# Patient Record
Sex: FEMALE | Race: WHITE | Employment: FULL TIME | URBAN - METROPOLITAN AREA
[De-identification: names, ages, dates, MRNs, and addresses within clinical notes are randomized per-mention and may not be internally consistent; named-entity substitution may affect disease eponyms.]

---

## 2014-05-15 DEVICE — 5.5MM CURVED ROD, TITANIUM ALLOY, 65MM LENGTH
Type: IMPLANTABLE DEVICE | Site: SPINE LUMBAR | Status: NON-FUNCTIONAL
Brand: CREO
Removed: 2021-02-24

## 2020-11-23 ENCOUNTER — TRANSCRIBE ORDER (OUTPATIENT)
Dept: SCHEDULING | Age: 65
End: 2020-11-23

## 2020-11-23 DIAGNOSIS — M51.26 HNP (HERNIATED NUCLEUS PULPOSUS), LUMBAR: Primary | ICD-10-CM

## 2020-11-23 DIAGNOSIS — M54.16 LUMBAR RADICULOPATHY: ICD-10-CM

## 2020-11-27 NOTE — PERIOP NOTES
Patient denied any COVID-19 symptoms or possible exposure that would require a pre-procedural COVID-19 test for the IR procedure scheduled on 12/3.

## 2020-12-03 ENCOUNTER — HOSPITAL ENCOUNTER (OUTPATIENT)
Dept: INTERVENTIONAL RADIOLOGY/VASCULAR | Age: 65
Discharge: HOME | End: 2020-12-03
Attending: PHYSICIAN ASSISTANT
Payer: COMMERCIAL

## 2020-12-03 VITALS
BODY MASS INDEX: 19.24 KG/M2 | SYSTOLIC BLOOD PRESSURE: 158 MMHG | HEIGHT: 60 IN | HEART RATE: 79 BPM | OXYGEN SATURATION: 100 % | DIASTOLIC BLOOD PRESSURE: 82 MMHG | RESPIRATION RATE: 20 BRPM | WEIGHT: 98 LBS

## 2020-12-03 DIAGNOSIS — M51.26 HNP (HERNIATED NUCLEUS PULPOSUS), LUMBAR: ICD-10-CM

## 2020-12-03 DIAGNOSIS — M54.16 LUMBAR RADICULOPATHY: ICD-10-CM

## 2020-12-03 PROCEDURE — 74011250636 HC RX REV CODE- 250/636: Performed by: STUDENT IN AN ORGANIZED HEALTH CARE EDUCATION/TRAINING PROGRAM

## 2020-12-03 PROCEDURE — 77030003666 HC NDL SPINAL BD -A

## 2020-12-03 PROCEDURE — 2709999900 HC NON-CHARGEABLE SUPPLY

## 2020-12-03 PROCEDURE — 74011000250 HC RX REV CODE- 250: Performed by: STUDENT IN AN ORGANIZED HEALTH CARE EDUCATION/TRAINING PROGRAM

## 2020-12-03 PROCEDURE — 74011000636 HC RX REV CODE- 636: Performed by: STUDENT IN AN ORGANIZED HEALTH CARE EDUCATION/TRAINING PROGRAM

## 2020-12-03 PROCEDURE — 64483 NJX AA&/STRD TFRM EPI L/S 1: CPT

## 2020-12-03 RX ORDER — LIDOCAINE HYDROCHLORIDE 20 MG/ML
20 INJECTION, SOLUTION INFILTRATION; PERINEURAL ONCE
Status: COMPLETED | OUTPATIENT
Start: 2020-12-03 | End: 2020-12-03

## 2020-12-03 RX ORDER — LIDOCAINE HYDROCHLORIDE 10 MG/ML
4 INJECTION, SOLUTION EPIDURAL; INFILTRATION; INTRACAUDAL; PERINEURAL ONCE
Status: COMPLETED | OUTPATIENT
Start: 2020-12-03 | End: 2020-12-03

## 2020-12-03 RX ORDER — DEXAMETHASONE SODIUM PHOSPHATE 10 MG/ML
14 INJECTION INTRAMUSCULAR; INTRAVENOUS ONCE
Status: COMPLETED | OUTPATIENT
Start: 2020-12-03 | End: 2020-12-03

## 2020-12-03 RX ADMIN — DEXAMETHASONE SODIUM PHOSPHATE 14 MG: 10 INJECTION, SOLUTION INTRAMUSCULAR; INTRAVENOUS at 14:25

## 2020-12-03 RX ADMIN — IOPAMIDOL 3 ML: 408 INJECTION, SOLUTION INTRATHECAL at 14:25

## 2020-12-03 RX ADMIN — LIDOCAINE HYDROCHLORIDE 200 MG: 20 INJECTION, SOLUTION INFILTRATION; PERINEURAL at 14:25

## 2020-12-03 RX ADMIN — LIDOCAINE HYDROCHLORIDE 4 ML: 10 INJECTION, SOLUTION EPIDURAL; INFILTRATION; INTRACAUDAL; PERINEURAL at 14:25

## 2020-12-03 NOTE — PROCEDURES
Interventional Radiology  Procedure Note        12/3/2020 2:57 PM    Patient: Flaca Izaguirre     Informed consent obtained    Diagnosis: Back pain with radiculopathy    Procedure(s): Bilateral L3-4 transforaminal VIVIANA    Specimens removed:  none    Complications: None    Primary Physician: Chaim Davison MD    Recomendations: N/A    Discharge Disposition: stable; discharge to home    Full dictated report to follow    Chaim Davison MD  Interventional Radiology  [facility]  2:57 PM, 12/3/2020

## 2020-12-03 NOTE — DISCHARGE INSTRUCTIONS
[address]  Special Procedures/Radiology Department      Steroidal Injection      Go home and rest.     No vigorous physical activity today. Be aware that numbness and/or tingling can occur up to 24 hours after the injection. No driving today. Resume your previous diet. Resume your previous medications. Depending on your job, you may return to work in 25 to 48 hours. It may take up to one week after the injection to see a change or an improvement in your symptoms. Be sure to follow up with your physician. Tell your physician if the injection helped with your symptoms or if the injection did nothing for your symptoms. For minor discomfort, you can take Tylenol, as directed on the label.       If you have any questions or concerns, please call [phone] and ask for the nurse on-call

## 2020-12-03 NOTE — H&P
Radiology History and Physical    Patient: Priyanka Grier 60 y.o. female       Chief Complaint: Back pain with left radiculopathy    History of Present Illness: 60year old woman with back pain, markedly worse in the past few weeks. S/p L4-S1 fusion in 2014.     History:    Past Medical History:   Diagnosis Date    Other ill-defined conditions(876.89)     MIGRAINES     Family History   Problem Relation Age of Onset    Cancer Mother         COLON [place]     Social History     Socioeconomic History    Marital status: SINGLE     Spouse name: Not on file    Number of children: Not on file    Years of education: Not on file    Highest education level: Not on file   Occupational History    Not on file   Social Needs    Financial resource strain: Not on file    Food insecurity     Worry: Not on file     Inability: Not on file    Transportation needs     Medical: Not on file     Non-medical: Not on file   Tobacco Use    Smoking status: Never Smoker    Smokeless tobacco: Never Used   Substance and Sexual Activity    Alcohol use: No    Drug use: No    Sexual activity: Not on file   Lifestyle    Physical activity     Days per week: Not on file     Minutes per session: Not on file    Stress: Not on file   Relationships    Social connections     Talks on phone: Not on file     Gets together: Not on file     Attends Latter-day service: Not on file     Active member of club or organization: Not on file     Attends meetings of clubs or organizations: Not on file     Relationship status: Not on file    Intimate partner violence     Fear of current or ex partner: Not on file     Emotionally abused: Not on file     Physically abused: Not on file     Forced sexual activity: Not on file   Other Topics Concern    Not on file   Social History Narrative    Not on file       Allergies: No Known Allergies    Current Medications:  Current Outpatient Medications   Medication Sig    oxyCODONE-acetaminophen (PERCOCET 10) 10325 mg per tablet Take 1 Tab by mouth every four (4) hours as needed for Pain.  topiramate (TOPAMAX) 200 mg tablet Take 400 mg by mouth daily.  SUMAtriptan (IMITREX) 50 mg tablet Take 50 mg by mouth as needed for Migraine. Current Facility-Administered Medications   Medication Dose Route Frequency    iopamidoL (ISOVUE-M 200) 41 % contrast solution 3 mL  3 mL IntraSPINal ONCE    lidocaine (XYLOCAINE) 20 mg/mL (2 %) injection 400 mg  20 mL SubCUTAneous ONCE    lidocaine (PF) (XYLOCAINE) 10 mg/mL (1 %) injection 4 mL  4 mL SubCUTAneous ONCE    dexamethasone (PF) (DECADRON) 10 mg/mL injection 14 mg  14 mg IntraVENous ONCE        Physical Exam:  There were no vitals taken for this visit. GENERAL: alert, cooperative, no distress, appears stated age,   LUNG: Nonlabored respiration on room air  HEART: regular rate and rhythm, R radial pulse 2/2  EXT: No edema BLEs  ABD: Nontender, nondistended    Alerts:      Laboratory:    No results for input(s): HGB, HCT, WBC, PLT, INR, BUN, CREA, K, CRCLT, HGBEXT, HCTEXT, PLTEXT, INREXT in the last 72 hours. No lab exists for component: PTT, PT      Plan of Care/Planned Procedure:  Risks, benefits, and alternatives reviewed with patient and she agrees to proceed with the procedure.      L3-4 bilateral transforaminal VIVIANA    David Olguin MD  Interventional Radiology  [facility]  2:02 PM, 12/3/2020

## 2020-12-03 NOTE — ROUTINE PROCESS
1315 - pt ambulated into department w/limp gait but steady - pt reports this started approx 3-4 weeks ago that involved 10/10 pain radiating from back down left leg including left foot - pt reports that she has had prior back surgeries and steroid injections before. 1500 - pt report \"just tired feeling\" moving all extremities w/o limitations and ambulating w/same gait as she walk in prior - pt report no pain due to numbing for procedure injection. Pt drinking orange juice because she stated she did not eat today and maybe ate a pancake earlier this morning. Pt declined crackers or food. 1505 - pt reports she felt better and requested discharge - reviewed discharge instructions w/pt which verbalized understanding and copy given to pt. Wheelchair used to take pt to entrance of [address] where friend was driving pt home.

## 2020-12-16 ENCOUNTER — TRANSCRIBE ORDER (OUTPATIENT)
Dept: SCHEDULING | Age: 65
End: 2020-12-16

## 2020-12-16 DIAGNOSIS — M51.26 HNP (HERNIATED NUCLEUS PULPOSUS), LUMBAR: ICD-10-CM

## 2020-12-16 DIAGNOSIS — M51.36 DDD (DEGENERATIVE DISC DISEASE), LUMBAR: ICD-10-CM

## 2020-12-16 DIAGNOSIS — Z98.1 S/P LUMBAR FUSION: ICD-10-CM

## 2020-12-16 DIAGNOSIS — M54.16 LUMBAR RADICULOPATHY: Primary | ICD-10-CM

## 2020-12-16 DIAGNOSIS — M48.062 LUMBAR STENOSIS WITH NEUROGENIC CLAUDICATION: ICD-10-CM

## 2021-02-08 ENCOUNTER — TRANSCRIBE ORDER (OUTPATIENT)
Dept: REGISTRATION | Age: 66
End: 2021-02-08

## 2021-02-08 DIAGNOSIS — Z01.812 PRE-PROCEDURE LAB EXAM: Primary | ICD-10-CM

## 2021-02-10 ENCOUNTER — HOSPITAL ENCOUNTER (OUTPATIENT)
Dept: PREADMISSION TESTING | Age: 66
Discharge: HOME | End: 2021-02-10
Payer: COMMERCIAL

## 2021-02-10 VITALS
HEIGHT: 60 IN | BODY MASS INDEX: 20.26 KG/M2 | RESPIRATION RATE: 16 BRPM | WEIGHT: 103.17 LBS | HEART RATE: 80 BPM | DIASTOLIC BLOOD PRESSURE: 83 MMHG | SYSTOLIC BLOOD PRESSURE: 124 MMHG | TEMPERATURE: 98 F

## 2021-02-10 LAB
ABO + RH BLD: NORMAL
APPEARANCE UR: CLEAR
ATRIAL RATE: 70 BPM
BACTERIA URNS QL MICRO: NEGATIVE /HPF
BILIRUB UR QL: NEGATIVE
BLOOD GROUP ANTIBODIES SERPL: NORMAL
CALCULATED P AXIS, ECG09: 69 DEGREES
CALCULATED R AXIS, ECG10: 76 DEGREES
CALCULATED T AXIS, ECG11: 45 DEGREES
COLOR UR: NORMAL
DIAGNOSIS, 93000: NORMAL
EPITH CASTS URNS QL MICRO: NORMAL /LPF
GLUCOSE UR STRIP.AUTO-MCNC: NEGATIVE MG/DL
HGB UR QL STRIP: NEGATIVE
HYALINE CASTS URNS QL MICRO: NORMAL /LPF (ref 0–5)
KETONES UR QL STRIP.AUTO: NEGATIVE MG/DL
LEUKOCYTE ESTERASE UR QL STRIP.AUTO: NEGATIVE
NITRITE UR QL STRIP.AUTO: NEGATIVE
P-R INTERVAL, ECG05: 138 MS
PH UR STRIP: 6 [PH] (ref 5–8)
PROT UR STRIP-MCNC: NEGATIVE MG/DL
Q-T INTERVAL, ECG07: 416 MS
QRS DURATION, ECG06: 90 MS
QTC CALCULATION (BEZET), ECG08: 449 MS
RBC #/AREA URNS HPF: NORMAL /HPF (ref 0–5)
SP GR UR REFRACTOMETRY: 1.01 (ref 1–1.03)
SPECIMEN EXP DATE BLD: NORMAL
UA: UC IF INDICATED,UAUC: NORMAL
UROBILINOGEN UR QL STRIP.AUTO: 0.2 EU/DL (ref 0.2–1)
VENTRICULAR RATE, ECG03: 70 BPM
WBC URNS QL MICRO: NORMAL /HPF (ref 0–4)

## 2021-02-10 PROCEDURE — 93005 ELECTROCARDIOGRAM TRACING: CPT

## 2021-02-10 PROCEDURE — 86901 BLOOD TYPING SEROLOGIC RH(D): CPT

## 2021-02-10 PROCEDURE — 36415 COLL VENOUS BLD VENIPUNCTURE: CPT

## 2021-02-10 PROCEDURE — 81001 URINALYSIS AUTO W/SCOPE: CPT

## 2021-02-10 RX ORDER — SUMATRIPTAN 100 MG/1
100 TABLET, FILM COATED ORAL
COMMUNITY
End: 2022-11-01

## 2021-02-10 RX ORDER — CYCLOBENZAPRINE HCL 10 MG
20 TABLET ORAL
COMMUNITY
End: 2021-12-20

## 2021-02-10 RX ORDER — FUROSEMIDE 20 MG/1
20 TABLET ORAL DAILY
COMMUNITY

## 2021-02-10 RX ORDER — TRAMADOL HYDROCHLORIDE 50 MG/1
50 TABLET ORAL
COMMUNITY
End: 2021-02-28

## 2021-02-10 NOTE — PERIOP NOTES
DO NOT EAT ANYTHING AFTER MIDNIGHT, except as instructed THE NIGHT BEFORE SURGERY. PT GIVEN OPPORTUNITY TO ASK ADDITIONAL QUESTIONS. PRE-OPERATIVE INSTRUCTIONS REVIEWED WITH PATIENT. PATIENT GIVEN 2-PACK OF CHG SOAP. INSTRUCTIONS (REVIEWED) ON USE OF CHG SOAP. PATIENT GIVEN SSI INFECTION FAQ SHEET. MRSA / MSSA TREATMENT INSTRUCTION SHEET GIVEN WITH AN EXPLANATION TO PATIENT THAT THEY WILL BE NOTIFIED IF TREATMENT INSTRUCTIONS NEED TO BE INITIATED. PATIENT WAS GIVEN THE OPPORTUNITY TO ASK QUESTIONS ON THE INFORMATION PROVIDED. [facility] APPOINTMENTS REVIEWED AND GIVEN TO PATIENT. COVID-19 INSTRUCTION SHEET ALSO REVIEWED AND GIVEN TO PATIENT.

## 2021-02-10 NOTE — PERIOP NOTES
PAT Nurse Practitioner   Pre-Operative Chart Review/Assessment:-ORTHOPEDIC/NEUROSURGICAL SPINE                Patient Name:  Robin Krueger                                                           Age:   60 y.o.    :  1955     Today's Date:  2021     Date of PAT:   2/10/2021      Date of Surgery:    2021      Procedure(s):  L3-L4 Laminectomy Revision, L3-S1 Fusion Revision     Surgeon:   Dr. Trina Goddard                       PLAN:       1)  PCP: Dr. Shayy Martinez        2)  Cardiac Clearance: Not requested. 3)  Diabetic Treatment Consult: Not indicated. A1c-5.3       4)  Sleep Apnea evaluation:  Not indicated. ROBERT Score 1.       5)  Treatment for MRSA/Staph Aureus: Neg       6)  Additional Concerns: Migraines              Vital Signs:         Vitals:    02/10/21 0927   BP: 124/83   Pulse: 80   Resp: 16   Temp: 98 °F (36.7 °C)   Weight: 46.8 kg (103 lb 2.8 oz)   Height: 5' (1.524 m)            ____________________________________________  PAST MEDICAL HISTORY  Past Medical History:   Diagnosis Date    Arthritis     Other ill-defined conditions(799.89)     MIGRAINES      ____________________________________________  PAST SURGICAL HISTORY  Past Surgical History:   Procedure Laterality Date    HX GI      COLONOSCOPY MULTIPLE.  HX GI      ENDOSCOPY    HX GYN       X 1    HX HYSTERECTOMY      HX ORTHOPAEDIC      Spinal fusion at L4-L5    HX OTHER SURGICAL      DIAGNOSTIC EXPLORATORY SURGERIES MULTIPLE.  HX WISDOM TEETH EXTRACTION       X 4    IR INJ FORAMIN EPID LUMB ANES/STER SNGL  12/3/2020    NEUROLOGICAL PROCEDURE UNLISTED      LUMBAR FUSION WITH RODS & SCREWS      ____________________________________________  HOME MEDICATIONS    Current Outpatient Medications   Medication Sig    cyclobenzaprine (FLEXERIL) 10 mg tablet Take 20 mg by mouth three (3) times daily as needed for Muscle Spasm(s).     traMADoL (ULTRAM) 50 mg tablet Take 50 mg by mouth every six (6) hours as needed for Pain.  furosemide (LASIX) 20 mg tablet Take 20 mg by mouth daily.  SUMAtriptan (IMITREX) 100 mg tablet Take 100 mg by mouth once as needed for Migraine.  erenumab-aooe (Aimovig Autoinjector, 2 Pack,) 70 mg/mL injection 140 mg by SubCUTAneous route every twenty-eight (28) days. LAST DOSE: 1/29/2021     No current facility-administered medications for this encounter.       ____________________________________________  ALLERGIES  No Known Allergies   ____________________________________________  SOCIAL HISTORY  Social History     Tobacco Use    Smoking status: Never Smoker    Smokeless tobacco: Never Used   Substance Use Topics    Alcohol use: No      ____________________________________________        Labs:     CBC, BMP, A1c on chart.     Hospital Outpatient Visit on 02/10/2021   Component Date Value Ref Range Status    Special Requests: 02/10/2021 NO SPECIAL REQUESTS    Final    Culture result: 02/10/2021 MRSA NOT PRESENT    Final            Crossmatch Expiration 02/10/2021 02/24/2021,2359   Final    ABO/Rh(D) 02/10/2021 O POSITIVE   Final    Antibody screen 02/10/2021 NEG   Final    Ventricular Rate 02/10/2021 70  BPM Final    Atrial Rate 02/10/2021 70  BPM Final    P-R Interval 02/10/2021 138  ms Final    QRS Duration 02/10/2021 90  ms Final    Q-T Interval 02/10/2021 416  ms Final    QTC Calculation (Bezet) 02/10/2021 449  ms Final    Calculated P Axis 02/10/2021 69  degrees Final    Calculated R Axis 02/10/2021 76  degrees Final    Calculated T Axis 02/10/2021 45  degrees Final    Diagnosis 02/10/2021    Final                    Value:Normal sinus rhythm  Nonspecific ST abnormality  Abnormal ECG  When compared with ECG of 10-ERNESTO-2015 17:42,  NV interval has increased  Confirmed by Roselynn Hua, MD, Nlisa Hilton ([id number]) on 2/10/2021 2:16:31 PM      Color 02/10/2021 YELLOW/STRAW    Final    Color Reference Range: Straw, Yellow or Dark Yellow    Appearance 02/10/2021 CLEAR  CLEAR Final    Specific gravity 02/10/2021 1.010  1.003 - 1.030   Final    pH (UA) 02/10/2021 6.0  5.0 - 8.0   Final    Protein 02/10/2021 Negative  NEG mg/dL Final    Glucose 02/10/2021 Negative  NEG mg/dL Final    Ketone 02/10/2021 Negative  NEG mg/dL Final    Bilirubin 02/10/2021 Negative  NEG   Final    Blood 02/10/2021 Negative  NEG   Final    Urobilinogen 02/10/2021 0.2  0.2 - 1.0 EU/dL Final    Nitrites 02/10/2021 Negative  NEG   Final    Leukocyte Esterase 02/10/2021 Negative  NEG   Final    UA:UC IF INDICATED 02/10/2021 CULTURE NOT INDICATED BY UA RESULT  CNI   Final    WBC 02/10/2021 0-4  0 - 4 /hpf Final    RBC 02/10/2021 0-5  0 - 5 /hpf Final    Epithelial cells 02/10/2021 FEW  FEW /lpf Final    Epithelial cell category consists of squamous cells and /or transitional urothelial cells. Renal tubular cells, if present, are separately identified as such.  Bacteria 02/10/2021 Negative  NEG /hpf Final    Hyaline cast 02/10/2021 0-2  0 - 5 /lpf Final        Skin:     Denies open wounds, cuts, sores, rashes or other areas of concern in PAT assessment.         Cheryl Vinson NP

## 2021-02-11 LAB
BACTERIA SPEC CULT: NORMAL
BACTERIA SPEC CULT: NORMAL
SERVICE CMNT-IMP: NORMAL

## 2021-02-11 NOTE — PERIOP NOTES
PAT EKG AND LAB RESULTS FAXED TO DR. PUCKETT'S OFFICE VIA CC.    PCP LAB RESULTS FAXED TO DR. PUCKETT'S OFFICE.    LINDA TARIQ NP HAS REVIEWED CHART.

## 2021-02-15 NOTE — H&P
Merrill Pimentel  Location: [place] [address] [address]  Patient #: [id number]  : 1955  Single / Language: [place] / Race: White  Female    History of Present Illness   The patient is a 60year old female who presents with a complaint of Low Back Pain. Note for \"Low Back Pain\": Mrs. Chung Sena comes in today for an evaluation for ongoing lower back pain and left leg symptoms.  Her history is significant for a prior L4-S1 lumbar fusion.  She has been struggling over the last 6 to 8 months with severe lower back pain with radiation to the left hip and knee.  Radiation occasionally radiates to the medial shin.  It limits her standing and walking.  She does get some relief with sitting.  She is tried the injections with only worsening of symptoms.  She denies any bowel bladder difficulties. Problem List/Past Medical   SPONDYLOLISTHESIS, ACQ. (738.4)    FOLLOW-UP AFTER SURGERY (V67.00)    SCIATICA (724.3  M54.30)    STENOSIS (724.02)    S/P lumbar fusion (V45.4  Z98.1)    Lumbar radiculopathy (724.4  M54.16)    Lumbar stenosis with neurogenic claudication (724.03  M48.062)    LOW BACK PAIN (724.2  M54.5)    REVIEW OF SYSTEMS: Systems were reviewed by the provider.    HNP (herniated nucleus pulposus), lumbar (722.10  M51.26)      Allergies   No Known Allergies   [10/16/2020]:  No Known Drug Allergies   [10/16/2020]:    Family History   Cancer    Colon Cancer    Depression    Breast cancer    Arthritis    Asthma    Osteoporosis    Migraine Headache    Diabetes Mellitus    Heart disease in female family member before age 72    Hypertension      Social History   Sun Exposure   : rarely  Illicit drug use   : none  Tobacco use   2014: Never smoker. Alcohol use   2014: Never drinks. Exercise   2014: exercise activities occasionally. Caffeine use   2014: Drinks tea and soft drinks 1-2 times a day.     Medication History  Diclofenac Sodium  (75MG Tablet DR, 1 (one) Oral two times daily, as needed, Taken starting 12/15/2020) Active. Econazole Nitrate  (1% Cream, External) Active. Topiramate  (200MG Tablet, Oral) Active. SUMAtriptan Succinate  (50MG Tablet, Oral) Active. Medications Reconciled     Past Surgical History   No pertinent past surgical history      Diagnostic Studies History   Lumbar Spine X-ray   Date: 1/15/2021, Results: Review of the lumbar x-rays today demonstrate that she has the previous L4-S1 fusion. She has had a progression of the degeneration at L3-4 with an degenerative scoliosis above her prior fusion. She has a lateral listhesis at L3-4 of approximately 6 mm now. She has asymmetrical collapse onto the left side due to the lateral listhesis and scoliosis. There are no fractures or lytic lesions. MRI, Spine   Date: 1/15/2021, Results: Review of the most recent MRI demonstrates she does have the previous L4-S1 fusion. She has lateral listhesis at L3-4 now and foraminal stenosis and from a retrolisthesis at L3-4 as well as a scoliosis. Other Problems   Osteoporosis    Depression    Migraine Headache    DDD (degenerative disc disease), lumbar (722.52  M51.36)    Treatment options were discussed with the patient in full.      Review of Systems   General Present- Fatigue. Not Present- Appetite Loss, Chills, Fever, Night Sweats, Weight Gain and Weight Loss. Cardiovascular Present- Leg Cramps With Exertion. Not Present- Bluish Discoloration Of Lips Or Nails, Chest Pain, Difficulty Breathing Lying Down, Difficulty Breathing On Exertion, Palpitations and Swelling of Extremities. Musculoskeletal Present- Back Pain. Not Present- Joint Pain, Joint Stiffness and Joint Swelling. Neurological Present- Headaches. Not Present- Fainting, Memory Loss, Numbness, Seizures, Tingling, Tremor, Unsteadiness and Weakness. Endocrine Present- Cold Intolerance. Not Present- Excessive Hunger, Excessive Thirst, Excessive Urination and Heat Intolerance.     Assessment & Plan REVIEW OF SYSTEMS: Systems were reviewed by the provider.(V49.9)    Current Plans  Pt Education - How to [address] using Patient Portal and 3rd Party Apps: discussed with patient and provided information. X-RAY EXAM OF LUMBAR SPINE, 2 OR 3 VIEWS (27842) (AP and Lateral views were taken today using Digital Radiography.)  Restarted Cyclobenzaprine HCl 5 MG Oral Tablet, 1 or 2 Tablet three times daily as needed for spasms, #30, 01/15/2021, No Refill. Started traMADol HCl 50 MG Oral Tablet, 1 (one) Tablet every 6 hours as needed for pain, #42, 01/15/2021, No Refill. S/P lumbar fusion (V45.4  Z98.1)    Lumbar stenosis with neurogenic claudication (724.03  M48.062)    Spondylolisthesis (Acquired) (738.4  M43.10)  Impression: She has developed degeneration above her prior fusion with now a degenerative scoliosis and a lateral listhesis of 6 mm and a retrolisthesis of approximately 4 mm. She has severe left-sided radicular symptoms from the foraminal and lateral recess stenosis. She has failed conservative treatment including injections. I am recommending a revision laminectomy with extension of her fusion to L3. The risks and benefits were discussed at length with the patient and the patient has elected to proceed. Indications for surgery include failed conservative treatment. Alternative treatments, risks and the perioperative course were discussed with the patient. All questions were answered. The risks and benefits of the procedure were explained. Benefits include definitive diagnosis, relief of pain, elimination of deformity and improved function. Risks of surgery including bleeding, infection, weakness, numbness, CSF leak, failure to improve symptoms, exacerbation of medical co-morbidities and even death were discussed with the patient.     Treatment options were discussed with the patient in full.(V65.49)    Current Plans  Presurgical planning was preformed with the patient today  Surgery to be scheduled  Procedure: Revision laminectomy L3-4 with revision fusion L3-S1. Signed by Dondra Schirmer, MD     Date of Surgery Update:  Ron Devi was seen and examined. History and physical has been reviewed. The patient has been examined.  There have been no significant clinical changes since the completion of the originally dated History and Physical.    Signed By: Dondra Schirmer, MD     February 24, 2021 7:19 AM

## 2021-02-20 ENCOUNTER — HOSPITAL ENCOUNTER (OUTPATIENT)
Dept: PREADMISSION TESTING | Age: 66
Discharge: HOME | End: 2021-02-20
Attending: ORTHOPAEDIC SURGERY
Payer: COMMERCIAL

## 2021-02-20 DIAGNOSIS — Z01.812 PRE-PROCEDURE LAB EXAM: ICD-10-CM

## 2021-02-20 PROCEDURE — U0003 INFECTIOUS AGENT DETECTION BY NUCLEIC ACID (DNA OR RNA); SEVERE ACUTE RESPIRATORY SYNDROME CORONAVIRUS 2 (SARS-COV-2) (CORONAVIRUS DISEASE [COVID-19]), AMPLIFIED PROBE TECHNIQUE, MAKING USE OF HIGH THROUGHPUT TECHNOLOGIES AS DESCRIBED BY CMS-2020-01-R: HCPCS

## 2021-02-22 LAB — SARS-COV-2, COV2NT: NOT DETECTED

## 2021-02-24 ENCOUNTER — ANESTHESIA EVENT (OUTPATIENT)
Dept: SURGERY | Age: 66
DRG: 455 | End: 2021-02-24
Payer: COMMERCIAL

## 2021-02-24 ENCOUNTER — ANESTHESIA (OUTPATIENT)
Dept: SURGERY | Age: 66
DRG: 455 | End: 2021-02-24
Payer: COMMERCIAL

## 2021-02-24 ENCOUNTER — HOSPITAL ENCOUNTER (INPATIENT)
Age: 66
LOS: 4 days | Discharge: HOME HEALTH | DRG: 455 | End: 2021-02-28
Attending: ORTHOPAEDIC SURGERY | Admitting: ORTHOPAEDIC SURGERY
Payer: COMMERCIAL

## 2021-02-24 ENCOUNTER — APPOINTMENT (OUTPATIENT)
Dept: GENERAL RADIOLOGY | Age: 66
DRG: 455 | End: 2021-02-24
Attending: ORTHOPAEDIC SURGERY
Payer: COMMERCIAL

## 2021-02-24 DIAGNOSIS — M48.062 SPINAL STENOSIS OF LUMBAR REGION WITH NEUROGENIC CLAUDICATION: Primary | ICD-10-CM

## 2021-02-24 PROBLEM — M48.061 SPINAL STENOSIS, LUMBAR: Status: ACTIVE | Noted: 2021-02-24

## 2021-02-24 LAB
GLUCOSE BLD STRIP.AUTO-MCNC: 80 MG/DL (ref 65–100)
SERVICE CMNT-IMP: NORMAL

## 2021-02-24 PROCEDURE — 82962 GLUCOSE BLOOD TEST: CPT

## 2021-02-24 PROCEDURE — 77030005513 HC CATH URETH FOL11 MDII -B: Performed by: ORTHOPAEDIC SURGERY

## 2021-02-24 PROCEDURE — 0QW004Z REVISION OF INTERNAL FIXATION DEVICE IN LUMBAR VERTEBRA, OPEN APPROACH: ICD-10-PCS | Performed by: ORTHOPAEDIC SURGERY

## 2021-02-24 PROCEDURE — 74011250636 HC RX REV CODE- 250/636: Performed by: PHYSICIAN ASSISTANT

## 2021-02-24 PROCEDURE — 76210000016 HC OR PH I REC 1 TO 1.5 HR: Performed by: ORTHOPAEDIC SURGERY

## 2021-02-24 PROCEDURE — 76060000036 HC ANESTHESIA 2.5 TO 3 HR: Performed by: ORTHOPAEDIC SURGERY

## 2021-02-24 PROCEDURE — 77030040361 HC SLV COMPR DVT MDII -B: Performed by: ORTHOPAEDIC SURGERY

## 2021-02-24 PROCEDURE — 0SG00K1 FUSION OF LUMBAR VERTEBRAL JOINT WITH NONAUTOLOGOUS TISSUE SUBSTITUTE, POSTERIOR APPROACH, POSTERIOR COLUMN, OPEN APPROACH: ICD-10-PCS | Performed by: ORTHOPAEDIC SURGERY

## 2021-02-24 PROCEDURE — 2709999900 HC NON-CHARGEABLE SUPPLY: Performed by: ORTHOPAEDIC SURGERY

## 2021-02-24 PROCEDURE — 74011000250 HC RX REV CODE- 250: Performed by: PHYSICIAN ASSISTANT

## 2021-02-24 PROCEDURE — 74011250637 HC RX REV CODE- 250/637: Performed by: ANESTHESIOLOGY

## 2021-02-24 PROCEDURE — 77030022302 HC GRFT BN SPN SC OSTEO -H1: Performed by: ORTHOPAEDIC SURGERY

## 2021-02-24 PROCEDURE — C1889 IMPLANT/INSERT DEVICE, NOC: HCPCS | Performed by: ORTHOPAEDIC SURGERY

## 2021-02-24 PROCEDURE — 77030037713 HC CLOSR DEV INCIS ZIP STRY -B: Performed by: ORTHOPAEDIC SURGERY

## 2021-02-24 PROCEDURE — 72100 X-RAY EXAM L-S SPINE 2/3 VWS: CPT

## 2021-02-24 PROCEDURE — 0SG00AJ FUSION OF LUMBAR VERTEBRAL JOINT WITH INTERBODY FUSION DEVICE, POSTERIOR APPROACH, ANTERIOR COLUMN, OPEN APPROACH: ICD-10-PCS | Performed by: ORTHOPAEDIC SURGERY

## 2021-02-24 PROCEDURE — 74011000250 HC RX REV CODE- 250: Performed by: NURSE ANESTHETIST, CERTIFIED REGISTERED

## 2021-02-24 PROCEDURE — 77030022270 HC GRFT BN MAGNIFUS OSTEO -C: Performed by: ORTHOPAEDIC SURGERY

## 2021-02-24 PROCEDURE — 77030038692 HC WND DEB SYS IRMX -B: Performed by: ORTHOPAEDIC SURGERY

## 2021-02-24 PROCEDURE — 74011250636 HC RX REV CODE- 250/636: Performed by: NURSE ANESTHETIST, CERTIFIED REGISTERED

## 2021-02-24 PROCEDURE — 77030012893

## 2021-02-24 PROCEDURE — 77030038552 HC DRN WND MDII -A: Performed by: ORTHOPAEDIC SURGERY

## 2021-02-24 PROCEDURE — 74011250636 HC RX REV CODE- 250/636: Performed by: ANESTHESIOLOGY

## 2021-02-24 PROCEDURE — 76010000172 HC OR TIME 2.5 TO 3 HR INTENSV-TIER 1: Performed by: ORTHOPAEDIC SURGERY

## 2021-02-24 PROCEDURE — C1713 ANCHOR/SCREW BN/BN,TIS/BN: HCPCS | Performed by: ORTHOPAEDIC SURGERY

## 2021-02-24 PROCEDURE — 77030014650 HC SEAL MTRX FLOSEL BAXT -C: Performed by: ORTHOPAEDIC SURGERY

## 2021-02-24 PROCEDURE — 77030014007 HC SPNG HEMSTAT J&J -B: Performed by: ORTHOPAEDIC SURGERY

## 2021-02-24 PROCEDURE — 74011250637 HC RX REV CODE- 250/637: Performed by: PHYSICIAN ASSISTANT

## 2021-02-24 PROCEDURE — 77030040922 HC BLNKT HYPOTHRM STRY -A

## 2021-02-24 PROCEDURE — 77030008684 HC TU ET CUF COVD -B: Performed by: ANESTHESIOLOGY

## 2021-02-24 PROCEDURE — 00NY0ZZ RELEASE LUMBAR SPINAL CORD, OPEN APPROACH: ICD-10-PCS | Performed by: ORTHOPAEDIC SURGERY

## 2021-02-24 PROCEDURE — 65270000029 HC RM PRIVATE

## 2021-02-24 PROCEDURE — 77030026438 HC STYL ET INTUB CARD -A: Performed by: ANESTHESIOLOGY

## 2021-02-24 PROCEDURE — 77030034475 HC MISC IMPL SPN: Performed by: ORTHOPAEDIC SURGERY

## 2021-02-24 PROCEDURE — 2709999900 HC NON-CHARGEABLE SUPPLY

## 2021-02-24 PROCEDURE — 77030038600 HC TU BPLR IRR DISP STRY -B: Performed by: ORTHOPAEDIC SURGERY

## 2021-02-24 PROCEDURE — 77030006807 HC BLD SAW RECIP KMET -B: Performed by: ORTHOPAEDIC SURGERY

## 2021-02-24 PROCEDURE — 77030004391 HC BUR FLUT MEDT -C: Performed by: ORTHOPAEDIC SURGERY

## 2021-02-24 PROCEDURE — 77030031139 HC SUT VCRL2 J&J -A: Performed by: ORTHOPAEDIC SURGERY

## 2021-02-24 PROCEDURE — 77030013079 HC BLNKT BAIR HGGR 3M -A: Performed by: ANESTHESIOLOGY

## 2021-02-24 PROCEDURE — 74011250636 HC RX REV CODE- 250/636: Performed by: ORTHOPAEDIC SURGERY

## 2021-02-24 PROCEDURE — 0SB20ZZ EXCISION OF LUMBAR VERTEBRAL DISC, OPEN APPROACH: ICD-10-PCS | Performed by: ORTHOPAEDIC SURGERY

## 2021-02-24 PROCEDURE — 74011000250 HC RX REV CODE- 250: Performed by: ORTHOPAEDIC SURGERY

## 2021-02-24 PROCEDURE — 77030029099 HC BN WAX SSPC -A: Performed by: ORTHOPAEDIC SURGERY

## 2021-02-24 PROCEDURE — 0QW104Z REVISION OF INTERNAL FIXATION DEVICE IN SACRUM, OPEN APPROACH: ICD-10-PCS | Performed by: ORTHOPAEDIC SURGERY

## 2021-02-24 DEVICE — 75MM ROD, PRE-LORDOSED
Type: IMPLANTABLE DEVICE | Site: BACK | Status: FUNCTIONAL
Brand: FIREBIRD

## 2021-02-24 DEVICE — DBM 7509211 MAGNIFUSE 1 X 10CM
Type: IMPLANTABLE DEVICE | Site: BACK | Status: FUNCTIONAL
Brand: MAGNIFUSE® BONE GRAFT

## 2021-02-24 DEVICE — 7.5MM X 40MM CORTICAL BONE SCREW
Type: IMPLANTABLE DEVICE | Site: BACK | Status: FUNCTIONAL
Brand: JANUS

## 2021-02-24 DEVICE — 6.5MM X 10MM WIDE X 24MM LORDOTIC ASSEMBLY
Type: IMPLANTABLE DEVICE | Site: BACK | Status: FUNCTIONAL
Brand: FORZA XP

## 2021-02-24 DEVICE — 6.5MM X 45MM CORTICAL BONE SCREW
Type: IMPLANTABLE DEVICE | Site: BACK | Status: FUNCTIONAL
Brand: JANUS

## 2021-02-24 DEVICE — SET SCREW
Type: IMPLANTABLE DEVICE | Site: BACK | Status: FUNCTIONAL
Brand: FIREBIRD NXG

## 2021-02-24 DEVICE — TOP LOADING BODY
Type: IMPLANTABLE DEVICE | Site: BACK | Status: FUNCTIONAL
Brand: FIREBIRD NXG

## 2021-02-24 DEVICE — DBM 7509007 MAGNIFUSE 7.5 X 25MM
Type: IMPLANTABLE DEVICE | Site: BACK | Status: FUNCTIONAL
Brand: MAGNIFUSE® BONE GRAFT

## 2021-02-24 RX ORDER — HYDROMORPHONE HYDROCHLORIDE 2 MG/ML
INJECTION, SOLUTION INTRAMUSCULAR; INTRAVENOUS; SUBCUTANEOUS AS NEEDED
Status: DISCONTINUED | OUTPATIENT
Start: 2021-02-24 | End: 2021-02-24 | Stop reason: HOSPADM

## 2021-02-24 RX ORDER — SODIUM CHLORIDE 9 MG/ML
500 INJECTION, SOLUTION INTRAVENOUS ONCE
Status: COMPLETED | OUTPATIENT
Start: 2021-02-24 | End: 2021-02-24

## 2021-02-24 RX ORDER — MIDAZOLAM HYDROCHLORIDE 1 MG/ML
INJECTION, SOLUTION INTRAMUSCULAR; INTRAVENOUS AS NEEDED
Status: DISCONTINUED | OUTPATIENT
Start: 2021-02-24 | End: 2021-02-24 | Stop reason: HOSPADM

## 2021-02-24 RX ORDER — MORPHINE SULFATE IN 0.9 % NACL 150MG/30ML
PATIENT CONTROLLED ANALGESIA SYRINGE INTRAVENOUS
Status: DISCONTINUED | OUTPATIENT
Start: 2021-02-24 | End: 2021-02-25

## 2021-02-24 RX ORDER — PROPOFOL 10 MG/ML
VIAL (ML) INTRAVENOUS
Status: DISCONTINUED | OUTPATIENT
Start: 2021-02-24 | End: 2021-02-24 | Stop reason: HOSPADM

## 2021-02-24 RX ORDER — VANCOMYCIN HYDROCHLORIDE 1 G/20ML
INJECTION, POWDER, LYOPHILIZED, FOR SOLUTION INTRAVENOUS AS NEEDED
Status: DISCONTINUED | OUTPATIENT
Start: 2021-02-24 | End: 2021-02-24 | Stop reason: HOSPADM

## 2021-02-24 RX ORDER — DEXMEDETOMIDINE HYDROCHLORIDE 100 UG/ML
INJECTION, SOLUTION INTRAVENOUS AS NEEDED
Status: DISCONTINUED | OUTPATIENT
Start: 2021-02-24 | End: 2021-02-24 | Stop reason: HOSPADM

## 2021-02-24 RX ORDER — LIDOCAINE HYDROCHLORIDE 10 MG/ML
0.1 INJECTION, SOLUTION EPIDURAL; INFILTRATION; INTRACAUDAL; PERINEURAL AS NEEDED
Status: DISCONTINUED | OUTPATIENT
Start: 2021-02-24 | End: 2021-02-24 | Stop reason: HOSPADM

## 2021-02-24 RX ORDER — ROCURONIUM BROMIDE 10 MG/ML
INJECTION, SOLUTION INTRAVENOUS AS NEEDED
Status: DISCONTINUED | OUTPATIENT
Start: 2021-02-24 | End: 2021-02-24 | Stop reason: HOSPADM

## 2021-02-24 RX ORDER — MIDAZOLAM HYDROCHLORIDE 1 MG/ML
1 INJECTION, SOLUTION INTRAMUSCULAR; INTRAVENOUS AS NEEDED
Status: DISCONTINUED | OUTPATIENT
Start: 2021-02-24 | End: 2021-02-24 | Stop reason: HOSPADM

## 2021-02-24 RX ORDER — FUROSEMIDE 20 MG/1
20 TABLET ORAL DAILY
Status: DISCONTINUED | OUTPATIENT
Start: 2021-02-25 | End: 2021-02-28 | Stop reason: HOSPADM

## 2021-02-24 RX ORDER — CEFAZOLIN SODIUM 1 G/3ML
INJECTION, POWDER, FOR SOLUTION INTRAMUSCULAR; INTRAVENOUS AS NEEDED
Status: DISCONTINUED | OUTPATIENT
Start: 2021-02-24 | End: 2021-02-24 | Stop reason: HOSPADM

## 2021-02-24 RX ORDER — OXYCODONE HYDROCHLORIDE 5 MG/1
5 TABLET ORAL
Status: DISCONTINUED | OUTPATIENT
Start: 2021-02-24 | End: 2021-02-28 | Stop reason: HOSPADM

## 2021-02-24 RX ORDER — SODIUM CHLORIDE 0.9 % (FLUSH) 0.9 %
5-40 SYRINGE (ML) INJECTION AS NEEDED
Status: DISCONTINUED | OUTPATIENT
Start: 2021-02-24 | End: 2021-02-24 | Stop reason: HOSPADM

## 2021-02-24 RX ORDER — SUMATRIPTAN 25 MG/1
100 TABLET, FILM COATED ORAL
Status: COMPLETED | OUTPATIENT
Start: 2021-02-24 | End: 2021-02-25

## 2021-02-24 RX ORDER — SODIUM CHLORIDE 0.9 % (FLUSH) 0.9 %
5-40 SYRINGE (ML) INJECTION EVERY 8 HOURS
Status: DISCONTINUED | OUTPATIENT
Start: 2021-02-24 | End: 2021-02-24 | Stop reason: HOSPADM

## 2021-02-24 RX ORDER — KETOROLAC TROMETHAMINE 30 MG/ML
15 INJECTION, SOLUTION INTRAMUSCULAR; INTRAVENOUS EVERY 6 HOURS
Status: COMPLETED | OUTPATIENT
Start: 2021-02-24 | End: 2021-02-25

## 2021-02-24 RX ORDER — LIDOCAINE HYDROCHLORIDE 20 MG/ML
INJECTION, SOLUTION EPIDURAL; INFILTRATION; INTRACAUDAL; PERINEURAL AS NEEDED
Status: DISCONTINUED | OUTPATIENT
Start: 2021-02-24 | End: 2021-02-24 | Stop reason: HOSPADM

## 2021-02-24 RX ORDER — EPHEDRINE SULFATE/0.9% NACL/PF 50 MG/5 ML
5 SYRINGE (ML) INTRAVENOUS AS NEEDED
Status: DISCONTINUED | OUTPATIENT
Start: 2021-02-24 | End: 2021-02-24 | Stop reason: HOSPADM

## 2021-02-24 RX ORDER — ONDANSETRON 2 MG/ML
4 INJECTION INTRAMUSCULAR; INTRAVENOUS
Status: ACTIVE | OUTPATIENT
Start: 2021-02-24 | End: 2021-02-25

## 2021-02-24 RX ORDER — OXYCODONE HYDROCHLORIDE 5 MG/1
10 TABLET ORAL
Status: DISCONTINUED | OUTPATIENT
Start: 2021-02-24 | End: 2021-02-28 | Stop reason: HOSPADM

## 2021-02-24 RX ORDER — FENTANYL CITRATE 50 UG/ML
25 INJECTION, SOLUTION INTRAMUSCULAR; INTRAVENOUS
Status: COMPLETED | OUTPATIENT
Start: 2021-02-24 | End: 2021-02-24

## 2021-02-24 RX ORDER — SODIUM CHLORIDE, SODIUM LACTATE, POTASSIUM CHLORIDE, CALCIUM CHLORIDE 600; 310; 30; 20 MG/100ML; MG/100ML; MG/100ML; MG/100ML
125 INJECTION, SOLUTION INTRAVENOUS CONTINUOUS
Status: DISCONTINUED | OUTPATIENT
Start: 2021-02-24 | End: 2021-02-24 | Stop reason: HOSPADM

## 2021-02-24 RX ORDER — SODIUM CHLORIDE 9 MG/ML
1000 INJECTION, SOLUTION INTRAVENOUS CONTINUOUS
Status: DISCONTINUED | OUTPATIENT
Start: 2021-02-24 | End: 2021-02-24 | Stop reason: HOSPADM

## 2021-02-24 RX ORDER — FACIAL-BODY WIPES
10 EACH TOPICAL DAILY PRN
Status: DISCONTINUED | OUTPATIENT
Start: 2021-02-26 | End: 2021-02-28 | Stop reason: HOSPADM

## 2021-02-24 RX ORDER — SUCCINYLCHOLINE CHLORIDE 20 MG/ML
INJECTION INTRAMUSCULAR; INTRAVENOUS AS NEEDED
Status: DISCONTINUED | OUTPATIENT
Start: 2021-02-24 | End: 2021-02-24 | Stop reason: HOSPADM

## 2021-02-24 RX ORDER — PROPOFOL 10 MG/ML
INJECTION, EMULSION INTRAVENOUS AS NEEDED
Status: DISCONTINUED | OUTPATIENT
Start: 2021-02-24 | End: 2021-02-24 | Stop reason: HOSPADM

## 2021-02-24 RX ORDER — SODIUM CHLORIDE 0.9 % (FLUSH) 0.9 %
5-40 SYRINGE (ML) INJECTION EVERY 8 HOURS
Status: DISCONTINUED | OUTPATIENT
Start: 2021-02-24 | End: 2021-02-28 | Stop reason: HOSPADM

## 2021-02-24 RX ORDER — ACETAMINOPHEN 325 MG/1
650 TABLET ORAL ONCE
Status: COMPLETED | OUTPATIENT
Start: 2021-02-24 | End: 2021-02-24

## 2021-02-24 RX ORDER — ONDANSETRON 2 MG/ML
4 INJECTION INTRAMUSCULAR; INTRAVENOUS AS NEEDED
Status: DISCONTINUED | OUTPATIENT
Start: 2021-02-24 | End: 2021-02-24 | Stop reason: HOSPADM

## 2021-02-24 RX ORDER — AMOXICILLIN 250 MG
1 CAPSULE ORAL 2 TIMES DAILY
Status: DISCONTINUED | OUTPATIENT
Start: 2021-02-24 | End: 2021-02-28 | Stop reason: HOSPADM

## 2021-02-24 RX ORDER — CYCLOBENZAPRINE HCL 10 MG
10 TABLET ORAL
Status: DISCONTINUED | OUTPATIENT
Start: 2021-02-24 | End: 2021-02-28 | Stop reason: HOSPADM

## 2021-02-24 RX ORDER — SODIUM CHLORIDE 0.9 % (FLUSH) 0.9 %
5-40 SYRINGE (ML) INJECTION AS NEEDED
Status: DISCONTINUED | OUTPATIENT
Start: 2021-02-24 | End: 2021-02-28 | Stop reason: HOSPADM

## 2021-02-24 RX ORDER — CYCLOBENZAPRINE HCL 10 MG
20 TABLET ORAL
Status: DISCONTINUED | OUTPATIENT
Start: 2021-02-24 | End: 2021-02-24 | Stop reason: SDUPTHER

## 2021-02-24 RX ORDER — FENTANYL CITRATE 50 UG/ML
INJECTION, SOLUTION INTRAMUSCULAR; INTRAVENOUS AS NEEDED
Status: DISCONTINUED | OUTPATIENT
Start: 2021-02-24 | End: 2021-02-24 | Stop reason: HOSPADM

## 2021-02-24 RX ORDER — ACETAMINOPHEN 500 MG
1000 TABLET ORAL EVERY 6 HOURS
Status: DISCONTINUED | OUTPATIENT
Start: 2021-02-24 | End: 2021-02-28 | Stop reason: HOSPADM

## 2021-02-24 RX ORDER — DIPHENHYDRAMINE HYDROCHLORIDE 50 MG/ML
12.5 INJECTION, SOLUTION INTRAMUSCULAR; INTRAVENOUS AS NEEDED
Status: DISCONTINUED | OUTPATIENT
Start: 2021-02-24 | End: 2021-02-24 | Stop reason: HOSPADM

## 2021-02-24 RX ORDER — OXYCODONE AND ACETAMINOPHEN 5; 325 MG/1; MG/1
1 TABLET ORAL AS NEEDED
Status: DISCONTINUED | OUTPATIENT
Start: 2021-02-24 | End: 2021-02-24 | Stop reason: HOSPADM

## 2021-02-24 RX ORDER — MORPHINE SULFATE 2 MG/ML
2 INJECTION, SOLUTION INTRAMUSCULAR; INTRAVENOUS
Status: ACTIVE | OUTPATIENT
Start: 2021-02-24 | End: 2021-02-25

## 2021-02-24 RX ORDER — MIDAZOLAM HYDROCHLORIDE 1 MG/ML
0.5 INJECTION, SOLUTION INTRAMUSCULAR; INTRAVENOUS
Status: DISCONTINUED | OUTPATIENT
Start: 2021-02-24 | End: 2021-02-24 | Stop reason: HOSPADM

## 2021-02-24 RX ORDER — SODIUM CHLORIDE 9 MG/ML
50 INJECTION, SOLUTION INTRAVENOUS CONTINUOUS
Status: DISCONTINUED | OUTPATIENT
Start: 2021-02-24 | End: 2021-02-24 | Stop reason: HOSPADM

## 2021-02-24 RX ORDER — DIPHENHYDRAMINE HYDROCHLORIDE 50 MG/ML
12.5 INJECTION, SOLUTION INTRAMUSCULAR; INTRAVENOUS
Status: ACTIVE | OUTPATIENT
Start: 2021-02-24 | End: 2021-02-25

## 2021-02-24 RX ORDER — NALOXONE HYDROCHLORIDE 0.4 MG/ML
0.4 INJECTION, SOLUTION INTRAMUSCULAR; INTRAVENOUS; SUBCUTANEOUS AS NEEDED
Status: DISCONTINUED | OUTPATIENT
Start: 2021-02-24 | End: 2021-02-28 | Stop reason: HOSPADM

## 2021-02-24 RX ORDER — FENTANYL CITRATE 50 UG/ML
50 INJECTION, SOLUTION INTRAMUSCULAR; INTRAVENOUS AS NEEDED
Status: DISCONTINUED | OUTPATIENT
Start: 2021-02-24 | End: 2021-02-24 | Stop reason: HOSPADM

## 2021-02-24 RX ORDER — ONDANSETRON 2 MG/ML
INJECTION INTRAMUSCULAR; INTRAVENOUS AS NEEDED
Status: DISCONTINUED | OUTPATIENT
Start: 2021-02-24 | End: 2021-02-24 | Stop reason: HOSPADM

## 2021-02-24 RX ORDER — HYDROMORPHONE HYDROCHLORIDE 1 MG/ML
0.2 INJECTION, SOLUTION INTRAMUSCULAR; INTRAVENOUS; SUBCUTANEOUS
Status: DISCONTINUED | OUTPATIENT
Start: 2021-02-24 | End: 2021-02-24 | Stop reason: HOSPADM

## 2021-02-24 RX ORDER — SODIUM CHLORIDE 9 MG/ML
125 INJECTION, SOLUTION INTRAVENOUS CONTINUOUS
Status: DISPENSED | OUTPATIENT
Start: 2021-02-24 | End: 2021-02-25

## 2021-02-24 RX ORDER — MORPHINE SULFATE 10 MG/ML
2 INJECTION, SOLUTION INTRAMUSCULAR; INTRAVENOUS
Status: DISCONTINUED | OUTPATIENT
Start: 2021-02-24 | End: 2021-02-24 | Stop reason: HOSPADM

## 2021-02-24 RX ORDER — POLYETHYLENE GLYCOL 3350 17 G/17G
17 POWDER, FOR SOLUTION ORAL DAILY
Status: DISCONTINUED | OUTPATIENT
Start: 2021-02-25 | End: 2021-02-28 | Stop reason: HOSPADM

## 2021-02-24 RX ORDER — PHENYLEPHRINE HCL IN 0.9% NACL 0.4MG/10ML
SYRINGE (ML) INTRAVENOUS AS NEEDED
Status: DISCONTINUED | OUTPATIENT
Start: 2021-02-24 | End: 2021-02-24 | Stop reason: HOSPADM

## 2021-02-24 RX ORDER — DEXAMETHASONE SODIUM PHOSPHATE 4 MG/ML
INJECTION, SOLUTION INTRA-ARTICULAR; INTRALESIONAL; INTRAMUSCULAR; INTRAVENOUS; SOFT TISSUE AS NEEDED
Status: DISCONTINUED | OUTPATIENT
Start: 2021-02-24 | End: 2021-02-24 | Stop reason: HOSPADM

## 2021-02-24 RX ADMIN — Medication 10 ML: at 19:42

## 2021-02-24 RX ADMIN — PROPOFOL 100 MG: 10 INJECTION, EMULSION INTRAVENOUS at 10:32

## 2021-02-24 RX ADMIN — SODIUM CHLORIDE 125 ML/HR: 9 INJECTION, SOLUTION INTRAVENOUS at 21:06

## 2021-02-24 RX ADMIN — HYDROMORPHONE HYDROCHLORIDE 1 MG: 2 INJECTION, SOLUTION INTRAMUSCULAR; INTRAVENOUS; SUBCUTANEOUS at 10:39

## 2021-02-24 RX ADMIN — DEXAMETHASONE SODIUM PHOSPHATE 4 MG: 4 INJECTION, SOLUTION INTRAMUSCULAR; INTRAVENOUS at 10:38

## 2021-02-24 RX ADMIN — FENTANYL CITRATE 25 MCG: 50 INJECTION, SOLUTION INTRAMUSCULAR; INTRAVENOUS at 13:55

## 2021-02-24 RX ADMIN — CEFAZOLIN 1170 MG: 330 INJECTION, POWDER, FOR SOLUTION INTRAMUSCULAR; INTRAVENOUS at 10:59

## 2021-02-24 RX ADMIN — SUCCINYLCHOLINE CHLORIDE 60 MG: 20 INJECTION, SOLUTION INTRAMUSCULAR; INTRAVENOUS at 10:32

## 2021-02-24 RX ADMIN — Medication: at 13:41

## 2021-02-24 RX ADMIN — FENTANYL CITRATE 25 MCG: 50 INJECTION, SOLUTION INTRAMUSCULAR; INTRAVENOUS at 14:30

## 2021-02-24 RX ADMIN — SODIUM CHLORIDE 125 ML/HR: 9 INJECTION, SOLUTION INTRAVENOUS at 14:00

## 2021-02-24 RX ADMIN — PROPOFOL 100 MCG/KG/MIN: 10 INJECTION, EMULSION INTRAVENOUS at 10:37

## 2021-02-24 RX ADMIN — MORPHINE SULFATE 2 MG: 10 INJECTION INTRAVENOUS at 14:35

## 2021-02-24 RX ADMIN — SODIUM CHLORIDE, POTASSIUM CHLORIDE, SODIUM LACTATE AND CALCIUM CHLORIDE: 600; 310; 30; 20 INJECTION, SOLUTION INTRAVENOUS at 10:15

## 2021-02-24 RX ADMIN — DEXMEDETOMIDINE HYDROCHLORIDE 10 MCG: 100 INJECTION, SOLUTION, CONCENTRATE INTRAVENOUS at 13:15

## 2021-02-24 RX ADMIN — MIDAZOLAM 2 MG: 1 INJECTION INTRAMUSCULAR; INTRAVENOUS at 10:25

## 2021-02-24 RX ADMIN — FENTANYL CITRATE 50 MCG: 50 INJECTION, SOLUTION INTRAMUSCULAR; INTRAVENOUS at 10:32

## 2021-02-24 RX ADMIN — MORPHINE SULFATE 2 MG: 10 INJECTION INTRAVENOUS at 15:30

## 2021-02-24 RX ADMIN — Medication 80 MCG: at 12:38

## 2021-02-24 RX ADMIN — MORPHINE SULFATE 2 MG: 10 INJECTION INTRAVENOUS at 14:30

## 2021-02-24 RX ADMIN — FENTANYL CITRATE 25 MCG: 50 INJECTION, SOLUTION INTRAMUSCULAR; INTRAVENOUS at 14:35

## 2021-02-24 RX ADMIN — FENTANYL CITRATE 25 MCG: 50 INJECTION, SOLUTION INTRAMUSCULAR; INTRAVENOUS at 13:20

## 2021-02-24 RX ADMIN — ACETAMINOPHEN 650 MG: 325 TABLET ORAL at 09:54

## 2021-02-24 RX ADMIN — Medication 80 MCG: at 12:08

## 2021-02-24 RX ADMIN — MORPHINE SULFATE 2 MG: 10 INJECTION INTRAVENOUS at 14:00

## 2021-02-24 RX ADMIN — MORPHINE SULFATE 2 MG: 10 INJECTION INTRAVENOUS at 13:55

## 2021-02-24 RX ADMIN — HYDROMORPHONE HYDROCHLORIDE 0.2 MG: 1 INJECTION, SOLUTION INTRAMUSCULAR; INTRAVENOUS; SUBCUTANEOUS at 15:45

## 2021-02-24 RX ADMIN — ONDANSETRON HYDROCHLORIDE 4 MG: 2 INJECTION, SOLUTION INTRAMUSCULAR; INTRAVENOUS at 10:38

## 2021-02-24 RX ADMIN — FENTANYL CITRATE 25 MCG: 50 INJECTION, SOLUTION INTRAMUSCULAR; INTRAVENOUS at 14:00

## 2021-02-24 RX ADMIN — KETOROLAC TROMETHAMINE 15 MG: 30 INJECTION, SOLUTION INTRAMUSCULAR at 19:26

## 2021-02-24 RX ADMIN — ROCURONIUM BROMIDE 10 MG: 10 SOLUTION INTRAVENOUS at 10:32

## 2021-02-24 RX ADMIN — LIDOCAINE HYDROCHLORIDE 40 MG: 20 INJECTION, SOLUTION EPIDURAL; INFILTRATION; INTRACAUDAL; PERINEURAL at 10:32

## 2021-02-24 RX ADMIN — Medication 80 MCG: at 11:58

## 2021-02-24 RX ADMIN — CEFAZOLIN SODIUM 2 G: 1 INJECTION, POWDER, FOR SOLUTION INTRAMUSCULAR; INTRAVENOUS at 19:32

## 2021-02-24 RX ADMIN — Medication 80 MCG: at 12:20

## 2021-02-24 RX ADMIN — Medication 80 MCG: at 12:52

## 2021-02-24 RX ADMIN — FENTANYL CITRATE 25 MCG: 50 INJECTION, SOLUTION INTRAMUSCULAR; INTRAVENOUS at 13:16

## 2021-02-24 RX ADMIN — DOCUSATE SODIUM 50 MG AND SENNOSIDES 8.6 MG 1 TABLET: 8.6; 5 TABLET, FILM COATED ORAL at 19:31

## 2021-02-24 RX ADMIN — ACETAMINOPHEN 1000 MG: 500 TABLET ORAL at 19:31

## 2021-02-24 RX ADMIN — SODIUM CHLORIDE 500 ML/HR: 9 INJECTION, SOLUTION INTRAVENOUS at 22:03

## 2021-02-24 NOTE — ANESTHESIA POSTPROCEDURE EVALUATION
Post-Anesthesia Evaluation and Assessment    Patient: Juanito King MRN: [id number]  SSN: [id number]    YOB: 1955  Age: 60 y.o. Sex: female      I have evaluated the patient and they are stable and ready for discharge from the PACU. Cardiovascular Function/Vital Signs  Visit Vitals  BP 98/64   Pulse 80   Temp (!) 35.8 °C (96.5 °F)   Resp 10   Ht 5' (1.524 m)   Wt 46.8 kg (103 lb 2.8 oz)   SpO2 100%   BMI 20.15 kg/m²       Patient is status post General anesthesia for Procedure(s):  REVISION LAMINECTOMY L3-4 WITH REVISION FUSION L3-S1. Nausea/Vomiting: None    Postoperative hydration reviewed and adequate. Pain:  Pain Scale 1: FLACC (02/24/21 1321)  Pain Intensity 1: 0 (02/24/21 1321)   Managed    Neurological Status: At baseline    Mental Status, Level of Consciousness: Alert and  oriented to person, place, and time    Pulmonary Status:   O2 Device: Nasal cannula (02/24/21 1321)   Adequate oxygenation and airway patent    Complications related to anesthesia: None    Post-anesthesia assessment completed. No concerns    Signed By: Kendrick Polk MD     February 24, 2021              Procedure(s):  REVISION LAMINECTOMY L3-4 WITH REVISION FUSION L3-S1.    general    <BSHSIANPOST>    INITIAL Post-op Vital signs:   Vitals Value Taken Time   BP 98/64 02/24/21 1335   Temp 35.8 °C (96.5 °F) 02/24/21 1321   Pulse 87 02/24/21 1338   Resp 13 02/24/21 1338   SpO2 100 % 02/24/21 1338   Vitals shown include unvalidated device data.

## 2021-02-24 NOTE — ANESTHESIA PREPROCEDURE EVALUATION
Anesthetic History   No history of anesthetic complications            Review of Systems / Medical History  Patient summary reviewed, nursing notes reviewed and pertinent labs reviewed    Pulmonary  Within defined limits        Undiagnosed apnea         Neuro/Psych   Within defined limits           Cardiovascular  Within defined limits                     GI/Hepatic/Renal  Within defined limits              Endo/Other        Arthritis     Other Findings              Physical Exam    Airway  Mallampati: I  TM Distance: > 6 cm  Neck ROM: normal range of motion   Mouth opening: Normal     Cardiovascular  Regular rate and rhythm,  S1 and S2 normal,  no murmur, click, rub, or gallop             Dental  No notable dental hx       Pulmonary  Breath sounds clear to auscultation               Abdominal  GI exam deferred       Other Findings            Anesthetic Plan    ASA: 2  Anesthesia type: general          Induction: Intravenous  Anesthetic plan and risks discussed with: Patient

## 2021-02-24 NOTE — PERIOP NOTES
Given to sterile back table:    Floseal 10 ml, ref [id number], Lot [id number], exp 12/28/2022    Surgifoam, ref [id number], [address], exp 9/14/2024

## 2021-02-24 NOTE — PROGRESS NOTES
TRANSFER - IN REPORT:    Verbal report received from Jyoti Juarez RN(name) on Jeaneen Rinne  being received from [facility]) for routine post - op      Report consisted of patients Situation, Background, Assessment and   Recommendations(SBAR). Information from the following report(s) SBAR, OR Summary and MAR was reviewed with the receiving nurse Jacoby Bell RN. Opportunity for questions and clarification was provided. Assessment completed upon patients arrival to unit and care assumed.

## 2021-02-24 NOTE — ROUTINE PROCESS
Patient: Ria Varma MRN: [id number]  SSN: [id number]   YOB: 1955  Age: 60 y.o. Sex: female     Patient is status post Procedure(s):  REVISION LAMINECTOMY L3-4 WITH REVISION FUSION L3-S1. Surgeon(s) and Role: Artis Crum MD - Primary    Local/Dose/Irrigation:  80 ml local given, see MAR                  Peripheral IV 02/24/21 Distal;Posterior;Right Forearm (Active)          Hemovac Left;Posterior; Lower Back (Active)   Site Assessment Clean, dry, & intact 02/24/21 1238   Dressing Status Clean, dry, & intact 02/24/21 1238   Drainage Description Serosanguinous 02/24/21 1238   Status Patent; Charged;Draining 02/24/21 1238      Airway - Endotracheal Tube 02/24/21 Oral (Active)                   Dressing/Packing:  Incision 02/24/21 Back Medial;Lower-Dressing/Treatment: ABD pad;Skin glue;Tape/Soft cloth adhesive tape (02/24/21 1254)    Splint/Cast:  ]    Other:  Mastisol, zip closure, abd pad and hypofix tape

## 2021-02-25 LAB
ANION GAP SERPL CALC-SCNC: 5 MMOL/L (ref 5–15)
BUN SERPL-MCNC: 19 MG/DL (ref 6–20)
BUN/CREAT SERPL: 23 (ref 12–20)
CALCIUM SERPL-MCNC: 8 MG/DL (ref 8.5–10.1)
CHLORIDE SERPL-SCNC: 112 MMOL/L (ref 97–108)
CO2 SERPL-SCNC: 23 MMOL/L (ref 21–32)
CREAT SERPL-MCNC: 0.84 MG/DL (ref 0.55–1.02)
GLUCOSE SERPL-MCNC: 83 MG/DL (ref 65–100)
HGB BLD-MCNC: 7.6 G/DL (ref 11.5–16)
POTASSIUM SERPL-SCNC: 4.1 MMOL/L (ref 3.5–5.1)
SODIUM SERPL-SCNC: 140 MMOL/L (ref 136–145)

## 2021-02-25 PROCEDURE — 74011000250 HC RX REV CODE- 250: Performed by: PHYSICIAN ASSISTANT

## 2021-02-25 PROCEDURE — 36415 COLL VENOUS BLD VENIPUNCTURE: CPT

## 2021-02-25 PROCEDURE — 97535 SELF CARE MNGMENT TRAINING: CPT

## 2021-02-25 PROCEDURE — 65270000029 HC RM PRIVATE

## 2021-02-25 PROCEDURE — 80048 BASIC METABOLIC PNL TOTAL CA: CPT

## 2021-02-25 PROCEDURE — 97165 OT EVAL LOW COMPLEX 30 MIN: CPT

## 2021-02-25 PROCEDURE — 74011250637 HC RX REV CODE- 250/637: Performed by: PHYSICIAN ASSISTANT

## 2021-02-25 PROCEDURE — 85018 HEMOGLOBIN: CPT

## 2021-02-25 PROCEDURE — 74011250636 HC RX REV CODE- 250/636: Performed by: PHYSICIAN ASSISTANT

## 2021-02-25 RX ADMIN — DOCUSATE SODIUM 50 MG AND SENNOSIDES 8.6 MG 1 TABLET: 8.6; 5 TABLET, FILM COATED ORAL at 17:02

## 2021-02-25 RX ADMIN — ACETAMINOPHEN 1000 MG: 500 TABLET ORAL at 00:31

## 2021-02-25 RX ADMIN — Medication 10 ML: at 00:33

## 2021-02-25 RX ADMIN — OXYCODONE 10 MG: 5 TABLET ORAL at 10:50

## 2021-02-25 RX ADMIN — ACETAMINOPHEN 500 MG: 500 TABLET ORAL at 23:19

## 2021-02-25 RX ADMIN — Medication 10 ML: at 22:00

## 2021-02-25 RX ADMIN — OXYCODONE 5 MG: 5 TABLET ORAL at 23:19

## 2021-02-25 RX ADMIN — DOCUSATE SODIUM 50 MG AND SENNOSIDES 8.6 MG 1 TABLET: 8.6; 5 TABLET, FILM COATED ORAL at 09:31

## 2021-02-25 RX ADMIN — Medication 10 ML: at 15:29

## 2021-02-25 RX ADMIN — ACETAMINOPHEN 1000 MG: 500 TABLET ORAL at 17:02

## 2021-02-25 RX ADMIN — SUMATRIPTAN SUCCINATE 100 MG: 25 TABLET ORAL at 09:31

## 2021-02-25 RX ADMIN — OXYCODONE 10 MG: 5 TABLET ORAL at 19:18

## 2021-02-25 RX ADMIN — POLYETHYLENE GLYCOL 3350 17 G: 17 POWDER, FOR SOLUTION ORAL at 09:31

## 2021-02-25 RX ADMIN — KETOROLAC TROMETHAMINE 15 MG: 30 INJECTION, SOLUTION INTRAMUSCULAR at 12:00

## 2021-02-25 RX ADMIN — OXYCODONE 10 MG: 5 TABLET ORAL at 07:50

## 2021-02-25 RX ADMIN — Medication 10 ML: at 06:49

## 2021-02-25 RX ADMIN — CEFAZOLIN SODIUM 2 G: 1 INJECTION, POWDER, FOR SOLUTION INTRAMUSCULAR; INTRAVENOUS at 03:24

## 2021-02-25 RX ADMIN — SODIUM CHLORIDE 125 ML/HR: 9 INJECTION, SOLUTION INTRAVENOUS at 01:52

## 2021-02-25 RX ADMIN — KETOROLAC TROMETHAMINE 15 MG: 30 INJECTION, SOLUTION INTRAMUSCULAR at 06:47

## 2021-02-25 RX ADMIN — OXYCODONE 10 MG: 5 TABLET ORAL at 15:28

## 2021-02-25 RX ADMIN — KETOROLAC TROMETHAMINE 15 MG: 30 INJECTION, SOLUTION INTRAMUSCULAR at 00:27

## 2021-02-25 RX ADMIN — ACETAMINOPHEN 1000 MG: 500 TABLET ORAL at 13:31

## 2021-02-25 NOTE — PROGRESS NOTES
Orthopedic Spine Progress Note  Post Op day: 1 Day Post-Op    2021 8:55 AM   Admit Date: 2021  Procedure: Procedure(s):  REVISION LAMINECTOMY L3-4 WITH REVISION FUSION L3-S1    Subjective:     Nilton Tatum has no complaints. Expected back pain. No leg pain. Tolerating diet. No N/V. Pain Control:   Pain Assessment  Pain Scale 1: Numeric (0 - 10)  Pain Intensity 1: 7  Pain Onset 1: s/p post op surgical pain  Pain Location 1: Back  Pain Orientation 1: Posterior  Pain Description 1: Aching  Pain Intervention(s) 1: Medication (see MAR)    Objective:          Physical Exam:  General:  Alert and oriented. No acute distress. Heart:  Respirations unlabored. Abdomen:   Extremities: Soft, non-tender. No evidence of cyanosis. Pulses palpable in both upper and lower extremities. Neurologic:  Musculoskeletal:  No new motor deficits. Neurovascular exam within normal limits. Sensation stable. Motor: unchanged C5-T1 and L2-S1. Josefa's sign negative in bilateral lower extremities. Calves soft, nontender upon palpation and with passive twitch. Moves both upper and lower extremities. Incision: clean, dry, and intact. No significant erythema or swelling. No active drainage noted. Vital Signs:    Blood pressure 94/60, pulse 72, temperature 97.9 °F (36.6 °C), resp. rate 18, height 5' (1.524 m), weight 46.8 kg (103 lb 2.8 oz), SpO2 100 %.   Temp (24hrs), Av.7 °F (36.5 °C), Min:96.5 °F (35.8 °C), Max:98.6 °F (37 °C)      LAB:    Recent Labs     21  0408   HGB 7.6*     Lab Results   Component Value Date/Time    Sodium 140 2021 04:08 AM    Potassium 4.1 2021 04:08 AM    Chloride 112 (H) 2021 04:08 AM    CO2 23 2021 04:08 AM    Glucose 83 2021 04:08 AM    BUN 19 2021 04:08 AM    Creatinine 0.84 2021 04:08 AM    Calcium 8.0 (L) 2021 04:08 AM       Intake/Output:701 - 1900  In: -   Out: 190 [Drains:190]  1901 - 02/25 0700  In: 2114 [P.O.:360; I.V.:1754]  Out: 760 [Urine:550; Drains:190]    PT/OT:   Gait:                    Assessment:   Patient is 1 Day Post-Op s/p Procedure(s):  REVISION LAMINECTOMY L3-4 WITH REVISION FUSION L3-S1    Plan:     1. Continue PT/OT  2. Continue established methods of pain control  3. VTE Prophylaxes - TEDS &/or SCDs   4. Monitor Hgb - asymptomatic  5. Advance diet as tolerated  6. Home health consult.        Signed By: Patricia Wallis MD

## 2021-02-25 NOTE — PROGRESS NOTES
Problem: Self Care Deficits Care Plan (Adult)  Goal: *Acute Goals and Plan of Care (Insert Text)  Description: FUNCTIONAL STATUS PRIOR TO ADMISSION: Patient was independent and active without use of DME.    HOME SUPPORT: The patient lived alone with a friend to provide assistance. Occupational Therapy Goals  Initiated 2/25/2021    1. Patient will perform lower body dressing with modified independence using Reacher, Stocking Aid, and Long [employer] PRN within 7 days. 2.  Patient will perform bathing with supervision/set-up using most appropriate DME within 7 days. 3.  Patient will toileting at modified independence within 7 days. 4.  Patient will don/doff back brace at modified independence within 7 days. 5.  Patient will verbalize/demonstrate 3/3 back precautions during ADL tasks without cues within 7 days. Outcome: Progressing Towards Goal   OCCUPATIONAL THERAPY EVALUATION  Patient: Abhilash Cowan (64 y.o. female)  Date: 2/25/2021  Primary Diagnosis: Spinal stenosis, lumbar [M48.061]  Procedure(s) (LRB):  REVISION LAMINECTOMY L3-4 WITH REVISION FUSION L3-S1 (N/A) 1 Day Post-Op   Precautions: Back       ASSESSMENT  Based on the objective data described below, the patient presents with spinal precautions, decrease mobility, pain, decrease balance, generalized weakness and limited independence with self-care s/p recent revision laminectomy/fusion L3-4 POD #1. Pt received supine in bed, min assist with bed mobility and SBA to don brace, ambulates with RW to access bathroom with min assist. Reviewed back precautions, sitting limit of 30-45 minutes, positioning in chair, and progress. Demonstrated and verbalized understanding. Pt is NOT cleared for discharge from [place] [address] standpoint at this time. Will benefit from therapy in acute setting, anticipate will improve tolerance quickly with improved pain control.        Current Level of Function Impacting Discharge (ADLs/self-care): Functional Outcome Measure: The patient scored 55/100 on the Barthel outcome measure which is indicative of impairment. Other factors to consider for discharge: supportive friend coming to live with her for a few weeks     Patient will benefit from skilled therapy intervention to address the above noted impairments. PLAN :  Recommendations and Planned Interventions: self care training, functional mobility training, balance training, therapeutic activities, endurance activities, patient education, and home safety training    Frequency/Duration: Patient will be followed by occupational therapy 5 times a week to address goals. Recommendation for discharge: (in order for the patient to meet his/her long term goals)  No skilled occupational therapy/ follow up rehabilitation needs identified at this time. This discharge recommendation:  A follow-up discussion with the attending provider and/or case management is planned    IF patient discharges home will need the following DME: none       SUBJECTIVE:   Patient stated I work about 15 hour days.     OBJECTIVE DATA SUMMARY:   HISTORY:   Past Medical History:   Diagnosis Date    Arthritis     Other ill-defined conditions(309.89)     MIGRAINES     Past Surgical History:   Procedure Laterality Date    HX GI      COLONOSCOPY MULTIPLE. HX GI      ENDOSCOPY    HX GYN       X 1    HX HYSTERECTOMY      HX ORTHOPAEDIC      Spinal fusion at L4-L5    HX OTHER SURGICAL      DIAGNOSTIC EXPLORATORY SURGERIES MULTIPLE.     HX WISDOM TEETH EXTRACTION       X 4    IR INJ FORAMIN EPID LUMB ANES/STER SNGL  12/3/2020    NEUROLOGICAL PROCEDURE UNLISTED      LUMBAR FUSION WITH RODS & SCREWS       Expanded or extensive additional review of patient history:     Home Situation  Home Environment: Apartment  # Steps to Enter: 0  One/Two Story Residence: One story  Living Alone: Yes  Support Systems: Friends \ neighbors  Patient Expects to be Discharged to[de-identified] Apartment  Current DME Used/Available at Home: Shower chair, Walker, rolling  Tub or Shower Type: Shower        EXAMINATION OF PERFORMANCE DEFICITS:  Cognitive/Behavioral Status:  Neurologic State: Alert  Orientation Level: Oriented X4  Cognition: Appropriate decision making        Safety/Judgement: Awareness of environment    Skin: intact    Edema: none    Hearing:       Vision/Perceptual:                                     Range of Motion:    AROM: Within functional limits                         Strength:    Strength: Generally decreased, functional                Coordination:  Coordination: Within functional limits  Fine Motor Skills-Upper: Left Intact; Right Intact    Gross Motor Skills-Upper: Left Intact; Right Intact    Tone & Sensation:    Tone: Normal  Sensation: Intact                      Balance:  Sitting: Intact; With support  Standing: Intact; With support    Functional Mobility and Transfers for ADLs:  Bed Mobility:  Supine to Sit: Minimum assistance  Scooting: Minimum assistance    Transfers:  Sit to Stand: Minimum assistance  Stand to Sit: Minimum assistance  Bed to Chair: Minimum assistance  Bathroom Mobility: Minimum assistance  Toilet Transfer : Minimum assistance    ADL Assessment:  Feeding: Independent    Oral Facial Hygiene/Grooming: Independent    Bathing: Minimum assistance; Moderate assistance    Upper Body Dressing: Minimum assistance    Lower Body Dressing: Moderate assistance;Maximum assistance    Toileting: Minimum assistance                ADL Intervention and task modifications:            Cognitive Retraining  Safety/Judgement: Awareness of environment    Therapeutic Exercise:     Functional Measure:  Barthel Index:    Bathin  Bladder: 0  Bowels: 10  Groomin  Dressin  Feeding: 10  Mobility: 10  Stairs: 0  Toilet Use: 5  Transfer (Bed to Chair and Back): 10  Total: 55/100        The Barthel ADL Index: Guidelines  1.  The index should be used as a record of what a patient does, not as a record of what a patient could do. 2. The main aim is to establish degree of independence from any help, physical or verbal, however minor and for whatever reason. 3. The need for supervision renders the patient not independent. 4. A patient's performance should be established using the best available evidence. Asking the patient, friends/relatives and nurses are the usual sources, but direct observation and common sense are also important. However direct testing is not needed. 5. Usually the patient's performance over the preceding 24-48 hours is important, but occasionally longer periods will be relevant. 6. Middle categories imply that the patient supplies over 50 per cent of the effort. 7. Use of aids to be independent is allowed. Fede Erickson., Barthel, D.W. (3191). Functional evaluation: the Barthel Index. [facility] (14)2. Valeria Rocha kyle ADRI Santos, Juana Helton., Severa Mouse., [place], [facility] (1999). Measuring the change indisability after inpatient rehabilitation; comparison of the responsiveness of the Barthel Index and Functional Assumption Measure. Journal of Neurology, Neurosurgery, and Psychiatry, 66(4), 971-775. Rebekah Sam, N.J.A, Chris Barnes,  MARCOS.J.M, & Helga Banegas, M.A. (2004.) Assessment of post-stroke quality of life in cost-effectiveness studies: The usefulness of the Barthel Index and the EuroQoL-5D.  Quality of Life Research, 15, 098-11        Occupational Therapy Evaluation Charge Determination   History Examination Decision-Making   LOW Complexity : Brief history review  LOW Complexity : 1-3 performance deficits relating to physical, cognitive , or psychosocial skils that result in activity limitations and / or participation restrictions  LOW Complexity : No comorbidities that affect functional and no verbal or physical assistance needed to complete eval tasks       Based on the above components, the patient evaluation is determined to be of the following complexity level: LOW   Pain Ratin/10p!, nursing notified    Activity Tolerance:   Good    After treatment patient left in no apparent distress:    Sitting in chair and Call bell within reach    COMMUNICATION/EDUCATION:   The patients plan of care was discussed with: Physical therapist and Registered nurse. Home safety education was provided and the patient/caregiver indicated understanding., Patient/family have participated as able in goal setting and plan of care. , and Patient/family agree to work toward stated goals and plan of care. This patients plan of care is appropriate for delegation to [facility].     Thank you for this referral.  Tres Cazares OTR/L  Time Calculation: 41 mins

## 2021-02-25 NOTE — PROGRESS NOTES
Pt currently resting and OT reports pt had headache. Pt does not respond to verbal stimuli, defer until patient awakens. Checked again  At noon, currently still sleeping and does not rouse.     Ladi Teran, DPT, PT

## 2021-02-25 NOTE — PROGRESS NOTES
Nikki VILLARREAL, notified and aware that patient's blood pressure systolic is 37'G - low 446'W and diastolic 02'A-57'X even while she was in PACU. Layton catheter urine output is 150 ml since 17:00. Hemovac output this evening is 190. Patient is also 100 pounds. RN inquiring if a bolus of fluid should be given. [place] VILLARREAL ordered,\"Normal Saline 500 ml bolus x 1 dose. \" RN will implement PA's orders.

## 2021-02-25 NOTE — OP NOTES
[address]   OPERATIVE REPORT    Name:  Merlin Potters  MR#:  [id number]  :  1955  ACCOUNT #:  [de-identified]  DATE OF SERVICE:  2021    PREOPERATIVE DIAGNOSES:  Status post L4-S1 fusion; lumbar degenerative scoliosis; lumbar stenosis, L3-4. POSTOPERATIVE DIAGNOSES:  Status post L4-S1 fusion; lumbar degenerative scoliosis; lumbar stenosis, L3-4. PROCEDURES PERFORMED:  Exploration of fusion; revision laminectomy, L2-3, L3-4 and; revision fusion, L4 to S1 with posterior segmental instrumentation; posterior transforaminal interbody fusion, L3-4. SURGEON:  Jumana Mendez MD    ASSISTANT:  Ketty Birch PA-C    ANESTHESIA:  General.    COMPLICATIONS:  None. SPECIMENS REMOVED:  None. IMPLANTS:  Instrumentation includes Orthofix Janus screws and Orthofix FXP. ESTIMATED BLOOD LOSS:  100 mL. DRAINS:  One mini Hemovac. INDICATIONS:  This is a pleasant 80-year-old female who has had had multiple lumbar procedures, the last procedure was in L4 to S1 revision fusion, now has developed junctional stenosis at L3-4. She has failed conservative treatment with injection therapy with ongoing radicular symptoms, left greater than right. She is for extension of her fusion. PROCEDURE:  The patient was identified, brought to the operative suite and underwent general anesthesia without difficulty. Layton catheter was placed. Preoperative neuromonitoring was placed. Baselines were obtained. These remained stable throughout the surgical procedure. The patient was placed prone on the open [facility] table with all bony prominences well-padded. Back was prepped and draped sterilely. After prepping and draping, time-out was performed. A midline incision was made with dissection to the thoracodorsal fascia. We carefully exposed the previous instrumentation hardware from L4 to S1.   We also exposed the L3-4 facet joint and left the capsule intact and then also exposed the transverse processes of L3, carefully dissecting this out laterally. At which time, deep retractors were placed. We then removed the previous hardware without difficult to explore the fusion mass, which was intact. We then cleaned soft tissue out of the fusion mass for subsequent augmentation of the fusion extension to L3. We then started wide revision laminectomy with removal of the same with the residual of the L3 lamina, carrying this out proximally with decompression of the central canal.  We exposed proximally undercutting the L2 lamina with release of the ligamentum there. Pedicles of L3 were identified and the exiting L3 nerve root was visualized and decompressed bilaterally, left greater than right. We then removed the previous instrumentation from L4 to S1, replaced it with Orthofix Janus screws to augment the fusion. We then cannulated the pedicles of L3 bilaterally using standard bony landmarks.  hole started with Midas gal with a 5.5 gal and then followed by straight gearshift. We then tested with neuromonitoring and palpated each hole for no medial breaches. The wound was thoroughly irrigated. We placed 6.5 x 45 mm Orthofix Janus screws at the L3 level. At that time, we then did an annulotomy on the left hand side and did a diskectomy with pituitary rongeurs, curved curette, irlanda with distraction of the disk space completely. Wounds were thoroughly irrigated. We then packed a small MagniFuse into the anterior one-third column, followed by a Lynder Ariana XP implant likely across the disk space. This is a 24 mm length implant 10.6 mm height. At that time, the wounds were irrigated again with pulse irrigation as well as Irrisept. We decorticated the facets and the L3 spinous process as well as the L3-4 facets in the previous fusion mass. Packed bone graft into the lateral gutters.   70 mm titanium rods were attached to the pedicle screws and set screws and final tightening was performed. Stable fixation obtained. Final x-rays demonstrated stable alignment. Wounds were thoroughly irrigated. Mini Hemovac placed. 0 Vicryl on the fascial layer, 2-0 Vicryl on the subcutaneous layer, and 3-0 Vicryl on the skin. The physician assistant was present during the entire operative procedure and assisted in all critical elements of the surgery. No surgical assist or resident was available.      MD ADAM Garcia/[email or web address]  D:  02/24/2021 13:05  T:  02/24/2021 22:55  JOB #:  [id number]

## 2021-02-25 NOTE — PROGRESS NOTES
Attempted to see patient, pt had just gotten back to bed from bathroom. Pt tolerates sit to supine but unable to participate in therapy evaluation d/t pain. Pt given hot pack for neck due to SCM pain and spasm. Will continue to follow.      Stephany Lane, DAYOT, PT

## 2021-02-25 NOTE — PROGRESS NOTES
Primary Nurse Donald Peter RN and Brian Rogers RN performed a dual skin assessment on this patient No impairment noted  Omari score is 20

## 2021-02-26 LAB — HGB BLD-MCNC: 9.1 G/DL (ref 11.5–16)

## 2021-02-26 PROCEDURE — 74011250637 HC RX REV CODE- 250/637: Performed by: PHYSICIAN ASSISTANT

## 2021-02-26 PROCEDURE — 97161 PT EVAL LOW COMPLEX 20 MIN: CPT

## 2021-02-26 PROCEDURE — 36415 COLL VENOUS BLD VENIPUNCTURE: CPT

## 2021-02-26 PROCEDURE — 65270000029 HC RM PRIVATE

## 2021-02-26 PROCEDURE — 85018 HEMOGLOBIN: CPT

## 2021-02-26 PROCEDURE — 97116 GAIT TRAINING THERAPY: CPT

## 2021-02-26 PROCEDURE — 97530 THERAPEUTIC ACTIVITIES: CPT

## 2021-02-26 RX ORDER — OXYCODONE HYDROCHLORIDE 5 MG/1
5-10 TABLET ORAL
Qty: 60 TAB | Refills: 0 | Status: SHIPPED | OUTPATIENT
Start: 2021-02-26 | End: 2021-03-05

## 2021-02-26 RX ORDER — NALOXONE HYDROCHLORIDE 4 MG/.1ML
SPRAY NASAL
Qty: 2 EACH | Refills: 0 | Status: SHIPPED | OUTPATIENT
Start: 2021-02-26

## 2021-02-26 RX ORDER — ONDANSETRON 4 MG/1
4 TABLET, ORALLY DISINTEGRATING ORAL
Status: DISCONTINUED | OUTPATIENT
Start: 2021-02-26 | End: 2021-02-28 | Stop reason: HOSPADM

## 2021-02-26 RX ADMIN — ONDANSETRON 4 MG: 4 TABLET, ORALLY DISINTEGRATING ORAL at 11:59

## 2021-02-26 RX ADMIN — CYCLOBENZAPRINE 10 MG: 10 TABLET, FILM COATED ORAL at 13:43

## 2021-02-26 RX ADMIN — DOCUSATE SODIUM 50 MG AND SENNOSIDES 8.6 MG 1 TABLET: 8.6; 5 TABLET, FILM COATED ORAL at 17:37

## 2021-02-26 RX ADMIN — POLYETHYLENE GLYCOL 3350 17 G: 17 POWDER, FOR SOLUTION ORAL at 08:53

## 2021-02-26 RX ADMIN — OXYCODONE 10 MG: 5 TABLET ORAL at 02:19

## 2021-02-26 RX ADMIN — OXYCODONE 10 MG: 5 TABLET ORAL at 17:37

## 2021-02-26 RX ADMIN — OXYCODONE 10 MG: 5 TABLET ORAL at 14:45

## 2021-02-26 RX ADMIN — OXYCODONE 10 MG: 5 TABLET ORAL at 05:23

## 2021-02-26 RX ADMIN — DOCUSATE SODIUM 50 MG AND SENNOSIDES 8.6 MG 1 TABLET: 8.6; 5 TABLET, FILM COATED ORAL at 08:53

## 2021-02-26 RX ADMIN — Medication 10 ML: at 06:00

## 2021-02-26 RX ADMIN — ACETAMINOPHEN 1000 MG: 500 TABLET ORAL at 05:23

## 2021-02-26 RX ADMIN — ACETAMINOPHEN 1000 MG: 500 TABLET ORAL at 17:37

## 2021-02-26 RX ADMIN — OXYCODONE 10 MG: 5 TABLET ORAL at 08:53

## 2021-02-26 RX ADMIN — Medication 10 ML: at 22:41

## 2021-02-26 RX ADMIN — OXYCODONE 10 MG: 5 TABLET ORAL at 21:02

## 2021-02-26 RX ADMIN — OXYCODONE 10 MG: 5 TABLET ORAL at 11:47

## 2021-02-26 NOTE — DISCHARGE INSTRUCTIONS
After Hospital Care Plan:  Discharge Instructions Lumbar Fusion Surgery   Dr. Camila Clifton   Patient Name: Michelle Berumen    Date of procedure: 2/24/2021  Date of discharge: 2/26/2021    Procedure: Procedure(s):  REVISION LAMINECTOMY L3-4 WITH REVISION FUSION L3-S1  PCP: Marcus Berrios MD    Follow up appointments  -follow up with Dr. Dr. Camila Clifton in 2 weeks. Call [phone] to make an appointment as soon as you get home from the hospital.    [address] [place], [place] [address]y: ____________________   phone: _______________________  The agency will contact you to arrange dates/times for visits. Please call them if you do not hear from them within 24 hours after you are discharged  Physical therapy 3 times a week for 3 weeks  Nursing-initial assessment and as needed    When to call your Orthopaedic Surgeon:  -Signs of infection-if your incision is red; continues to have drainage; drainage has a foul odor or if you have a persistent fever over 101 degrees for 24 hours  -Nausea or vomiting, severe headache  -Loss of bowel or bladder function, inability to urinate  -Changes in sensation in your arms or legs (numbness, tingling, loss of color)  -Increased weakness-greater than before your surgery  -Severe pain or pain not relieved by medications  -Signs of a blood clot in your leg-calf pain, tenderness, redness, swelling of lower leg    When to call your Primary Care Physician:  -Concerns about medical conditions such as diabetes, high blood pressure, asthma, congestive heart failure  -Call if blood sugars are elevated, persistent headache or dizziness, coughing or congestion, constipation or diarrhea, burning with urination, abnormal heart rate    When to call 911 and go to the nearest emergency room:  -Acute onset of chest pain, shortness of breath, difficulty breathing    Activity  -You are going home a well person, be as active as possible. Your only exercise should be walking.   Start with short frequent walks and increase your walking distance each day.  -Limit the amount of time you sit to 20-30 minute intervals. Sitting for prolonged periods of time will be uncomfortable for you following surgery.  -Do NOT lift anything over 5 pounds  -Do NOT do any straining, twisting or bending  -When you are in bed, you may lay on your back or on either side. Do NOT lie on your stomach    Brace  -If you have a back brace, you should wear your brace at all times when you are out of bed. Do not wear the brace while in bed or showering.  -Remember to always wear a cotton t-shirt underneath your brace.  -Do not bend or twist when your brace is off    Diet  -Resume usual diet; drink plenty of fluids; eat foods high in fiber  -It is important to have regular bowel movements. Pain medications may cause constipation. You may want to take a stool softener (such as Senokot-S or Colace) to prevent constipation.   -If constipation occurs, take a laxative (such as Dulcolax tablets, Milk of Magnesia, or a suppository). Laxatives should only be used if the above preventable measures have failed and you still have not had a bowel movement after three days    Driving  -You may not drive or return to work until instructed by your physician. However, you may ride in the car for short periods of time. Incision Care  Your incision has been closed with absorbable sutures and the Zipline skin closure system. This will assist with healing. The Divya Grater is to remain on your incision for 2 weeks. A dry dressing (ABD and tape) will be placed over it and should be changed daily, for at least the first several days after your surgery. If you have no incisional drainage, you may leave the incision open to air if you wish, still leaving the Zipline in place. Please make sure to wash your hands prior to touching your dressing. You may take brief showers but do not run the water directly onto the wound.  After your shower, blot your incision dry with a soft towel and replace the dry dressing. Do not allow the tape to come in contact with the Zipline. Do not rub or apply any lotions or ointments to your incision site. Do not soak or scrub your wound. The Zipline dressing will be removed during your two week follow-up appointment. If you experience drainage leaking from underneath the Zipline or if it peels off before 2 weeks, please contact your orthopedic surgeons office. Showering  -You may shower in approximately 4 days after your surgery.    -Leave the dressing on during your shower. Do NOT allow the water to run directly onto your dressing. Once you get out of the shower, gently pat the dressing dry. -Reminder- your brace can be removed while showering. Remember to not bend or twist while your brace is off.    -Do not take a tub bath. Preventing blood clots  -You have been given T.E.D. stockings to wear. Continue to wear these for 7 days after your discharge. Put them on in the morning and take them off at night.    -They are used to increase your circulation and prevent blood clots from forming in your legs  -T. E.D. stockings can be machine washed, temperature not to exceed 160° F (71°C) and machine dried for 15 to 20 minutes, temperature not to exceed 250° F (121°C). Pain management  -Take pain medication as prescribed; decrease the amount you use as your pain lessens  -DO not wait until you are in extreme pain to take your medication.  -Avoid alcoholic beverages while taking pain medication    Pain Medication Safety  DO:  -Read the Medication Guide   -Take your medicine exactly as prescribed   -Store your medicine away from children and in a safe place   -Flush unused medicine down the toilet   -Call your healthcare provider for medical advice about side effects. You may report side effects to FDA at 6-398-FDA-2189.   -Please be aware that many medications contain Tylenol.   We do not want you to over medicate so please read the information below as a guide. Do not take more than 4 Grams of Tylenol in a 24 hour period. (There are 1000 milligrams in one Gram)                                                                                                                                                                                                                                                Percocet contains 325 mg of Tylenol per tablet (do not take more than 12 tablets in 24 hours)  Lortab contains 500 mg of Tylenol per tablet (do not take more than 8 tablets in 24 hours)  Norco contains 325 mg of Tylenol per tablet (do not take more than 12 tablets in 24 hours). DO NOT:  -Do not give your medicine to others   -Do not take medicine unless it was prescribed for you   -Do not stop taking your medicine without talking to your healthcare provider   -Do not break, chew, crush, dissolve, or inject your medicine. If you cannot swallow your medicine whole, talk to your healthcare provider.  -Do not drink alcohol while taking this medicine  -Do not take anti-inflammatory medications or aspirin unless instructed by your     physician.

## 2021-02-26 NOTE — PROGRESS NOTES
Problem: Mobility Impaired (Adult and Pediatric)  Goal: *Acute Goals and Plan of Care (Insert Text)  Description: FUNCTIONAL STATUS PRIOR TO ADMISSION: Patient was independent and active without use of DME.    HOME SUPPORT PRIOR TO ADMISSION: The patient lived alone with no local support. Physical Therapy Goals  Initiated 2/26/2021    1. Patient will move from supine to sit and sit to supine  and roll side to side in bed with independence within 4 days. 2. Patient will perform sit to stand with modified independence within 4 days. 3. Patient will ambulate with modified independence for 150 feet with the least restrictive device within 4 days. 4. Patient will verbalize and demonstrate understanding of spinal precautions (No bending, lifting greater than 5 lbs, or twisting; log-roll technique; frequent repositioning as instructed) within 4 days. 2/26/2021 1442 by Bautista Cortes  Outcome: Not Progressing Towards Goal   PHYSICAL THERAPY TREATMENT  Patient: Flory Slaughter (6 y.o. female)  Date: 2/26/2021  Diagnosis: Spinal stenosis, lumbar [M48.061] <principal problem not specified>  Procedure(s) (LRB):  REVISION LAMINECTOMY L3-4 WITH REVISION FUSION L3-S1 (N/A) 2 Days Post-Op  Precautions: Back, Fall No bending, no lifting greater than 5 lbs, no twisting, log-roll technique, repositioning every 20-30 min except when sleeping, brace when OOB   Chart, physical therapy assessment, plan of care and goals were reviewed. ASSESSMENT  Patient continues with skilled PT services and is not progressing towards goals. Pt continues to have poor activity tolerance and limited due to severe, sharp abdominal pain. Pt performed bed mobility with verbal cueing and minimal assistance to maintain neutral spine alignment. Pt donned brace with moderate assistance. She ambulated with a rolling walker to/from the doorway and assisted back to bed due to severe abdominal pain.   Pt ambulates with slow, guarded antalgic gait, decreased step clearance with a step to gait pattern. Pt returned to bed at session end. Encouraged pt to sit up in chair later this evening if pain control improves. Pt is not cleared for discharge today due to slow progress and severe pain. Current Level of Function Impacting Discharge (mobility/balance): minimal assist for all aspects of mobility, pt ambulated with rolling walker x 22 feet     Other factors to consider for discharge:   poor sitting tolerance, decreased balance, fall risk, below her baseline, lives alone, has a friend that will be assisting her following discharge           PLAN :  Patient continues to benefit from skilled intervention to address the above impairments. Continue treatment per established plan of care. to address goals. Recommendation for discharge: (in order for the patient to meet his/her long term goals)  Physical therapy at least 2 days/week in the home     This discharge recommendation:  Has not yet been discussed the attending provider and/or case management    IF patient discharges home will need the following DME:    patient owns DME required for discharge, recommended a reacher           SUBJECTIVE:   Patient stated I cannot walk. It hurts too much.     OBJECTIVE DATA SUMMARY:   Critical Behavior:  Neurologic State: Alert  Orientation Level: Oriented X4  Cognition: Appropriate decision making  Safety/Judgement: Awareness of environment    Spinal diagnosis intervention:  The patient stated 3/3 back precautions when prompted. Reviewed all 3 back precautions, log roll technique, and sitting for 30 minutes at a time. The patient required verbal cues to maintain back precautions during functional activity. Reviewed back brace application and wear schedule. Brace donned with moderate assistance       Functional Mobility Training:    Bed Mobility:  Log Rolling: Minimum assistance;Assist x1;Adaptive equipment; Additional time  Supine to Sit: Minimum assistance;Assist x1;Additional time; Adaptive equipment  Sit to Supine: Minimum assistance to lift BLE into bed  Scooting: Supervision        Transfers:  Sit to Stand: Minimum assistance;Assist x1  Stand to Sit: Contact guard assistance;Assist x1                    Balance:  Sitting: Intact  Standing: Impaired  Standing - Static: Constant support; Fair  Standing - Dynamic : Constant support; Fair  Ambulation/Gait Training:  Distance (ft): 22 Feet (ft)  Assistive Device: Brace/Splint;Gait belt;Walker, rolling  Ambulation - Level of Assistance: Assist x1;minimal assistance        Gait Abnormalities: Antalgic; Step to gait; Decreased step clearance           Stance: Left decreased  Speed/Ban: Pace decreased (<100 feet/min)  Step Length: Right shortened;Left shortened             Pain Rating:  Verbal: 10  Location: abdomen, back, LLE    Activity Tolerance:   Poor    After treatment patient left in no apparent distress:   Supine in bed and Call bell within reach    COMMUNICATION/COLLABORATION:   The patients plan of care was discussed with: Registered nurse.      Antionette Castano   Time Calculation: 19 mins

## 2021-02-26 NOTE — PROGRESS NOTES
Orthopedic Spine Progress Note  Post Op day: 2 Day Post-Op    2021 8:55 AM   Admit Date: 2021  Procedure: Procedure(s):  REVISION LAMINECTOMY L3-4 WITH REVISION FUSION L3-S1    Subjective:     Ria Varma has no complaints. Expected back pain; she describes it as quite severe. Left leg pain improved compared to pre-op. Tolerating diet. Nauseous; somewhat resolved with zofran. Pain Control:   Pain Assessment  Pain Scale 1: Numeric (0 - 10)  Pain Intensity 1: 8  Pain Onset 1: postopt  Pain Location 1: Back  Pain Orientation 1: Posterior  Pain Description 1: Constant  Pain Intervention(s) 1: Medication (see MAR)    Objective:          Physical Exam:  General:  Alert and oriented. No acute distress. Heart:  Respirations unlabored. Abdomen:   Extremities: Soft, non-tender. No evidence of cyanosis. Pulses palpable in both upper and lower extremities. Neurologic:  Musculoskeletal:  No new motor deficits. Neurovascular exam within normal limits. Sensation stable. Motor: unchanged C5-T1 and L2-S1. Josefa's sign negative in bilateral lower extremities. Calves soft, nontender upon palpation and with passive twitch. Moves both upper and lower extremities. Incision: clean, dry, and intact. No significant erythema or swelling. No active drainage noted. Vital Signs:    Blood pressure 114/81, pulse (!) 118, temperature 98.8 °F (37.1 °C), resp. rate 18, height 5' (1.524 m), weight 46.8 kg (103 lb 2.8 oz), SpO2 98 %.   Temp (24hrs), Av.8 °F (37.1 °C), Min:98.4 °F (36.9 °C), Max:99.1 °F (37.3 °C)      LAB:    Recent Labs     21  0146   HGB 9.1*     Lab Results   Component Value Date/Time    Sodium 140 2021 04:08 AM    Potassium 4.1 2021 04:08 AM    Chloride 112 (H) 2021 04:08 AM    CO2 23 2021 04:08 AM    Glucose 83 2021 04:08 AM    BUN 19 2021 04:08 AM    Creatinine 0.84 2021 04:08 AM    Calcium 8.0 (L) 2021 04:08 AM Intake/Output:No intake/output data recorded. 02/24 1901 - 02/26 0700  In: 9684 [P.O.:360; I.V.:954]  Out: 12 [Urine:150; Drains:465]      Assessment:   Patient is 1 Day Post-Op s/p Procedure(s):  REVISION LAMINECTOMY L3-4 WITH REVISION FUSION L3-S1    Plan:     1. Continue PT/OT  2. Continue established methods of pain control  3. VTE Prophylaxes - TEDS &/or SCDs   4.  Hgb improved  5. Advance diet as tolerated  6. Home health consult.    7. Nausea control    Dispo: home with home health tomorrow      Signed By: CHERELLE Keller

## 2021-02-26 NOTE — PROGRESS NOTES
Reason for Admission:   Post op revision laminectomy L3-4 with revision fusion L3-S1                   RUR 8% Low:                     Plan for utilizing home health:    [facility] has accepted patient, AVS updated    PCP: First and Last name:  Dr. Andriy Arrington   Name of Practice: [facility]   Are you a current patient: Yes/No: Yes   Approximate date of last visit: NA   Can you participate in a virtual visit with your PCP: NA                    Current Advanced Directive/Advance Care Plan: Full code, no ACP docs on file. Healthcare Decision Maker:  NA  Click here to complete [place] including selection of the Healthcare Decision Maker Relationship (ie \"Primary\")                         Transition of Care Plan:      CM met with patient to inform of CM role and to assess needs. Patient lives at home alone but her friend Benson Valdez will be staying with her for [place]ane will also provide transport home. Patient reports independence and no use of any DME. Preferred pharmacy is [facility]. CM to monitor for any additional CM needs.                Leslie Beltran, MS

## 2021-02-26 NOTE — PROGRESS NOTES
Patient to be discharged today pending PT/OT clearance. She started experiencing nausea. Informed CHERELLE Lyon and received written orders for Zofran. Patient also experiencing back pain that she describes as severe. Will continue to monitor, and stay on top of pain control and nausea management.

## 2021-02-26 NOTE — PROGRESS NOTES
Problem: Self Care Deficits Care Plan (Adult)  Goal: *Acute Goals and Plan of Care (Insert Text)  Description: FUNCTIONAL STATUS PRIOR TO ADMISSION: Patient was independent and active without use of DME.    HOME SUPPORT: The patient lived alone with a friend to provide assistance. Occupational Therapy Goals  Initiated 2/25/2021    1. Patient will perform lower body dressing with modified independence using Reacher, Stocking Aid, and Long [employer] PRN within 7 days. 2.  Patient will perform bathing with supervision/set-up using most appropriate DME within 7 days. 3.  Patient will toileting at modified independence within 7 days. 4.  Patient will don/doff back brace at modified independence within 7 days. 5.  Patient will verbalize/demonstrate 3/3 back precautions during ADL tasks without cues within 7 days. Outcome: Progressing Towards Goal   OCCUPATIONAL THERAPY TREATMENT  Patient: Ria Varma (89 y.o. female)  Date: 2/26/2021  Diagnosis: Spinal stenosis, lumbar [M48.061] <principal problem not specified>  Procedure(s) (LRB):  REVISION LAMINECTOMY L3-4 WITH REVISION FUSION L3-S1 (N/A) 2 Days Post-Op  Precautions: Back, Fall  Chart, occupational therapy assessment, plan of care, and goals were reviewed. ASSESSMENT  Patient continues with skilled OT services and is SLOWLY  progressing towards goals. Pt received seated in chair after working with PT. Pt had been sitting in chair for only 10 min and reporting 9/10p! Pt unable to focus on LB self-care training using AE d/t increase pain. Pt asking to return to bed. Overall, min assist level with sit to stand using RW and min assist with bed mobility (log roll). Pt more unsteady on feet compared to yesterday and limited by increase pain L LE > R LE. Will follow-up with pt this weekend.       Current Level of Function Impacting Discharge (ADLs): min assist bed mobility, max assist with LB self-care    Other factors to consider for discharge: pain         PLAN :  Patient continues to benefit from skilled intervention to address the above impairments. Continue treatment per established plan of care. to address goals. Recommend with staff: Roxanna Romero for all meals if able to tolerate    Recommend next OT session: LB self-care training using AE    Recommendation for discharge: (in order for the patient to meet his/her long term goals)  No skilled occupational therapy/ follow up rehabilitation needs identified at this time. This discharge recommendation:  A follow-up discussion with the attending provider and/or case management is planned    IF patient discharges home will need the following DME: none       SUBJECTIVE:   Patient stated It hurts so bad.     OBJECTIVE DATA SUMMARY:   Cognitive/Behavioral Status:  Neurologic State: Alert  Orientation Level: Oriented X4  Cognition: Appropriate decision making        Safety/Judgement: Awareness of environment    Functional Mobility and Transfers for ADLs:  Bed Mobility:  Rolling: Minimum assistance;Assist x1;Adaptive equipment; Additional time  Supine to Sit: Minimum assistance;Assist x1;Additional time; Adaptive equipment  Sit to Supine: Minimum assistance  Scooting: Supervision    Transfers:  Sit to Stand: Minimum assistance;Assist x1  Functional Transfers  Adaptive Equipment: Walker (comment)  Bed to Chair: Minimum assistance;Assist x1    Balance:  Sitting: Intact  Standing: Impaired  Standing - Static: Constant support; Fair  Standing - Dynamic : Constant support; Fair    ADL Intervention:         Cognitive Retraining  Safety/Judgement: Awareness of environment    Therapeutic Exercises:       Pain:  9/10p! Activity Tolerance:   Fair    After treatment patient left in no apparent distress:   Supine in bed and Call bell within reach    COMMUNICATION/COLLABORATION:   The patients plan of care was discussed with: Physical therapist and Registered nurse.      GLENNA Wright/L  Time Calculation: 15 mins

## 2021-02-26 NOTE — PROGRESS NOTES
Problem: Mobility Impaired (Adult and Pediatric)  Goal: *Acute Goals and Plan of Care (Insert Text)  Description: FUNCTIONAL STATUS PRIOR TO ADMISSION: Patient was independent but limited to ambulating only 30 min due to her back pain without use of DME. Pt reports she had 2 falls in the last year. HOME SUPPORT PRIOR TO ADMISSION: The patient lived alone with no local support. She has a friend that will be helping her after she is discharged. Physical Therapy Goals  Initiated 2/26/2021    1. Patient will move from supine to sit and sit to supine  and roll side to side in bed with independence within 4 days. 2. Patient will perform sit to stand with modified independence within 4 days. 3. Patient will ambulate with modified independence for 150 feet with the least restrictive device within 4 days. 4. Patient will verbalize and demonstrate understanding of spinal precautions (No bending, lifting greater than 5 lbs, or twisting; log-roll technique; frequent repositioning as instructed) within 4 days. Outcome: Progressing Towards Goal   PHYSICAL THERAPY EVALUATION  Patient: Berline Krabbe (46 y.o. female)  Date: 2/26/2021  Primary Diagnosis: Spinal stenosis, lumbar [M48.061]  Procedure(s) (LRB):  REVISION LAMINECTOMY L3-4 WITH REVISION FUSION L3-S1 (N/A) 2 Days Post-Op   Precautions:   Back, Fall, No bending, no lifting greater than 5 lbs, no twisting, log-roll technique, repositioning every 20-30 min except when sleeping, brace when OOB        ASSESSMENT  Based on the objective data described below, the patient presents with spinal precautions, decreased activity tolerance due to severe back pain, decreased standing and sitting tolerance, decreased generalized strength, balance, and impaired functional mobility below her baseline. Pt provided with education regarding back precautions, log roll technique, donning brace, and sitting restrictions.   Pt noted to be twisting while lying supine in hook position and allowing her legs to shift to the R side. Reviewed importance of avoiding this however needed reminders again. Pt completed log roll transfer with cueing and stood with walker and minimal assistance. Pt ambulated with slow, guarded gait due to severe back and LLE pain which increased with WB LLE. Pt with heavy reliance on walker and needs assistance at times to advance the walker. Pt returned to chair and encouraged to sit up as tolerated. Per OT pt only tolerated sitting ~ 10 min and was assisted back to bed. Current Level of Function Impacting Discharge (mobility/balance): minimal assist, ambulated with rolling walker x 60 feet    Functional Outcome Measure: The patient scored Total Score: 20/28 on the Tinetti outcome measure which is indicative of moderate fall risk. Other factors to consider for discharge: poor sitting tolerance, decreased balance, fall risk, below her baseline, lives alone, has a friend that will be assisting her following discharge     Patient will benefit from skilled therapy intervention to address the above noted impairments. PLAN :  Recommendations and Planned Interventions: bed mobility training, transfer training, gait training, and patient and family training/education      Frequency/Duration: Patient will be followed by physical therapy:  twice daily to address goals. Recommendation for discharge: (in order for the patient to meet his/her long term goals)  Physical therapy at least 2 days/week in the home     This discharge recommendation:  Has not yet been discussed the attending provider and/or case management    IF patient discharges home will need the following DME: patient owns DME required for discharge, recommended a reacher         SUBJECTIVE:   Patient stated It hurts when I put pressure on my left leg down.     OBJECTIVE DATA SUMMARY:   HISTORY:    Past Medical History:   Diagnosis Date    Arthritis     Other ill-defined conditions(799.89)     MIGRAINES     Past Surgical History:   Procedure Laterality Date    HX GI      COLONOSCOPY MULTIPLE.  HX GI      ENDOSCOPY    HX GYN       X 1    HX HYSTERECTOMY      HX ORTHOPAEDIC      Spinal fusion at L4-L5    HX OTHER SURGICAL      DIAGNOSTIC EXPLORATORY SURGERIES MULTIPLE.  HX WISDOM TEETH EXTRACTION       X 4    IR INJ FORAMIN EPID LUMB ANES/STER SNGL  12/3/2020    NEUROLOGICAL PROCEDURE UNLISTED      LUMBAR FUSION WITH RODS & SCREWS       Personal factors and/or comorbidities impacting plan of care: lives alone, fall risk, 2 falls in the last year    Home Situation  Home Environment: Apartment  # Steps to Enter: 0  One/Two Story Residence: One story  Living Alone: Yes  Support Systems: Friends \ neighbors  Patient Expects to be Discharged to[de-identified] Apartment  Current DME Used/Available at Home: Giselle Favia, rolling, Shower chair  Tub or Shower Type: Tub/Shower combination    EXAMINATION/PRESENTATION/DECISION MAKING:   Critical Behavior:  Neurologic State: Alert  Orientation Level: Oriented X4  Cognition: Appropriate decision making  Safety/Judgement: Awareness of environment       Skin: dressing intact    Range Of Motion:  AROM: Within functional limits                       Strength:    Strength: Generally decreased, functional                    Tone & Sensation:   Tone: Normal              Sensation: Intact               Coordination:  Coordination: Within functional limits       Functional Mobility:  Bed Mobility:  Rolling: Minimum assistance;Assist x1;Adaptive equipment; Additional time  Supine to Sit: Minimum assistance;Assist x1;Additional time; Adaptive equipment  Sit to Supine: Minimum assistance  Scooting: Supervision  Transfers:  Sit to Stand: Minimum assistance;Assist x1  Stand to Sit: Contact guard assistance;Assist x1        Bed to Chair: Minimum assistance;Assist x1              Balance:   Sitting: Intact  Standing: Impaired  Standing - Static: Constant support; Fair  Standing - Dynamic : Constant support; Fair  Ambulation/Gait Training:  Distance (ft): 60 Feet (ft)  Assistive Device: Brace/Splint;Gait belt;Walker, rolling  Ambulation - Level of Assistance: Assist x1;minimal assistance        Gait Abnormalities: Antalgic; Step to gait; Decreased step clearance, heavy reliance on walker           Stance: Left decreased  Speed/Ban: Pace decreased (<100 feet/min)  Step Length: Right shortened;Left shortened               Functional Measure:  Tinetti test:    Sitting Balance: 1  Arises: 1  Attempts to Rise: 2  Immediate Standing Balance: 1  Standing Balance: 1  Nudged: 1  Eyes Closed: 0  Turn 360 Degrees - Continuous/Discontinuous: 1  Turn 360 Degrees - Steady/Unsteady: 1  Sitting Down: 2  Balance Score: 11 Balance total score  Indication of Gait: 1  R Step Length/Height: 1  L Step Length/Height: 1  R Foot Clearance: 1  L Foot Clearance: 1  Step Symmetry: 0  Step Continuity: 1  Path: 1  Trunk: 1  Walking Time: 1  Gait Score: 9 Gait total score  Total Score: 20/28 Overall total score         Tinetti Tool Score Risk of Falls  <19 = High Fall Risk  19-24 = Moderate Fall Risk  25-28 = Low Fall Risk  Tinetti ME. Performance-Oriented Assessment of Mobility Problems in Elderly Patients. [address] 67; [id number].  (Scoring Description: PT Bulletin Feb. 10, 1993)    Older adults: Vladimir Mcadams et al, 2009; n = 1000 [facility] elderly evaluated with ABC, ZORA, ADL, and IADL)  · Mean ZORA score for males aged 69-68 years = 26.21(3.40)  · Mean ZORA score for females age 69-68 years = 25.16(4.30)  · Mean ZORA score for males over 80 years = 23.29(6.02)  · Mean ZORA score for females over 80 years = 17.20(8.32)        Physical Therapy Evaluation Charge Determination   History Examination Presentation Decision-Making   MEDIUM  Complexity : 1-2 comorbidities / personal factors will impact the outcome/ POC  MEDIUM Complexity : 3 Standardized tests and measures addressing body structure, function, activity limitation and / or participation in recreation  LOW Complexity : Stable, uncomplicated  LOW Complexity : FOTO score of       Based on the above components, the patient evaluation is determined to be of the following complexity level: LOW     Pain Rating:  Verbal: 9  Location: back, LLE    Activity Tolerance:   Fair, limited due to severe pain, only tolerated sitting in chair ~ 10 min per OT      After treatment patient left in no apparent distress:   Sitting in chair and Call bell within reach    COMMUNICATION/EDUCATION:   The patients plan of care was discussed with: Registered nurse. Fall prevention education was provided and the patient/caregiver indicated understanding. and Patient/family agree to work toward stated goals and plan of care.     Thank you for this referral.  Antionette Borja   Time Calculation: 21 mins

## 2021-02-27 PROCEDURE — 97116 GAIT TRAINING THERAPY: CPT

## 2021-02-27 PROCEDURE — 65270000029 HC RM PRIVATE

## 2021-02-27 PROCEDURE — 97530 THERAPEUTIC ACTIVITIES: CPT

## 2021-02-27 PROCEDURE — 74011250637 HC RX REV CODE- 250/637: Performed by: PHYSICIAN ASSISTANT

## 2021-02-27 RX ADMIN — ACETAMINOPHEN 1000 MG: 500 TABLET ORAL at 00:30

## 2021-02-27 RX ADMIN — ACETAMINOPHEN 1000 MG: 500 TABLET ORAL at 06:29

## 2021-02-27 RX ADMIN — OXYCODONE 10 MG: 5 TABLET ORAL at 00:31

## 2021-02-27 RX ADMIN — OXYCODONE 10 MG: 5 TABLET ORAL at 06:29

## 2021-02-27 RX ADMIN — POLYETHYLENE GLYCOL 3350 17 G: 17 POWDER, FOR SOLUTION ORAL at 09:36

## 2021-02-27 RX ADMIN — FUROSEMIDE 20 MG: 20 TABLET ORAL at 09:36

## 2021-02-27 RX ADMIN — OXYCODONE 10 MG: 5 TABLET ORAL at 09:36

## 2021-02-27 RX ADMIN — OXYCODONE 10 MG: 5 TABLET ORAL at 15:53

## 2021-02-27 RX ADMIN — DOCUSATE SODIUM 50 MG AND SENNOSIDES 8.6 MG 1 TABLET: 8.6; 5 TABLET, FILM COATED ORAL at 09:36

## 2021-02-27 RX ADMIN — DOCUSATE SODIUM 50 MG AND SENNOSIDES 8.6 MG 1 TABLET: 8.6; 5 TABLET, FILM COATED ORAL at 18:00

## 2021-02-27 RX ADMIN — Medication 10 ML: at 06:33

## 2021-02-27 RX ADMIN — OXYCODONE 10 MG: 5 TABLET ORAL at 03:25

## 2021-02-27 RX ADMIN — ONDANSETRON 4 MG: 4 TABLET, ORALLY DISINTEGRATING ORAL at 11:46

## 2021-02-27 RX ADMIN — OXYCODONE 10 MG: 5 TABLET ORAL at 19:09

## 2021-02-27 NOTE — PROGRESS NOTES
Occupational Therapy Note    Chart reviewed and OT spoke with RN prior to session who is requesting defer OT at this time. Reports pt was working with PT and became \"woozy\" and was returned to bed. See PT note for details. Will follow up later today or tomorrow as able.     Thank you,  Jersey Batres, OTR/L

## 2021-02-27 NOTE — PROGRESS NOTES
Orthopedic Spine Progress Note  Post Op day: 3 Day Post-Op    2021 8:55 AM   Admit Date: 2021  Procedure: Procedure(s):  REVISION LAMINECTOMY L3-4 WITH REVISION FUSION L3-S1    Subjective:     Michael Grier is resting comfortably this morning. She reports her pain control is much improved this morning. Feels like the pain medicine is working better now. She ambulated with therapy yesterday. Denies extremity N/T/W. Tolerating diet. No complaints this morning. Pain Control:   Pain Assessment  Pain Scale 1: Numeric (0 - 10)  Pain Intensity 1: 5  Pain Onset 1: postopt  Pain Location 1: Back  Pain Orientation 1: Lower  Pain Description 1: Throbbing  Pain Intervention(s) 1: Medication (see MAR)    Objective:          Physical Exam:  General:  Alert and oriented. No acute distress. Heart:  Respirations unlabored. Abdomen:   Extremities: Soft, non-tender. No evidence of cyanosis. Pulses palpable in both upper and lower extremities. Neurologic:  Musculoskeletal:  No new motor deficits. Neurovascular exam within normal limits. Sensation stable. Motor: unchanged C5-T1 and L2-S1. Josefa's sign negative in bilateral lower extremities. Calves soft, nontender upon palpation and with passive twitch. Moves both upper and lower extremities. Incision: clean, dry, and intact. No significant erythema or swelling. No active drainage noted. Vital Signs:    Blood pressure 129/82, pulse 84, temperature 98.4 °F (36.9 °C), resp. rate 16, height 5' (1.524 m), weight 46.8 kg (103 lb 2.8 oz), SpO2 98 %.   Temp (24hrs), Av.5 °F (36.9 °C), Min:98.1 °F (36.7 °C), Max:98.8 °F (37.1 °C)      LAB:    Recent Labs     21  0146   HGB 9.1*     Lab Results   Component Value Date/Time    Sodium 140 2021 04:08 AM    Potassium 4.1 2021 04:08 AM    Chloride 112 (H) 2021 04:08 AM    CO2 23 2021 04:08 AM    Glucose 83 2021 04:08 AM    BUN 19 2021 04:08 AM Creatinine 0.84 02/25/2021 04:08 AM    Calcium 8.0 (L) 02/25/2021 04:08 AM       Intake/Output:No intake/output data recorded. 02/25 1901 - 02/27 0700  In: -   Out: 20 [Drains:20]      Assessment:   Patient is 3 Day Post-Op s/p Procedure(s):  REVISION LAMINECTOMY L3-4 WITH REVISION FUSION L3-S1    Plan:     1. Continue PT/OT  2. Continue established methods of pain control  3. VTE Prophylaxes - TEDS &/or SCDs   4.  Hgb improved  5. Regular diet  6. Home health consult.    7.  Nausea control    Dispo: home with home health, likely today      Signed By: Hugo Malin MD

## 2021-02-27 NOTE — PROGRESS NOTES
Problem: Mobility Impaired (Adult and Pediatric)  Goal: *Acute Goals and Plan of Care (Insert Text)  Description: FUNCTIONAL STATUS PRIOR TO ADMISSION: Patient was independent and active without use of DME.    HOME SUPPORT PRIOR TO ADMISSION: The patient lived alone with no local support. Physical Therapy Goals  Initiated 2/26/2021    1. Patient will move from supine to sit and sit to supine  and roll side to side in bed with independence within 4 days. 2. Patient will perform sit to stand with modified independence within 4 days. 3. Patient will ambulate with modified independence for 150 feet with the least restrictive device within 4 days. 4. Patient will verbalize and demonstrate understanding of spinal precautions (No bending, lifting greater than 5 lbs, or twisting; log-roll technique; frequent repositioning as instructed) within 4 days. Outcome: Progressing Towards Goal   PHYSICAL THERAPY TREATMENT  Patient: Ria Varma (4 y.o. female)  Date: 2/27/2021  Diagnosis: Spinal stenosis, lumbar [M48.061] <principal problem not specified>  Procedure(s) (LRB):  REVISION LAMINECTOMY L3-4 WITH REVISION FUSION L3-S1 (N/A) 3 Days Post-Op  Precautions: Back, Fall No bending, no lifting greater than 5 lbs, no twisting, log-roll technique, repositioning every 20-30 min except when sleeping, brace when OOB (if ordered)  Chart, physical therapy assessment, plan of care and goals were reviewed. ASSESSMENT  Patient continues with skilled PT services and is progressing towards goals. Patient agreeable to work with PT and reports that abdominal pain has improved from yesterday. Patient states that she still has not had a bowel movement. Patient required min A to don brace at EOB. Patient able to amb first 50 ft with RW with CGA, however, mid walk patient started to c/o of feeling \"woozy\" and dizzy.   Patient insistent to return to room, however, gait quality decreased and patient required min-mod A for overall safety to return to bed. RN notified. Patient's vitals stable upon assessment in supine. Nausea improved some, however, patient reports that she still felt like she could vomit. Patient unable to complete stairs today due to the above. Recommend stair assessment tomorrow. Current Level of Function Impacting Discharge (mobility/balance): Bed mobility and transfers supervision to SBA; however, when patient started to feel back required min A for safety (feeling dizzy and nauseous); Gait x 100 ft with RW with CGA, but progressed to needing min-mod A due to feeling \"wobbly\" and small balance losses    Other factors to consider for discharge: Tolerance to activity without feeling dizzy or nauseous         PLAN :  Patient continues to benefit from skilled intervention to address the above impairments. Continue treatment per established plan of care. to address goals. Recommendation for discharge: (in order for the patient to meet his/her long term goals)  Physical therapy at least 2 days/week in the home     This discharge recommendation:  Has not yet been discussed the attending provider and/or case management    IF patient discharges home will need the following DME: to be determined (TBD)       SUBJECTIVE:   Patient stated I am feeling better today    OBJECTIVE DATA SUMMARY:   Critical Behavior:  Neurologic State: Alert  Orientation Level: Oriented X4  Cognition: Appropriate decision making, Appropriate for age attention/concentration, Appropriate safety awareness  Safety/Judgement: Awareness of environment    Spinal diagnosis intervention:  The patient stated 3/3 back precautions when prompted. Reviewed all 3 back precautions, log roll technique, and sitting for 30 minutes at a time.     Functional Mobility Training:    Bed Mobility:  Log Rolling: Supervision;Stand-by assistance  Supine to Sit: Supervision;Stand-by assistance  Sit to Supine: Minimum assistance  Scooting: Supervision Transfers:  Sit to Stand: Contact guard assistance  Stand to Sit: Minimum assistance              Balance:  Sitting: Intact  Standing: Impaired  Standing - Static: Fair  Standing - Dynamic : Constant support  Ambulation/Gait Training:  Distance (ft): 100 Feet (ft)  Assistive Device: Brace/Splint;Gait belt;Walker, rolling  Ambulation - Level of Assistance: Contact guard assistance; Moderate assistance        Gait Abnormalities: Antalgic;Decreased step clearance           Stance: Left decreased  Speed/Ban: Pace decreased (<100 feet/min); Slow  Step Length: Left shortened;Right shortened             Stairs:   Unable due to woozy and dizzy during walk           Pain Ratin/10 in lower back; RN notified    Activity Tolerance:   Fair and Poor; patient started to c/o of feeling nauseous and dizzy mid walk and started to become more wobbly requiring mod A to safely amb back to bed    After treatment patient left in no apparent distress:   Supine in bed and Call bell within reach    COMMUNICATION/COLLABORATION:   The patients plan of care was discussed with: Registered nurse.      Carolina Longoria PT   Time Calculation: 30 mins

## 2021-02-28 VITALS
RESPIRATION RATE: 18 BRPM | HEART RATE: 84 BPM | HEIGHT: 60 IN | OXYGEN SATURATION: 98 % | DIASTOLIC BLOOD PRESSURE: 60 MMHG | SYSTOLIC BLOOD PRESSURE: 110 MMHG | TEMPERATURE: 97.9 F | WEIGHT: 103.17 LBS | BODY MASS INDEX: 20.26 KG/M2

## 2021-02-28 PROCEDURE — 97535 SELF CARE MNGMENT TRAINING: CPT

## 2021-02-28 PROCEDURE — 74011250637 HC RX REV CODE- 250/637: Performed by: PHYSICIAN ASSISTANT

## 2021-02-28 PROCEDURE — 97116 GAIT TRAINING THERAPY: CPT

## 2021-02-28 RX ADMIN — ACETAMINOPHEN 1000 MG: 500 TABLET ORAL at 12:44

## 2021-02-28 RX ADMIN — OXYCODONE 10 MG: 5 TABLET ORAL at 12:44

## 2021-02-28 RX ADMIN — ACETAMINOPHEN 1000 MG: 500 TABLET ORAL at 00:28

## 2021-02-28 RX ADMIN — POLYETHYLENE GLYCOL 3350 17 G: 17 POWDER, FOR SOLUTION ORAL at 09:38

## 2021-02-28 RX ADMIN — ACETAMINOPHEN 1000 MG: 500 TABLET ORAL at 06:39

## 2021-02-28 RX ADMIN — ONDANSETRON 4 MG: 4 TABLET, ORALLY DISINTEGRATING ORAL at 11:45

## 2021-02-28 RX ADMIN — OXYCODONE 10 MG: 5 TABLET ORAL at 09:36

## 2021-02-28 RX ADMIN — DOCUSATE SODIUM 50 MG AND SENNOSIDES 8.6 MG 1 TABLET: 8.6; 5 TABLET, FILM COATED ORAL at 09:36

## 2021-02-28 NOTE — PROGRESS NOTES
Problem: Mobility Impaired (Adult and Pediatric)  Goal: *Acute Goals and Plan of Care (Insert Text)  Description: FUNCTIONAL STATUS PRIOR TO ADMISSION: Patient was independent and active without use of DME.    HOME SUPPORT PRIOR TO ADMISSION: The patient lived alone with no local support. Physical Therapy Goals  Initiated 2/26/2021    1. Patient will move from supine to sit and sit to supine  and roll side to side in bed with independence within 4 days. 2. Patient will perform sit to stand with modified independence within 4 days. 3. Patient will ambulate with modified independence for 150 feet with the least restrictive device within 4 days. 4. Patient will verbalize and demonstrate understanding of spinal precautions (No bending, lifting greater than 5 lbs, or twisting; log-roll technique; frequent repositioning as instructed) within 4 days. Outcome: Progressing Towards Goal     PHYSICAL THERAPY TREATMENT  Patient: Juanito King (66 y.o. female)  Date: 2/28/2021  Diagnosis: Spinal stenosis, lumbar [M48.061] <principal problem not specified>  Procedure(s) (LRB):  REVISION LAMINECTOMY L3-4 WITH REVISION FUSION L3-S1 (N/A) 4 Days Post-Op  Precautions: Back, Fall No bending, no lifting greater than 5 lbs, no twisting, log-roll technique, repositioning every 20-30 min except when sleeping, brace when OOB (if ordered)  Chart, physical therapy assessment, plan of care and goals were reviewed. ASSESSMENT  Patient continues with skilled PT services and is progressing towards goals. Patient overall supervision for transfers due to pain and nausea. Ambulated 125 feet with RW and supervision, slow gait speed noted but no LOB. Patient reports no stairs to navigate in the home. Patient requesting to return to bed due to nausea. Mod I for bed mobility using log roll technique. Patient is cleared for discharge from PT standpoint, RN aware. Recommend HHPT.      Current Level of Function Impacting Discharge (mobility/balance): supervision to mod I    Other factors to consider for discharge:          PLAN :  Patient continues to benefit from skilled intervention to address the above impairments. Continue treatment per established plan of care. to address goals. Recommendation for discharge: (in order for the patient to meet his/her long term goals)  Physical therapy at least 2 days/week in the home AND ensure assist and/or supervision for safety with mobility    This discharge recommendation:  Has been made in collaboration with the attending provider and/or case management    IF patient discharges home will need the following DME: patient owns DME required for discharge       SUBJECTIVE:   Patient stated I'm just nauseated and need to get home.     OBJECTIVE DATA SUMMARY:   Critical Behavior:  Neurologic State: Alert  Orientation Level: Oriented X4  Cognition: Appropriate for age attention/concentration, Appropriate decision making, Appropriate safety awareness  Safety/Judgement: Awareness of environment    Spinal diagnosis intervention:  The patient stated 3/3 back precautions when prompted. Reviewed all 3 back precautions, log roll technique, and sitting for 30 minutes at a time. The patient required no cues to maintain back precautions during functional activity. Functional Mobility Training:    Bed Mobility:  Log Rolling: Modified independent  Supine to Sit: (received up in chair)  Sit to Supine: Modified independent           Transfers:  Sit to Stand: Supervision  Stand to Sit: Supervision  Stand Pivot Transfers: Supervision                          Balance:  Sitting: Intact  Standing: Intact; With support  Standing - Static: Constant support  Standing - Dynamic : Constant support  Ambulation/Gait Training:  Distance (ft): 125 Feet (ft)  Assistive Device: Brace/Splint;Gait belt;Walker, rolling  Ambulation - Level of Assistance: Supervision        Gait Abnormalities: Antalgic;Decreased step clearance Speed/Ban: Pace decreased (<100 feet/min)  Step Length: Right shortened;Left shortened                    Stairs: Therapeutic Exercises:     Pain Rating:      Activity Tolerance:   Good    After treatment patient left in no apparent distress:   Supine in bed and Call bell within reach    COMMUNICATION/COLLABORATION:   The patients plan of care was discussed with: Occupational therapist and Registered nurse.      Lani Dowling, PT   Time Calculation: 12 mins

## 2021-02-28 NOTE — PROGRESS NOTES
Problem: Self Care Deficits Care Plan (Adult)  Goal: *Acute Goals and Plan of Care (Insert Text)  Description: FUNCTIONAL STATUS PRIOR TO ADMISSION: Patient was independent and active without use of DME.    HOME SUPPORT: The patient lived alone with a friend to provide assistance. Occupational Therapy Goals  Initiated 2/25/2021    1. Patient will perform lower body dressing with modified independence using Reacher, Stocking Aid, and Long [employer] PRN within 7 days. 2.  Patient will perform bathing with supervision/set-up using most appropriate DME within 7 days. 3.  Patient will toileting at modified independence within 7 days. 4.  Patient will don/doff back brace at modified independence within 7 days. 5.  Patient will verbalize/demonstrate 3/3 back precautions during ADL tasks without cues within 7 days. Outcome: Resolved/Met   OCCUPATIONAL THERAPY TREATMENT/DISCHARGE  Patient: Victor Manuel Thakur (6 y.o. female)  Date: 2/28/2021  Diagnosis: Spinal stenosis, lumbar [M48.061] <principal problem not specified>  Procedure(s) (LRB):  REVISION LAMINECTOMY L3-4 WITH REVISION FUSION L3-S1 (N/A) 4 Days Post-Op  Precautions: Back, Fall  Chart, occupational therapy assessment, plan of care, and goals were reviewed. ASSESSMENT  Patient continues with skilled OT services and has progressed towards goals. Patient verbalizing great insight into all spinal precautions and is largely independent-MOD I for ADL transfers and tasks at this time. Patient educated on BUE exercises and piriformis stretch for strengthening core/spine. Patient safe to discharge home with no further OT needs at this time.      Current Level of Function (ADLs/self-care): independent-MOD I    Other factors to consider for discharge: patient with prior back surgeries         PLAN :  Rationale for discharge: Goals achieved  Recommend with staff: OOB x 3 to chair with LSO brace  Recommendation for discharge: (in order for the patient to meet his/her long term goals)  No skilled occupational therapy/ follow up rehabilitation needs identified at this time. This discharge recommendation:  Has been made in collaboration with the attending provider and/or case management    IF patient discharges home will need the following DME:none       SUBJECTIVE:   Patient stated You can't miss it, it's so big (regarding her back brace).     OBJECTIVE DATA SUMMARY:   Cognitive/Behavioral Status:  Neurologic State: Alert  Orientation Level: Oriented X4  Cognition: Appropriate for age attention/concentration  Perception: Appears intact  Perseveration: No perseveration noted  Safety/Judgement: Insight into deficits    Functional Mobility and Transfers for ADLs:  Bed Mobility:  Rolling: Modified independent  Supine to Sit: (received up in chair)  Sit to Supine: Modified independent    Transfers:  Sit to Stand: Supervision  Functional Transfers  Toilet Transfer : Independent       Balance:  Sitting: Intact  Standing: Intact; With support  Standing - Static: Constant support  Standing - Dynamic : Constant support    ADL Intervention:                      Upper Body Dressing Assistance  Orthotics(Brace): Set-up    Lower Body Dressing Assistance  Socks: Independent         Cognitive Retraining  Safety/Judgement: Insight into deficits    Therapeutic Exercises:   Patient completed 1 set x 5 reps active shoulder flexion and shoulder abduction with core muscles contracted to protect spine. Patient instructed to progress reps/weight up to 5 lbs post discharge. Patient educated on piriformis stretch to relieve pressure off of spinal nerves. Pain:  None reported    Activity Tolerance:   Good    After treatment patient left in no apparent distress:   Sitting in chair and Call bell within reach    COMMUNICATION/COLLABORATION:   The patients plan of care was discussed with: Physical therapist and Registered nurse.      Paul Raymundo  Time Calculation: 23 mins

## 2021-02-28 NOTE — PROGRESS NOTES
Orthopedic Spine Progress Note  Post Op day: 4 Days Post-Op    2021 8:55 AM   Admit Date: 2021  Procedure: Procedure(s):  REVISION LAMINECTOMY L3-4 WITH REVISION FUSION L3-S1    Subjective:     Abhilash Cowan has no complaints. Her pain is well controlled. Continues to ambulate with PT, feels ready for discharge today. Denies extremity N/T/W. Tolerating diet. Pain Control:   Pain Assessment  Pain Scale 1: Numeric (0 - 10)  Pain Intensity 1: 5  Pain Onset 1: postopt  Pain Location 1: Back  Pain Orientation 1: Lower  Pain Description 1: Throbbing  Pain Intervention(s) 1: Medication (see MAR)    Objective:          Physical Exam:  General:  Alert and oriented. No acute distress. Heart:  Respirations unlabored. Abdomen:   Extremities: Soft, non-tender. No evidence of cyanosis. Pulses palpable in both upper and lower extremities. Neurologic:  Musculoskeletal:  No new motor deficits. Neurovascular exam within normal limits. Sensation stable. Motor: unchanged C5-T1 and L2-S1. Josefa's sign negative in bilateral lower extremities. Calves soft, nontender upon palpation and with passive twitch. Moves both upper and lower extremities. Incision: clean, dry, and intact. No significant erythema or swelling. No active drainage noted. Vital Signs:    Blood pressure (!) 114/59, pulse 82, temperature 98.7 °F (37.1 °C), resp. rate 16, height 5' (1.524 m), weight 46.8 kg (103 lb 2.8 oz), SpO2 96 %.   Temp (24hrs), Av.3 °F (36.8 °C), Min:97.7 °F (36.5 °C), Max:98.7 °F (37.1 °C)      LAB:    Recent Labs     21  0146   HGB 9.1*     Lab Results   Component Value Date/Time    Sodium 140 2021 04:08 AM    Potassium 4.1 2021 04:08 AM    Chloride 112 (H) 2021 04:08 AM    CO2 23 2021 04:08 AM    Glucose 83 2021 04:08 AM    BUN 19 2021 04:08 AM    Creatinine 0.84 2021 04:08 AM    Calcium 8.0 (L) 2021 04:08 AM       Intake/Output:No intake/output data recorded. No intake/output data recorded. Assessment:   Patient is 4 Days Post-Op s/p Procedure(s):  REVISION LAMINECTOMY L3-4 WITH REVISION FUSION L3-S1    Plan:     1. Continue PT/OT  2. Continue established methods of pain control  3. VTE Prophylaxes - TEDS &/or SCDs   4.  Hgb improved  5. Regular diet  6. Home health consult.    7.  Nausea control    Dispo: home with home health once cleared by PT, likely today      Signed By: Dmitriy Hayes MD

## 2021-03-01 NOTE — PROGRESS NOTES
Orthopedic Spine Discharge Note      Patient Name: Chandler Smith    : 1955         MRN: [id number]    Admit Diagnosis: Spinal stenosis, lumbar [M48.061]    Surgery: Procedure(s):  REVISION LAMINECTOMY L3-4 WITH REVISION FUSION L3-S1          Discharge Disposition: home     Discharge Condition: Good     Activity at Discharge: Activity as tolerated and See surgical instructions      Discharge Paperwork Signed & Completed      [x] yes      [] no    Prescriptions Given to Patient      [x] yes      [] no    Supplies Given to Patient      [x] yes      [] no    Dressing Change/ Wound Care Teaching      [x] yes      [] no    Care Plans Completed      [x] yes      [] no    IV Lines Removed      [x] yes      [] no        ============  ONLY FOR SURGICAL PATIENTS BELOW ============    Cervical Spine D/C Video: Cervical Spine Discharge Video Link     Laminectomy D/C Video: Laminectomy Discharge Video Link    Spinal Fusion D/C Video: Spinal Fusion Discharge Video Link     For Cervical Spine, Spinal Fusion & Laminectomy Surgery Patients Only:     Discharge Video Viewed by Patient and/or Family              [x] yes             [] no             Discharging RN, Odin Christy, has explained the discharge paperwork to the patient and/or family, completed patient and/or family teaching with patient and/or family teach back, and has answered any and all patient and/or family questions before discharge.     Signed by: Odin Christy                                            2021 at 7:24 PM

## 2021-03-09 NOTE — DISCHARGE SUMMARY
Procedure(s):  REVISION LAMINECTOMY L3-4 WITH REVISION FUSION L3-S1 Operative Report      Date of Surgery: 2/24/2021     Preoperative Diagnosis:  SPONDYLOLISTHESIS/STENOSIS/STATUS POST FUSION    Postoperative Diagnosis: SPONDYLOLISTHESIS/STENOSIS/STATUS POST FUSION    Procedure: Procedure(s):  REVISION LAMINECTOMY L3-4 WITH REVISION FUSION L3-S1     Surgeon: Mishel Sinclair MD    History and Hospital Course:  Georgette Tafoya is a pleasant 60y.o. year old female who has complaints of low back and bilateral leg pain. Diagnostic testing found junctional stenosis. Having failed conservative treatment, the patient elected to undergo operative intervention. She tolerated the procedure well and was admitted post-operatively for antibiotics and pain control. On post-op day 1, the patient was doing well. She had some complaints of lower back pain but no leg pain. She was started on a clear liquid diet. She was allowed to dangle on the edge of the bed with physical therapy. PCA was continued. IV antibiotics were continued. On post-op day 2, the patient continued to progress well. She was started on a regular diet. The PCA was discontinued and the patient was started on oral pain medications. She was allowed to started getting out of bed with physical therapy. On post-op day 4, the patient was deemed ready for discharge. She was discharged to home. She was tolerating a regular diet and pain was well controlled with oral pain medications. The patient was discharged with prescriptions for pain medications. She was instructed to wear her brace at all times when out of bed. She was instructed to limit her bending, lifting and twisting. The patient will follow-up in 10-14 days for repeat x-rays and a wound check.       Signed By: Mishel Sinclair MD

## 2021-03-23 ENCOUNTER — TRANSCRIBE ORDER (OUTPATIENT)
Dept: SCHEDULING | Age: 66
End: 2021-03-23

## 2021-03-23 ENCOUNTER — HOSPITAL ENCOUNTER (OUTPATIENT)
Dept: MRI IMAGING | Age: 66
Discharge: HOME | End: 2021-03-23
Attending: PHYSICIAN ASSISTANT
Payer: COMMERCIAL

## 2021-03-23 VITALS — BODY MASS INDEX: 19.33 KG/M2 | WEIGHT: 99 LBS

## 2021-03-23 DIAGNOSIS — Z98.1 S/P LUMBAR FUSION: ICD-10-CM

## 2021-03-23 DIAGNOSIS — Z98.1 S/P LUMBAR FUSION: Primary | ICD-10-CM

## 2021-03-23 PROCEDURE — 74011250636 HC RX REV CODE- 250/636: Performed by: PHYSICIAN ASSISTANT

## 2021-03-23 PROCEDURE — A9575 INJ GADOTERATE MEGLUMI 0.1ML: HCPCS | Performed by: PHYSICIAN ASSISTANT

## 2021-03-23 PROCEDURE — 72158 MRI LUMBAR SPINE W/O & W/DYE: CPT

## 2021-03-23 RX ORDER — GADOTERATE MEGLUMINE 376.9 MG/ML
9 INJECTION INTRAVENOUS
Status: COMPLETED | OUTPATIENT
Start: 2021-03-23 | End: 2021-03-23

## 2021-03-23 RX ADMIN — GADOTERATE MEGLUMINE 9 ML: 376.9 INJECTION INTRAVENOUS at 15:26

## 2021-03-26 ENCOUNTER — TRANSCRIBE ORDER (OUTPATIENT)
Dept: SCHEDULING | Age: 66
End: 2021-03-26

## 2021-03-26 DIAGNOSIS — Z98.1 S/P LUMBAR FUSION: Primary | ICD-10-CM

## 2021-03-30 ENCOUNTER — HOSPITAL ENCOUNTER (OUTPATIENT)
Dept: CT IMAGING | Age: 66
Discharge: HOME | End: 2021-03-30
Attending: PHYSICIAN ASSISTANT
Payer: COMMERCIAL

## 2021-03-30 DIAGNOSIS — Z98.1 S/P LUMBAR FUSION: ICD-10-CM

## 2021-03-30 PROCEDURE — 72131 CT LUMBAR SPINE W/O DYE: CPT

## 2021-10-18 ENCOUNTER — TRANSCRIBE ORDER (OUTPATIENT)
Dept: SCHEDULING | Age: 66
End: 2021-10-18

## 2021-10-18 DIAGNOSIS — Z98.1 S/P LUMBAR FUSION: Primary | ICD-10-CM

## 2021-10-18 DIAGNOSIS — M48.062 LUMBAR STENOSIS WITH NEUROGENIC CLAUDICATION: ICD-10-CM

## 2021-11-09 ENCOUNTER — HOSPITAL ENCOUNTER (OUTPATIENT)
Dept: CT IMAGING | Age: 66
Discharge: HOME | End: 2021-11-09
Attending: PHYSICIAN ASSISTANT
Payer: COMMERCIAL

## 2021-11-09 DIAGNOSIS — Z98.1 S/P LUMBAR FUSION: ICD-10-CM

## 2021-11-09 DIAGNOSIS — M48.062 LUMBAR STENOSIS WITH NEUROGENIC CLAUDICATION: ICD-10-CM

## 2021-11-09 PROCEDURE — 72131 CT LUMBAR SPINE W/O DYE: CPT

## 2021-12-20 RX ORDER — CYCLOBENZAPRINE HCL 10 MG
TABLET ORAL
Qty: 40 TABLET | Refills: 0 | Status: SHIPPED | OUTPATIENT
Start: 2021-12-20 | End: 2022-01-04

## 2022-01-04 RX ORDER — CYCLOBENZAPRINE HCL 10 MG
TABLET ORAL
Qty: 60 TABLET | Refills: 0 | Status: SHIPPED | OUTPATIENT
Start: 2022-01-04 | End: 2022-02-03

## 2022-02-03 RX ORDER — CYCLOBENZAPRINE HCL 10 MG
TABLET ORAL
Qty: 60 TABLET | Refills: 0 | Status: SHIPPED | OUTPATIENT
Start: 2022-02-03 | End: 2022-02-24

## 2022-02-24 RX ORDER — CYCLOBENZAPRINE HCL 10 MG
TABLET ORAL
Qty: 60 TABLET | Refills: 0 | Status: SHIPPED | OUTPATIENT
Start: 2022-02-24 | End: 2022-03-16

## 2022-03-10 ENCOUNTER — OFFICE VISIT (OUTPATIENT)
Dept: ORTHOPEDIC SURGERY | Age: 67
End: 2022-03-10
Payer: COMMERCIAL

## 2022-03-10 VITALS — BODY MASS INDEX: 19.63 KG/M2 | HEIGHT: 60 IN | WEIGHT: 100 LBS

## 2022-03-10 DIAGNOSIS — Z98.1 S/P LUMBAR FUSION: Primary | ICD-10-CM

## 2022-03-10 PROCEDURE — 99213 OFFICE O/P EST LOW 20 MIN: CPT | Performed by: PHYSICIAN ASSISTANT

## 2022-03-10 NOTE — PROGRESS NOTES
1. Have you been to the ER, urgent care clinic since your last visit? Hospitalized since your last visit? No    2. Have you seen or consulted any other health care providers outside of the [address] [place] [address] since your last visit? Include any pap smears or colon screening.  No    Chief Complaint   Patient presents with    Back Pain     (1Year PO) Sx 2/24/2021 REVISION LAMINECTOMY L3-4 WITH REVISION FUSION L3-S1

## 2022-03-14 NOTE — PROGRESS NOTES
Jenelle Cruz (: 1955) is a 73 y.o. female patient here for evaluation of the following chief complaint(s):  Back Pain ((1Year PO) Sx 2021 REVISION LAMINECTOMY L3-4 WITH REVISION FUSION L3-S1 )         ASSESSMENT/PLAN:  Below is the assessment and plan developed based on review of pertinent history, physical exam, labs, studies, and medications. 1. S/P lumbar fusion  -     XR SPINE LUMB 2 OR 3 V; Future      The patient's radiologic findings have been reviewed with her in detail today. She has persistent low back pain with bilateral lower extremity radicular pain. She has normal bilateral lower extremity EMGs. Her CT scan shows incomplete fusion at the L3-4 level, and I would like to discuss this further with Dr. Elenita Ceron. She will continue with current activities, and will continue with hydrocodone as per her neurologist, and may try over-the-counter medications as well. I will contact her with further treatment recommendations per Dr. Elenita Ceron. No follow-ups on file. SUBJECTIVE/OBJECTIVE:  Jenelle Cruz (: 1955) is a 73 y.o. female who presents today for the following:  Chief Complaint   Patient presents with    Back Pain     (1Year PO) Sx 2021 REVISION LAMINECTOMY L3-4 WITH REVISION FUSION L3-S1         HPI   Ms. Pimentel returns today for follow-up after her recent CT scan. She is just over 1 year out from her recent revision posterior spinal fusion. Since her last office visit, she has completed CT scan and the bilateral lower extremity EMG. She continues with constant low back pain with right lateral leg pain with radiation to the anterior aspect of the knee. She has been having some left leg symptoms as well. No weakness in her legs. No changes in bowel or bladder control. She has been using hydrocodone as prescribed by her neurologist for migraine headaches.     IMAGING:  XR Results (most recent):  Results from Appointment encounter on 03/10/22    XR SPINE LUMB 2 OR 3 V    Narrative  AP and lateral films of the lumbar spine reveal a stable posterior spinal fusion from L3-S1 with stable instrumentation. CT Results (most recent): 11/2021   INDICATION:  Evaluate arthrodesis status of lumbar spine      EXAM: CT spine. Comparison 3/30/2021.     Thin section axial images were obtained. From these sagittal and coronal  reformats were performed. CT dose reduction was achieved through use of a  standardized protocol tailored for this examination and automatic exposure  control for dose modulation. 3-D reconstructions were performed by the  technologist     FINDINGS: Patient is post L3-S1 anterior posterior decompression. There is no  hardware loosening. The interbody fusion device at L3 projects 3 mm posterior to  the cortex but unchanged in position. There is no osseous bridging of the L3-4  facets. There is morselized bone graft material posteriorly. There is solid  osseous incorporation of the L4-5 facet fusion. Previous L5-S1 facetectomy. Solid osseous incorporation of the L5-S1 interbody fusion     Bone mineral density is decreased. An acute fracture is not demonstrated. No  interval degeneration of the L1-2 or L2-3 disc levels.     There are vascular calcifications. Visualized bowel is nondistended. .     IMPRESSION  1. No change in appearance of the L3-S1 decompression and fusion. Solid osseous  incorporation of the L4-5 facets and L5-S1 interbody fusion. No osseous bridging  at L3-4       No Known Allergies    Current Outpatient Medications   Medication Sig    cyclobenzaprine (FLEXERIL) 10 mg tablet TAKE 1 TABLET BY MOUTH THREE TIMES A DAY AS NEEDED    naloxone (Narcan) 4 mg/actuation nasal spray Use 1 spray intranasally, then discard. Repeat with new spray every 2 min as needed for opioid overdose symptoms, alternating nostrils.   Indications: decrease in rate & depth of breathing due to opioid drug, opioid overdose    furosemide (LASIX) 20 mg tablet Take 20 mg by mouth daily.    SUMAtriptan (IMITREX) 100 mg tablet Take 100 mg by mouth once as needed for Migraine.  erenumab-aooe (Aimovig Autoinjector, 2 Pack,) 70 mg/mL injection 140 mg by SubCUTAneous route every twenty-eight (28) days. LAST DOSE: 2021     No current facility-administered medications for this visit. Past Medical History:   Diagnosis Date    Arthritis     Other ill-defined conditions(799.89)     MIGRAINES        Past Surgical History:   Procedure Laterality Date    HX GI      COLONOSCOPY MULTIPLE.  HX GI      ENDOSCOPY    HX GYN       X 1    HX HYSTERECTOMY      HX ORTHOPAEDIC      Spinal fusion at L4-L5    HX OTHER SURGICAL      DIAGNOSTIC EXPLORATORY SURGERIES MULTIPLE.  HX WISDOM TEETH EXTRACTION       X 4    IR INJ FORAMIN EPID LUMB ANES/STER SNGL  12/3/2020    NEUROLOGICAL PROCEDURE UNLISTED      LUMBAR FUSION WITH RODS & SCREWS       Family History   Problem Relation Age of Onset    Cancer Mother         COLON CA    Other Neg Hx         UNKNOWN FAMILY HX        Social History     Tobacco Use    Smoking status: Never Smoker    Smokeless tobacco: Never Used   Substance Use Topics    Alcohol use: No        Review of Systems   Constitutional: Negative. Respiratory: Negative. Cardiovascular: Negative. Gastrointestinal: Negative. Endocrine: Negative. Genitourinary: Negative. Musculoskeletal: Positive for back pain. Skin: Negative. Allergic/Immunologic: Negative. Hematological: Negative. Psychiatric/Behavioral: Negative. All other systems reviewed and are negative. No flowsheet data found. Vitals:  Ht 5' (1.524 m)   Wt 100 lb (45.4 kg)   BMI 19.53 kg/m²    Body mass index is 19.53 kg/m². Physical Exam    Neurologic  Sensory  Light Touch - Intact - Globally. Overall Assessment of Muscle Strength and Tone reveals  Lower Extremities - Right Iliopsoas - 5/5. Left Iliopsoas - 5/5. Right Tibialis Anterior - 5/5.  Left Tibialis Anterior - 5/5. Right Gastroc-Soleus - 5/5. Left Gastroc-Soleus - 5/5. Right EHL - 5/5. Left EHL - 5/5. General Assessment of Reflexes  Right Ankle - Clonus is not present. Left Ankle - Clonus is not present. Reflexes (Dermatomes)  2/2 Normal - Left Achilles (L5-S2), Left Knee (L2-4), Right Achilles (L5-S2) and Right Knee (L2-4). Musculoskeletal  Global Assessment  Examination of related systems reveals - well-developed, well-nourished, in no acute distress, alert and oriented x 3. Gait and Station - normal gait and station and normal posture. Right Lower Extremity - normal strength and tone, normal range of motion without pain and no instability, subluxation or laxity. Left Lower Extremity - normal strength and tone, normal range of motion without pain and no instability, subluxation or laxity. Spine/Ribs/Pelvis  Cervical Spine - Examination of the cervical spine reveals - no tenderness to palpation, no pain, no swelling, edema or erythema, normal cervical spine movements and normal sensation. Thoracic (Dorsal) Spine - Examination of the thoracic spine reveals - no tenderness over thoracic vertebrae, no pain, normal sensation and normal thoracic spine movements. Lumbosacral Spine - Examination of the lumbosacral spine reveals - no known fractures or deformities. Inspection and Palpation - Tenderness - moderate. Assessment of pain reveals the following findings - The pain is characterized as - moderate. Location - pain refers to lower back bilaterally. ROJM - Trunk Extension - 15 degrees. Lumbar Spine Flexion - 35 °. Lumbosacral Spine - Functional Testing - Babinski Test negative, Prone Knee Bending Test negative, Slump Test negative, Straight Leg Raising Test negative. Dr. James Sanchez was available for immediate consult during this encounter. An electronic signature was used to authenticate this note.   -- CHERELLE Glass

## 2022-03-16 RX ORDER — CYCLOBENZAPRINE HCL 10 MG
TABLET ORAL
Qty: 60 TABLET | Refills: 0 | Status: SHIPPED | OUTPATIENT
Start: 2022-03-16 | End: 2022-04-04

## 2022-03-18 PROBLEM — M48.061 SPINAL STENOSIS, LUMBAR: Status: ACTIVE | Noted: 2021-02-24

## 2022-03-29 ENCOUNTER — TELEPHONE (OUTPATIENT)
Dept: ORTHOPEDIC SURGERY | Age: 67
End: 2022-03-29

## 2022-03-29 DIAGNOSIS — Z98.1 S/P LUMBAR FUSION: Primary | ICD-10-CM

## 2022-03-29 NOTE — TELEPHONE ENCOUNTER
[address] [place] [address]  Reports that her symptoms are the same just worsening. States the left side is worse than the right side. She is asking for the plan of care. The patient's radiologic findings have been reviewed with her in detail today. She has persistent low back pain with bilateral lower extremity radicular pain. She has normal bilateral lower extremity EMGs. Her CT scan shows incomplete fusion at the L3-4 level, and I would like to discuss this further with Dr. Kaela Brumfield. She will continue with current activities, and will continue with hydrocodone as per her neurologist, and may try over-the-counter medications as well. I will contact her with further treatment recommendations per Dr. Kaela Brumfield.

## 2022-03-31 NOTE — TELEPHONE ENCOUNTER
Spoke with pt regarding her recent CT results. Per discussion with Dr. Rosalba Yepez, her fusion anteriorly at L3-4 is solid and remaining posterior elements are expected to fuse over time. No specific surgical intervention at this time. We have discussed treatment options to include referral for eval and treat to pain management. She would like to proceed with this. Other options in the future could include DCS trial or extension of fusion, which she would like to hold off on for now. I have asked her to return for a follow up visit in 2-3 months.

## 2022-04-04 RX ORDER — CYCLOBENZAPRINE HCL 10 MG
TABLET ORAL
Qty: 60 TABLET | Refills: 0 | Status: SHIPPED | OUTPATIENT
Start: 2022-04-04 | End: 2022-04-20

## 2022-04-20 RX ORDER — CYCLOBENZAPRINE HCL 10 MG
TABLET ORAL
Qty: 60 TABLET | Refills: 0 | Status: SHIPPED | OUTPATIENT
Start: 2022-04-20 | End: 2022-05-16

## 2022-05-16 RX ORDER — CYCLOBENZAPRINE HCL 10 MG
TABLET ORAL
Qty: 60 TABLET | Refills: 0 | Status: SHIPPED | OUTPATIENT
Start: 2022-05-16 | End: 2022-06-08

## 2022-06-08 RX ORDER — CYCLOBENZAPRINE HCL 10 MG
TABLET ORAL
Qty: 60 TABLET | Refills: 0 | Status: SHIPPED | OUTPATIENT
Start: 2022-06-08 | End: 2022-06-30

## 2022-06-30 RX ORDER — CYCLOBENZAPRINE HCL 10 MG
TABLET ORAL
Qty: 60 TABLET | Refills: 0 | Status: SHIPPED | OUTPATIENT
Start: 2022-06-30 | End: 2022-08-01

## 2022-07-18 ENCOUNTER — OFFICE VISIT (OUTPATIENT)
Dept: ORTHOPEDIC SURGERY | Age: 67
End: 2022-07-18
Payer: COMMERCIAL

## 2022-07-18 VITALS — WEIGHT: 100 LBS | BODY MASS INDEX: 19.63 KG/M2 | HEIGHT: 60 IN

## 2022-07-18 DIAGNOSIS — M54.2 ACUTE NECK PAIN: ICD-10-CM

## 2022-07-18 DIAGNOSIS — M54.41 CHRONIC BILATERAL LOW BACK PAIN WITH BILATERAL SCIATICA: ICD-10-CM

## 2022-07-18 DIAGNOSIS — G89.29 CHRONIC BILATERAL LOW BACK PAIN WITH BILATERAL SCIATICA: ICD-10-CM

## 2022-07-18 DIAGNOSIS — Z98.1 S/P LUMBAR FUSION: Primary | ICD-10-CM

## 2022-07-18 DIAGNOSIS — M54.42 CHRONIC BILATERAL LOW BACK PAIN WITH BILATERAL SCIATICA: ICD-10-CM

## 2022-07-18 PROCEDURE — 99213 OFFICE O/P EST LOW 20 MIN: CPT | Performed by: PHYSICIAN ASSISTANT

## 2022-07-18 PROCEDURE — 1123F ACP DISCUSS/DSCN MKR DOCD: CPT | Performed by: PHYSICIAN ASSISTANT

## 2022-07-18 NOTE — PROGRESS NOTES
Dulce Omalley (: 1955) is a 73 y.o. female patient here for evaluation of the following chief complaint(s):  Back Pain (Sx 21 L3/S1 Revision Fusion)         ASSESSMENT/PLAN:  Below is the assessment and plan developed based on review of pertinent history, physical exam, labs, studies, and medications. 1. S/P lumbar fusion  -     XR SPINE LUMB 2 OR 3 V; Future  2. Chronic bilateral low back pain with bilateral sciatica  3. Acute neck pain  -     REFERRAL TO PHYSICAL THERAPY; Future    The patient's radiologic findings have been reviewed with her again in detail today. He has had a single round of epidural injections with pain management that provided her with temporary relief. She does have a follow-up visit scheduled next week. Unfortunately, she feels that her symptoms continue to worsen, and she is now experiencing left greater than right leg pain. We have discussed various treatment options, and I would like for her to continue with pain management for continued injections. She may also discuss further with pain management the potential need for dorsal column stimulator trial.  I have asked her to return for a follow-up visit in 3 to 4 months with Dr. Emily Samayoa for possible surgical consultation. Continue with over-the-counter medications in the interim. She also mentions a more recent onset of neck pain. She has been given an order for outpatient physical therapy for her neck symptoms, and may return for a follow-up visit in 6 to 8 weeks. Will need formal x-rays of her cervical spine at that time and continue with a separate evaluation for her neck symptoms as well. Return in about 3 months (around 10/18/2022) for With Dr. Emily Samayoa, For surgical consultation. SUBJECTIVE/OBJECTIVE:  Dulce Omalley (: 1955) is a 73 y.o. female who presents today for the following:  Chief Complaint   Patient presents with    Back Pain     Sx 21 L3/S1 Revision Fusion        HPI   Ms. Pimentel presents today for a routine follow-up visit. She is over 1 year out from her revision posterior spinal fusion. She states that since her last office visit, she had a single round of epidural steroid injections with Dr. Chuy Thrasher from approximately 6 weeks ago. She had minimal overall temporary relief and does have follow-up appointment within the next couple of weeks. Her history as noted below. She continues with constant low back pain with right lateral leg pain with radiation to the anterior aspect of the knee. She has been having now progressively worsening left leg symptoms as well. No weakness in her legs. No changes in bowel or bladder control. She has been using hydrocodone as prescribed by her neurologist for migraine headaches. IMAGING:  XR Results (most recent):  Results from Appointment encounter on 07/18/22    XR SPINE LUMB 2 OR 3 V    Narrative  PA and lateral films of the lumbar spine reveal a stable posterior lumbar fusion from L3-S1 with stable instrumentation. CT Results (most recent): 11/2021   INDICATION:  Evaluate arthrodesis status of lumbar spine      EXAM: CT spine. Comparison 3/30/2021.     Thin section axial images were obtained. From these sagittal and coronal  reformats were performed. CT dose reduction was achieved through use of a  standardized protocol tailored for this examination and automatic exposure  control for dose modulation. 3-D reconstructions were performed by the  technologist     FINDINGS: Patient is post L3-S1 anterior posterior decompression. There is no  hardware loosening. The interbody fusion device at L3 projects 3 mm posterior to  the cortex but unchanged in position. There is no osseous bridging of the L3-4  facets. There is morselized bone graft material posteriorly. There is solid  osseous incorporation of the L4-5 facet fusion. Previous L5-S1 facetectomy.   Solid osseous incorporation of the L5-S1 interbody fusion     Bone mineral density is decreased. An acute fracture is not demonstrated. No  interval degeneration of the L1-2 or L2-3 disc levels.     There are vascular calcifications. Visualized bowel is nondistended. .     IMPRESSION  1. No change in appearance of the L3-S1 decompression and fusion. Solid osseous  incorporation of the L4-5 facets and L5-S1 interbody fusion. No osseous bridging  at L3-4    No Known Allergies    Current Outpatient Medications   Medication Sig    cyclobenzaprine (FLEXERIL) 10 mg tablet TAKE 1 TABLET BY MOUTH THREE TIMES A DAY AS NEEDED    naloxone (Narcan) 4 mg/actuation nasal spray Use 1 spray intranasally, then discard. Repeat with new spray every 2 min as needed for opioid overdose symptoms, alternating nostrils. Indications: decrease in rate & depth of breathing due to opioid drug, opioid overdose    furosemide (LASIX) 20 mg tablet Take 20 mg by mouth daily.  SUMAtriptan (IMITREX) 100 mg tablet Take 100 mg by mouth once as needed for Migraine.  erenumab-aooe (Aimovig Autoinjector, 2 Pack,) 70 mg/mL injection 140 mg by SubCUTAneous route every twenty-eight (28) days. LAST DOSE: 2021     No current facility-administered medications for this visit. Past Medical History:   Diagnosis Date    Arthritis     Other ill-defined conditions(799.89)     MIGRAINES        Past Surgical History:   Procedure Laterality Date    HX GI      COLONOSCOPY MULTIPLE.  HX GI      ENDOSCOPY    HX GYN       X 1    HX HYSTERECTOMY      HX ORTHOPAEDIC      Spinal fusion at L4-L5    HX OTHER SURGICAL      DIAGNOSTIC EXPLORATORY SURGERIES MULTIPLE.     HX WISDOM TEETH EXTRACTION       X 4    IR INJ FORAMIN EPID LUMB ANES/STER SNGL  12/3/2020    NEUROLOGICAL PROCEDURE UNLISTED      LUMBAR FUSION WITH RODS & SCREWS       Family History   Problem Relation Age of Onset    Cancer Mother         COLON CA    Other Neg Hx         UNKNOWN FAMILY HX        Social History     Tobacco Use    Smoking status: Never Smoker    Smokeless tobacco: Never Used   Substance Use Topics    Alcohol use: No        Review of Systems   Constitutional: Negative. Respiratory: Negative. Cardiovascular: Negative. Gastrointestinal: Negative. Endocrine: Negative. Genitourinary: Negative. Musculoskeletal: Positive for back pain and gait problem. Skin: Negative. Allergic/Immunologic: Negative. Hematological: Negative. Psychiatric/Behavioral: Negative. All other systems reviewed and are negative. No flowsheet data found. Vitals:  Ht 5' (1.524 m)   Wt 100 lb (45.4 kg)   BMI 19.53 kg/m²    Body mass index is 19.53 kg/m². Physical Exam    Neurologic  Sensory  Light Touch - Intact - Globally. Overall Assessment of Muscle Strength and Tone reveals  Lower Extremities - Right Iliopsoas - 5/5. Left Iliopsoas - 5/5. Right Tibialis Anterior - 5/5. Left Tibialis Anterior - 5/5. Right Gastroc-Soleus - 5/5. Left Gastroc-Soleus - 5/5. Right EHL - 5/5. Left EHL - 5/5. General Assessment of Reflexes  Right Ankle - Clonus is not present. Left Ankle - Clonus is not present. Reflexes (Dermatomes)  2/2 Normal - Left Achilles (L5-S2), Left Knee (L2-4), Right Achilles (L5-S2) and Right Knee (L2-4). Musculoskeletal  Global Assessment  Examination of related systems reveals - well-developed, well-nourished, in no acute distress, alert and oriented x 3. Gait and Station - normal gait and station and normal posture. Right Lower Extremity - normal strength and tone, normal range of motion without pain and no instability, subluxation or laxity. Left Lower Extremity - normal strength and tone, normal range of motion without pain and no instability, subluxation or laxity. Spine/Ribs/Pelvis  Cervical Spine - Examination of the cervical spine reveals - no tenderness to palpation, no pain, no swelling, edema or erythema, normal cervical spine movements and normal sensation.  Thoracic (Dorsal) Spine - Examination of the thoracic spine reveals - no tenderness over thoracic vertebrae, no pain, normal sensation and normal thoracic spine movements. Lumbosacral Spine - Examination of the lumbosacral spine reveals - no known fractures or deformities. Inspection and Palpation - Tenderness - moderate. Assessment of pain reveals the following findings - The pain is characterized as - moderate. Location - pain refers to lower back bilaterally. ROJM - Trunk Extension - 15 degrees. Lumbar Spine Flexion - 35 °. Lumbosacral Spine - Functional Testing - Babinski Test negative, Prone Knee Bending Test negative, Slump Test negative, Straight Leg Raising Test negative. Dr. Jonnie Johnson was available for immediate consult during this encounter. An electronic signature was used to authenticate this note.   -- Charmayne Sender, PA

## 2022-07-18 NOTE — PROGRESS NOTES
1. Have you been to the ER, urgent care clinic since your last visit? Hospitalized since your last visit? No    2. Have you seen or consulted any other health care providers outside of the [address] [place] [address] since your last visit? Include any pap smears or colon screening.  No    Chief Complaint   Patient presents with    Back Pain     Sx 2/24/21 L3/S1 Revision Fusion

## 2022-08-01 RX ORDER — CYCLOBENZAPRINE HCL 10 MG
TABLET ORAL
Qty: 60 TABLET | Refills: 0 | Status: SHIPPED | OUTPATIENT
Start: 2022-08-01 | End: 2022-08-30

## 2022-08-30 RX ORDER — CYCLOBENZAPRINE HCL 10 MG
TABLET ORAL
Qty: 60 TABLET | Refills: 0 | Status: SHIPPED | OUTPATIENT
Start: 2022-08-30 | End: 2022-10-24

## 2022-09-07 ENCOUNTER — OFFICE VISIT (OUTPATIENT)
Dept: ORTHOPEDIC SURGERY | Age: 67
End: 2022-09-07
Payer: COMMERCIAL

## 2022-09-07 VITALS — BODY MASS INDEX: 19.63 KG/M2 | HEIGHT: 60 IN | WEIGHT: 100 LBS

## 2022-09-07 DIAGNOSIS — M54.2 NECK PAIN: Primary | ICD-10-CM

## 2022-09-07 DIAGNOSIS — M50.30 DEGENERATIVE CERVICAL DISC: ICD-10-CM

## 2022-09-07 DIAGNOSIS — M48.02 CERVICAL STENOSIS OF SPINE: ICD-10-CM

## 2022-09-07 PROCEDURE — 1123F ACP DISCUSS/DSCN MKR DOCD: CPT | Performed by: PHYSICIAN ASSISTANT

## 2022-09-07 PROCEDURE — 99213 OFFICE O/P EST LOW 20 MIN: CPT | Performed by: PHYSICIAN ASSISTANT

## 2022-09-07 RX ORDER — METHYLPREDNISOLONE 4 MG/1
TABLET ORAL
Qty: 1 DOSE PACK | Refills: 0 | Status: SHIPPED | OUTPATIENT
Start: 2022-09-07 | End: 2022-11-01

## 2022-09-07 NOTE — PROGRESS NOTES
Claudetta Cannon (: 1955) is a 78 y.o. female patient here for evaluation of the following chief complaint(s):  Neck Pain         ASSESSMENT/PLAN:  Below is the assessment and plan developed based on review of pertinent history, physical exam, labs, studies, and medications. 1. Neck pain  -     XR SPINE CERV 4 OR 5 V; Future  -     MRI CERV SPINE WO CONT; Future  2. Degenerative cervical disc  -     MRI CERV SPINE WO CONT; Future  3. Cervical stenosis of spine  -     MRI CERV SPINE WO CONT; Future      Patient's radiologic findings have been reviewed with her in detail today. She has a 4 to 5-month history of neck pain that has worsened since her last office visit despite conservative management. She has completed 2 visits of outpatient physical therapy, and had to stop due to the severity of her pain. I would like for her to obtain an MRI of the cervical spine for further evaluation of compressive pathology. She will hold Naprosyn and will try a Medrol pack for pain relief. She may continue with Flexeril. He would like to be considered for injection therapy for her cervical spine if appropriate, and will call back after her MRI is complete for results and possible injection planning. No follow-ups on file. SUBJECTIVE/OBJECTIVE:  Claudetta Cannon (: 1955) is a 78 y.o. female who presents today for the following:  Chief Complaint   Patient presents with    Neck Pain        Neck Pain     Ms. Pimentel turns today for follow-up of her neck. Since her last office visit, she has had 2 visits of outpatient physical therapy. Unfortunately her pain has increased in severity and is constant in nature. Her visit is actually worsened her pain. She has had posterior neck pain for the past 4 to 5 months with new radiation of pain in the right periscapular region. She denies any arm pain, numbness, tingling, or weakness in the arms. No difficulty with balance, falls, or dropping objects.   She is having significant spasms in the limited range of motion of her neck that has been limiting her daily activities. She is using Flexeril and Naprosyn currently. IMAGING:  XR Results (most recent):  Results from Appointment encounter on 22    XR SPINE CERV 4 OR 5 V    Narrative  AP, lateral, flexion, and extension films of the cervical spine reveal no evidence of acute fracture or lytic lesion. There is a loss of normal cervical lordosis. There is a minimal anterolisthesis noted at C4-5 without movement with flexion or extension views. There is moderate to severe disc degeneration and spondylosis particularly at C5-6 and C6-7. MRI Results (most recent):       No Known Allergies    Current Outpatient Medications   Medication Sig    methylPREDNISolone (MEDROL DOSEPACK) 4 mg tablet Per dose pack instructions    cyclobenzaprine (FLEXERIL) 10 mg tablet TAKE 1 TABLET BY MOUTH THREE TIMES A DAY AS NEEDED    naloxone (Narcan) 4 mg/actuation nasal spray Use 1 spray intranasally, then discard. Repeat with new spray every 2 min as needed for opioid overdose symptoms, alternating nostrils. Indications: decrease in rate & depth of breathing due to opioid drug, opioid overdose    furosemide (LASIX) 20 mg tablet Take 20 mg by mouth daily. SUMAtriptan (IMITREX) 100 mg tablet Take 100 mg by mouth once as needed for Migraine. erenumab-aooe (Aimovig Autoinjector, 2 Pack,) 70 mg/mL injection 140 mg by SubCUTAneous route every twenty-eight (28) days. LAST DOSE: 2021     No current facility-administered medications for this visit. Past Medical History:   Diagnosis Date    Arthritis     Other ill-defined conditions(799.89)     MIGRAINES        Past Surgical History:   Procedure Laterality Date    HX GI      COLONOSCOPY MULTIPLE.     HX GI      ENDOSCOPY    HX GYN       X 1    HX HYSTERECTOMY      HX ORTHOPAEDIC      Spinal fusion at L4-L5    HX OTHER SURGICAL      DIAGNOSTIC EXPLORATORY SURGERIES MULTIPLE. HX WISDOM TEETH EXTRACTION       X 4    IR INJ FORAMIN EPID LUMB ANES/STER SNGL  12/3/2020    NEUROLOGICAL PROCEDURE UNLISTED      LUMBAR FUSION WITH RODS & SCREWS       Family History   Problem Relation Age of Onset    Cancer Mother         COLON CA    Other Neg Hx         UNKNOWN FAMILY HX        Social History     Tobacco Use    Smoking status: Never    Smokeless tobacco: Never   Substance Use Topics    Alcohol use: No        Review of Systems   Constitutional: Negative. Respiratory: Negative. Cardiovascular: Negative. Gastrointestinal: Negative. Endocrine: Negative. Genitourinary: Negative. Musculoskeletal:  Positive for neck pain and neck stiffness. Skin: Negative. Allergic/Immunologic: Negative. Hematological: Negative. Psychiatric/Behavioral: Negative. All other systems reviewed and are negative. No flowsheet data found. Vitals:  Ht 5' (1.524 m)   Wt 100 lb (45.4 kg)   BMI 19.53 kg/m²    Body mass index is 19.53 kg/m². Physical Exam    Neurologic  Overall Assessment of Muscle Strength and Tone reveals  Upper Extremities - Right Deltoid - 5/5. Left Deltoid - 5/5. Right Bicep - 5/5. Left Bicep - 5/5. Right Tricep - 5/5. Left Tricep - 5/5. Right Wrist Extensors - 5/5. Left Wrist Extensors - 5/5. Right Wrist Flexors - 5/5. Left Wrist Flexors - 5/5. Right Intrinsics - 5/5. Left Intrinsics - 5/5. General Assessment of Reflexes  Right Hand - Gotti's sign is negative in the right hand. Left Hand - Gotti's sign is negative in the left hand. Reflexes (Dermatomes)  2/2 Normal - Left Bicep (C5-6), Left Tricep (C7-8), Left Brachioradialis (C5-6), Right Bicep (C5-6), Right Tricep (C7-8) and Right Brachioradialis (C5-6). Musculoskeletal  Global Assessment  Examination of related systems reveals - well-developed, well-nourished, in no acute distress, alert and oriented x 3 and normal coordination.  Gait and Station - normal gait and station and normal posture. Spine/Ribs/Pelvis  Cervical Spine - Evaluation of related systems reveals - no lymphadenopathy and neurovascularly intact bilaterally. Inspection and Palpation - Tenderness - moderate and localized. Assessment of pain reveals the following findings: - Location - cervical area. ROJM - Flexion - 85 °. Right Lateral Flexion - 35 °. Left Lateral Flexion - 35 °. Extension - 70 °. Right Rotation - 80 °. Left Rotation - 80 °. Cervical Spine - Functional Testing - Foraminal Compression/Spurling's Test negative, Shoulder Depression Test negative, Upper Limb Tension Test negative. Lumbosacral Spine - Examination of the lumbosacral spine reveals - no tenderness to palpation, no pain, normal strength and tone, no laxity or crepitus and normal lumbosacral spine movements. Dr. Jem Jean-Baptiste was available for immediate consult during this encounter. An electronic signature was used to authenticate this note.   -- CHERELLE Benz

## 2022-09-07 NOTE — LETTER
9/7/2022    Patient: Camille Siddiqui   YOB: 1955   Date of Visit: 9/7/2022     Surjit Szymanski MD  [address] [address]  Via Fax: [phone]    Dear Surjit Szymanski MD,      Thank you for referring Ms. Kelvin Yepez to [facility] for evaluation. My notes for this consultation are attached. If you have questions, please do not hesitate to call me. I look forward to following your patient along with you.       Sincerely,    CHERELLE Gomez

## 2022-09-16 ENCOUNTER — TELEPHONE (OUTPATIENT)
Dept: ORTHOPEDIC SURGERY | Age: 67
End: 2022-09-16

## 2022-09-19 NOTE — TELEPHONE ENCOUNTER
Spoke with patient regarding recent MRI of the cervical spine. Her symptoms remain relatively unchanged with posterior neck pain with radiation into the right periscapular region. She has multilevel degenerative changes with mild central and foraminal narrowing. Mild spondylolisthesis noted. She does have moderate right foraminal narrowing at C6-7. We have discussed treatment options, and she would like to be referred for right-sided C6-7 epidural steroid injection with pain management. Please refer her to [facility] for this injection. She will keep her follow-up visit as scheduled with Dr. Nereida Lugo.

## 2022-10-11 ENCOUNTER — OFFICE VISIT (OUTPATIENT)
Dept: ORTHOPEDIC SURGERY | Age: 67
End: 2022-10-11
Payer: COMMERCIAL

## 2022-10-11 VITALS — HEIGHT: 60 IN | WEIGHT: 100 LBS | BODY MASS INDEX: 19.63 KG/M2

## 2022-10-11 DIAGNOSIS — M54.42 CHRONIC BILATERAL LOW BACK PAIN WITH BILATERAL SCIATICA: ICD-10-CM

## 2022-10-11 DIAGNOSIS — Z98.1 S/P LUMBAR FUSION: Primary | ICD-10-CM

## 2022-10-11 DIAGNOSIS — G89.29 CHRONIC BILATERAL LOW BACK PAIN WITH BILATERAL SCIATICA: ICD-10-CM

## 2022-10-11 DIAGNOSIS — M54.41 CHRONIC BILATERAL LOW BACK PAIN WITH BILATERAL SCIATICA: ICD-10-CM

## 2022-10-11 PROCEDURE — 1123F ACP DISCUSS/DSCN MKR DOCD: CPT | Performed by: ORTHOPAEDIC SURGERY

## 2022-10-11 PROCEDURE — 99214 OFFICE O/P EST MOD 30 MIN: CPT | Performed by: ORTHOPAEDIC SURGERY

## 2022-10-11 RX ORDER — TRAMADOL HYDROCHLORIDE 50 MG/1
50 TABLET ORAL
Qty: 40 TABLET | Refills: 0 | Status: SHIPPED | OUTPATIENT
Start: 2022-10-11 | End: 2022-11-10

## 2022-10-11 NOTE — PATIENT INSTRUCTIONS
Lumbar Spinal Fusion: Before Your Surgery  What is lumbar spinal fusion? Spinal fusion is surgery that joins, or fuses, two or more vertebrae together. The joints will no longer be able to move. The surgery is also called arthrodesis. Most of the time, bone from your pelvic bone or from a bone bank is used to make a \"bridge\" between the vertebrae to be joined. Metal rods, wires, or screws are often attached to the vertebrae. This will hold them together until new bone grows between them. Spinal fusion is major surgery. It usually lasts several hours. It involves making a cut in your back or your belly, or sometimes both. The cuts, called incisions, leave scars that fade with time. You can expect your back to feel stiff and sore after surgery. You will be given pain medicine. You will probably get up and walk at the hospital. Akin Carr will be in the hospital for several days. It may take 4 to 6 weeks to get back to doing simple activities, such as light housework. Follow-up care is a key part of your treatment and safety. Be sure to make and go to all appointments, and call your doctor if you are having problems. It's also a good idea to know your test results and keep a list of the medicines you take. How do you prepare for surgery? Surgery can be stressful. This information will help you understand what you can expect. And it will help you safely prepare for surgery. Preparing for surgery    Be sure you have someone to take you home. Anesthesia and pain medicine will make it unsafe for you to drive or get home on your own. Understand exactly what surgery is planned, along with the risks, benefits, and other options. If you take aspirin or some other blood thinner, ask your doctor if you should stop taking it before your surgery. Make sure that you understand exactly what your doctor wants you to do. These medicines increase the risk of bleeding.      Tell your doctor ALL the medicines, vitamins, supplements, and herbal remedies you take. Some may increase the risk of problems during your surgery. Your doctor will tell you if you should stop taking any of them before the surgery and how soon to do it. Make sure your doctor and the hospital have a copy of your advance directive. If you don't have one, you may want to prepare one. It lets others know your health care wishes. It's a good thing to have before any type of surgery or procedure. What happens on the day of surgery? Follow the instructions exactly about when to stop eating and drinking. If you don't, your surgery may be canceled. If your doctor told you to take your medicines on the day of surgery, take them with only a sip of water. Take a bath or shower before you come in for your surgery. Do not apply lotions, perfumes, deodorants, or nail polish. Do not shave the surgical site yourself. Take off all jewelry and piercings. And take out contact lenses, if you wear them. At the hospital or surgery center   Bring a picture ID. The area for surgery is often marked to make sure there are no errors. You will be kept comfortable and safe by your anesthesia provider. You will be asleep during the surgery. Surgery will take several hours. When should you call your doctor? You have questions or concerns. You do not understand how to prepare for your surgery. You become ill before surgery (such as fever, cold or flu, chest pain, or shortness of breath). You need to reschedule or have changed your mind about having the surgery. Where can you learn more? Go to http://www.gray.com/  Enter [id number] in the search box to learn more about \"Lumbar Spinal Fusion: Before Your Surgery. \"  Current as of: July 1, 2021               Content Version: 13.2  © 0732-2120 [employer], [employer].    Care instructions adapted under license by [employer] (which disclaims liability or warranty for this information). If you have questions about a medical condition or this instruction, always ask your healthcare professional. [place] [address] any warranty or liability for your use of this information.

## 2022-10-11 NOTE — PROGRESS NOTES
Tino Beaulieu (: 1955) is a 78 y.o. female patient here for evaluation of the following chief complaint(s):  Follow-up (To discuss Stim trial- [employer])         ASSESSMENT/PLAN:  Below is the assessment and plan developed based on review of pertinent history, physical exam, labs, studies, and medications. 1. S/P lumbar fusion  2. Chronic bilateral low back pain with bilateral sciatica      Patient's radiologic findings have been reviewed with her in detail today. She has chronic lower back pain status post L3-S1 fusion. I reviewed the imaging again. She did have a spinal cord stimulator trial which gave her excellent relief. I do not want to recommend any further extension of her fusion at this time. We talked about permanent placement of the spinal cord stimulator which I think is reasonable. Risk and benefits were discussed with her. Procedure: Dorsal column stimulator placement      The risks and benefits were discussed at length with the patient and the patient has elected to proceed. Indications for surgery include failed conservative treatment. Alternative treatments, risks and the perioperative course were discussed with the patient. All questions were answered. The risks and benefits of the procedure were explained. Benefits include definitive diagnosis, relief of pain, elimination of deformity and improved function. Risks of surgery including bleeding, infection, weakness, numbness, CSF leak, failure to improve symptoms, exacerbation of medical co-morbidities and even death were discussed with the patient. .    No follow-ups on file. SUBJECTIVE/OBJECTIVE:  Tino Beaulieu (: 1955) is a 78 y.o. female who presents today for the following:  Chief Complaint   Patient presents with    Follow-up     To discuss Stim trial- [employer]        She comes in today for a follow-up.   She is status post a L3-S1 fusion with chronic lower back pain and left hip and buttock pain. She did have a spinal cord stimulator trial which gave her good relief. She denies any bowel bladder difficulties. She is here to discuss possible follow-up spinal cord stimulator implant. Neck Pain    Follow-up     Ms. Pimentel turns today for follow-up of her neck. Since her last office visit, she has had 2 visits of outpatient physical therapy. Unfortunately her pain has increased in severity and is constant in nature. Her visit is actually worsened her pain. She has had posterior neck pain for the past 4 to 5 months with new radiation of pain in the right periscapular region. She denies any arm pain, numbness, tingling, or weakness in the arms. No difficulty with balance, falls, or dropping objects. She is having significant spasms in the limited range of motion of her neck that has been limiting her daily activities. She is using Flexeril and Naprosyn currently. IMAGING:  XR Results (most recent):  Results from Appointment encounter on 09/07/22    XR SPINE CERV 4 OR 5 V    Narrative  AP, lateral, flexion, and extension films of the cervical spine reveal no evidence of acute fracture or lytic lesion. There is a loss of normal cervical lordosis. There is a minimal anterolisthesis noted at C4-5 without movement with flexion or extension views. There is moderate to severe disc degeneration and spondylosis particularly at C5-6 and C6-7. MRI Results (most recent):       No Known Allergies    Current Outpatient Medications   Medication Sig    methylPREDNISolone (MEDROL DOSEPACK) 4 mg tablet Per dose pack instructions    cyclobenzaprine (FLEXERIL) 10 mg tablet TAKE 1 TABLET BY MOUTH THREE TIMES A DAY AS NEEDED    naloxone (Narcan) 4 mg/actuation nasal spray Use 1 spray intranasally, then discard. Repeat with new spray every 2 min as needed for opioid overdose symptoms, alternating nostrils.   Indications: decrease in rate & depth of breathing due to opioid drug, opioid overdose furosemide (LASIX) 20 mg tablet Take 20 mg by mouth daily. SUMAtriptan (IMITREX) 100 mg tablet Take 100 mg by mouth once as needed for Migraine. erenumab-aooe (Aimovig Autoinjector, 2 Pack,) 70 mg/mL injection 140 mg by SubCUTAneous route every twenty-eight (28) days. LAST DOSE: 2021     No current facility-administered medications for this visit. Past Medical History:   Diagnosis Date    Arthritis     Other ill-defined conditions(799.89)     MIGRAINES        Past Surgical History:   Procedure Laterality Date    HX GI      COLONOSCOPY MULTIPLE. HX GI      ENDOSCOPY    HX GYN       X 1    HX HYSTERECTOMY      HX ORTHOPAEDIC      Spinal fusion at L4-L5    HX OTHER SURGICAL      DIAGNOSTIC EXPLORATORY SURGERIES MULTIPLE. HX WISDOM TEETH EXTRACTION       X 4    IR INJ FORAMIN EPID LUMB ANES/STER SNGL  12/3/2020    NEUROLOGICAL PROCEDURE UNLISTED      LUMBAR FUSION WITH RODS & SCREWS       Family History   Problem Relation Age of Onset    Cancer Mother         COLON CA    Other Neg Hx         UNKNOWN FAMILY HX        Social History     Tobacco Use    Smoking status: Never    Smokeless tobacco: Never   Substance Use Topics    Alcohol use: No        Review of Systems   Constitutional: Negative. Respiratory: Negative. Cardiovascular: Negative. Gastrointestinal: Negative. Endocrine: Negative. Genitourinary: Negative. Musculoskeletal:  Positive for neck pain and neck stiffness. Skin: Negative. Allergic/Immunologic: Negative. Hematological: Negative. Psychiatric/Behavioral: Negative. All other systems reviewed and are negative. No flowsheet data found. Vitals:  Ht 5' (1.524 m)   Wt 100 lb (45.4 kg)   BMI 19.53 kg/m²    Body mass index is 19.53 kg/m². Physical Exam    Neurologic  Overall Assessment of Muscle Strength and Tone reveals  Upper Extremities - Right Deltoid - 5/5. Left Deltoid - 5/5. Right Bicep - 5/5. Left Bicep - 5/5.  Right Tricep - 5/5. Left Tricep - 5/5. Right Wrist Extensors - 5/5. Left Wrist Extensors - 5/5. Right Wrist Flexors - 5/5. Left Wrist Flexors - 5/5. Right Intrinsics - 5/5. Left Intrinsics - 5/5. General Assessment of Reflexes  Right Hand - Gotti's sign is negative in the right hand. Left Hand - Gotti's sign is negative in the left hand. Reflexes (Dermatomes)  2/2 Normal - Left Bicep (C5-6), Left Tricep (C7-8), Left Brachioradialis (C5-6), Right Bicep (C5-6), Right Tricep (C7-8) and Right Brachioradialis (C5-6). Musculoskeletal  Global Assessment  Examination of related systems reveals - well-developed, well-nourished, in no acute distress, alert and oriented x 3 and normal coordination. Gait and Station - normal gait and station and normal posture. Spine/Ribs/Pelvis  Cervical Spine - Evaluation of related systems reveals - no lymphadenopathy and neurovascularly intact bilaterally. Inspection and Palpation - Tenderness - moderate and localized. Assessment of pain reveals the following findings: - Location - cervical area. ROJM - Flexion - 85 °. Right Lateral Flexion - 35 °. Left Lateral Flexion - 35 °. Extension - 70 °. Right Rotation - 80 °. Left Rotation - 80 °. Cervical Spine - Functional Testing - Foraminal Compression/Spurling's Test negative, Shoulder Depression Test negative, Upper Limb Tension Test negative. Lumbosacral Spine - Examination of the lumbosacral spine reveals - no tenderness to palpation, no pain, normal strength and tone, no laxity or crepitus and normal lumbosacral spine movements. An electronic signature was used to authenticate this note.   -- Lanette Lopez MD

## 2022-10-11 NOTE — PROGRESS NOTES
1. Have you been to the ER, urgent care clinic since your last visit? Hospitalized since your last visit? No    2. Have you seen or consulted any other health care providers outside of the [address] [place] [address] since your last visit? Include any pap smears or colon screening. No    No chief complaint on file.

## 2022-10-11 NOTE — LETTER
10/11/2022    Patient: Tiffany Rose   YOB: 1955   Date of Visit: 10/11/2022     Magnolia Pelaez MD  [address] [address]  Via Fax: [phone]    Dear Magnolia Pelaez MD,      Thank you for referring Ms. Ailyn Ortiz to [facility] for evaluation. My notes for this consultation are attached. If you have questions, please do not hesitate to call me. I look forward to following your patient along with you.       Sincerely,    Ally Jones MD

## 2022-10-19 DIAGNOSIS — M54.42 CHRONIC BILATERAL LOW BACK PAIN WITH BILATERAL SCIATICA: ICD-10-CM

## 2022-10-19 DIAGNOSIS — G89.29 CHRONIC BILATERAL LOW BACK PAIN WITH BILATERAL SCIATICA: ICD-10-CM

## 2022-10-19 DIAGNOSIS — Z98.1 S/P LUMBAR FUSION: Primary | ICD-10-CM

## 2022-10-19 DIAGNOSIS — M54.41 CHRONIC BILATERAL LOW BACK PAIN WITH BILATERAL SCIATICA: ICD-10-CM

## 2022-10-24 RX ORDER — CYCLOBENZAPRINE HCL 10 MG
TABLET ORAL
Qty: 60 TABLET | Refills: 0 | Status: SHIPPED | OUTPATIENT
Start: 2022-10-24 | End: 2022-11-18

## 2022-10-31 NOTE — H&P
Quincy Sheldon (: 1955) is a 78 y.o. female patient here for evaluation of the following chief complaint(s):  Follow-up (To discuss Stim trial- [employer])           ASSESSMENT/PLAN:  Below is the assessment and plan developed based on review of pertinent history, physical exam, labs, studies, and medications. 1. S/P lumbar fusion  2. Chronic bilateral low back pain with bilateral sciatica        Patient's radiologic findings have been reviewed with her in detail today. She has chronic lower back pain status post L3-S1 fusion. I reviewed the imaging again. She did have a spinal cord stimulator trial which gave her excellent relief. I do not want to recommend any further extension of her fusion at this time. We talked about permanent placement of the spinal cord stimulator which I think is reasonable. Risk and benefits were discussed with her. Procedure: Dorsal column stimulator placement        The risks and benefits were discussed at length with the patient and the patient has elected to proceed. Indications for surgery include failed conservative treatment. Alternative treatments, risks and the perioperative course were discussed with the patient. All questions were answered. The risks and benefits of the procedure were explained. Benefits include definitive diagnosis, relief of pain, elimination of deformity and improved function. Risks of surgery including bleeding, infection, weakness, numbness, CSF leak, failure to improve symptoms, exacerbation of medical co-morbidities and even death were discussed with the patient. .     No follow-ups on file. Date of Surgery Update:  Quincy Sheldon was seen and examined. History and physical has been reviewed. The patient has been examined.  There have been no significant clinical changes since the completion of the originally dated History and Physical.    Signed By: Nikki Flanagan MD     2022 7:21 AM SUBJECTIVE/OBJECTIVE:  Curtis Milner (: 1955) is a 78 y.o. female who presents today for the following:       Chief Complaint   Patient presents with    Follow-up       To discuss Stim trial- [employer]         She comes in today for a follow-up. She is status post a L3-S1 fusion with chronic lower back pain and left hip and buttock pain. She did have a spinal cord stimulator trial which gave her good relief. She denies any bowel bladder difficulties. She is here to discuss possible follow-up spinal cord stimulator implant. Neck Pain     Follow-up     Ms. Pimentel turns today for follow-up of her neck. Since her last office visit, she has had 2 visits of outpatient physical therapy. Unfortunately her pain has increased in severity and is constant in nature. Her visit is actually worsened her pain. She has had posterior neck pain for the past 4 to 5 months with new radiation of pain in the right periscapular region. She denies any arm pain, numbness, tingling, or weakness in the arms. No difficulty with balance, falls, or dropping objects. She is having significant spasms in the limited range of motion of her neck that has been limiting her daily activities. She is using Flexeril and Naprosyn currently. IMAGING:  XR Results (most recent):  Results from Appointment encounter on 22     XR SPINE CERV 4 OR 5 V     Narrative  AP, lateral, flexion, and extension films of the cervical spine reveal no evidence of acute fracture or lytic lesion. There is a loss of normal cervical lordosis. There is a minimal anterolisthesis noted at C4-5 without movement with flexion or extension views. There is moderate to severe disc degeneration and spondylosis particularly at C5-6 and C6-7.         MRI Results (most recent):        No Known Allergies          Current Outpatient Medications   Medication Sig    methylPREDNISolone (MEDROL DOSEPACK) 4 mg tablet Per dose pack instructions cyclobenzaprine (FLEXERIL) 10 mg tablet TAKE 1 TABLET BY MOUTH THREE TIMES A DAY AS NEEDED    naloxone (Narcan) 4 mg/actuation nasal spray Use 1 spray intranasally, then discard. Repeat with new spray every 2 min as needed for opioid overdose symptoms, alternating nostrils. Indications: decrease in rate & depth of breathing due to opioid drug, opioid overdose    furosemide (LASIX) 20 mg tablet Take 20 mg by mouth daily. SUMAtriptan (IMITREX) 100 mg tablet Take 100 mg by mouth once as needed for Migraine. erenumab-aooe (Aimovig Autoinjector, 2 Pack,) 70 mg/mL injection 140 mg by SubCUTAneous route every twenty-eight (28) days. LAST DOSE: 2021      No current facility-administered medications for this visit. Past Medical History:   Diagnosis Date    Arthritis      Other ill-defined conditions(799.89)       MIGRAINES               Past Surgical History:   Procedure Laterality Date    HX GI         COLONOSCOPY MULTIPLE. HX GI         ENDOSCOPY    HX GYN          X 1    HX HYSTERECTOMY        HX ORTHOPAEDIC         Spinal fusion at L4-L5    HX OTHER SURGICAL         DIAGNOSTIC EXPLORATORY SURGERIES MULTIPLE. HX WISDOM TEETH EXTRACTION          X 4    IR INJ FORAMIN EPID LUMB ANES/STER SNGL   12/3/2020    NEUROLOGICAL PROCEDURE UNLISTED         LUMBAR FUSION WITH RODS & SCREWS               Family History   Problem Relation Age of Onset    Cancer Mother           COLON CA    Other Neg Hx           UNKNOWN FAMILY HX         Social History           Tobacco Use    Smoking status: Never    Smokeless tobacco: Never   Substance Use Topics    Alcohol use: No         Review of Systems   Constitutional: Negative. Respiratory: Negative. Cardiovascular: Negative. Gastrointestinal: Negative. Endocrine: Negative. Genitourinary: Negative. Musculoskeletal:  Positive for neck pain and neck stiffness. Skin: Negative. Allergic/Immunologic: Negative.     Hematological: Negative. Psychiatric/Behavioral: Negative. All other systems reviewed and are negative. No flowsheet data found. Vitals:  Ht 5' (1.524 m)   Wt 100 lb (45.4 kg)   BMI 19.53 kg/m²    Body mass index is 19.53 kg/m². Physical Exam     Neurologic  Overall Assessment of Muscle Strength and Tone reveals  Upper Extremities - Right Deltoid - 5/5. Left Deltoid - 5/5. Right Bicep - 5/5. Left Bicep - 5/5. Right Tricep - 5/5. Left Tricep - 5/5. Right Wrist Extensors - 5/5. Left Wrist Extensors - 5/5. Right Wrist Flexors - 5/5. Left Wrist Flexors - 5/5. Right Intrinsics - 5/5. Left Intrinsics - 5/5. General Assessment of Reflexes  Right Hand - Gotti's sign is negative in the right hand. Left Hand - Gotti's sign is negative in the left hand. Reflexes (Dermatomes)  2/2 Normal - Left Bicep (C5-6), Left Tricep (C7-8), Left Brachioradialis (C5-6), Right Bicep (C5-6), Right Tricep (C7-8) and Right Brachioradialis (C5-6). Musculoskeletal  Global Assessment  Examination of related systems reveals - well-developed, well-nourished, in no acute distress, alert and oriented x 3 and normal coordination. Gait and Station - normal gait and station and normal posture. Spine/Ribs/Pelvis  Cervical Spine - Evaluation of related systems reveals - no lymphadenopathy and neurovascularly intact bilaterally. Inspection and Palpation - Tenderness - moderate and localized. Assessment of pain reveals the following findings: - Location - cervical area. ROJM - Flexion - 85 °. Right Lateral Flexion - 35 °. Left Lateral Flexion - 35 °. Extension - 70 °. Right Rotation - 80 °. Left Rotation - 80 °. Cervical Spine - Functional Testing - Foraminal Compression/Spurling's Test negative, Shoulder Depression Test negative, Upper Limb Tension Test negative.  Lumbosacral Spine - Examination of the lumbosacral spine reveals - no tenderness to palpation, no pain, normal strength and tone, no laxity or crepitus and normal lumbosacral spine movements. An electronic signature was used to authenticate this note.   -- Cammie Ayala MD

## 2022-11-01 ENCOUNTER — HOSPITAL ENCOUNTER (OUTPATIENT)
Dept: PREADMISSION TESTING | Age: 67
Discharge: HOME | End: 2022-11-01
Payer: COMMERCIAL

## 2022-11-01 VITALS
OXYGEN SATURATION: 100 % | HEART RATE: 73 BPM | SYSTOLIC BLOOD PRESSURE: 148 MMHG | BODY MASS INDEX: 19.87 KG/M2 | TEMPERATURE: 97.9 F | HEIGHT: 60 IN | DIASTOLIC BLOOD PRESSURE: 84 MMHG | WEIGHT: 101.19 LBS

## 2022-11-01 LAB
ABO + RH BLD: NORMAL
ANION GAP SERPL CALC-SCNC: 3 MMOL/L (ref 5–15)
APPEARANCE UR: CLEAR
ATRIAL RATE: 67 BPM
BACTERIA URNS QL MICRO: NEGATIVE /HPF
BILIRUB UR QL: NEGATIVE
BLOOD GROUP ANTIBODIES SERPL: NORMAL
BUN SERPL-MCNC: 14 MG/DL (ref 6–20)
BUN/CREAT SERPL: 17 (ref 12–20)
CALCIUM SERPL-MCNC: 9.3 MG/DL (ref 8.5–10.1)
CALCULATED P AXIS, ECG09: 75 DEGREES
CALCULATED R AXIS, ECG10: 85 DEGREES
CALCULATED T AXIS, ECG11: 57 DEGREES
CHLORIDE SERPL-SCNC: 112 MMOL/L (ref 97–108)
CO2 SERPL-SCNC: 27 MMOL/L (ref 21–32)
COLOR UR: NORMAL
CREAT SERPL-MCNC: 0.83 MG/DL (ref 0.55–1.02)
DIAGNOSIS, 93000: NORMAL
EPITH CASTS URNS QL MICRO: NORMAL /LPF
ERYTHROCYTE [DISTWIDTH] IN BLOOD BY AUTOMATED COUNT: 12.7 % (ref 11.5–14.5)
EST. AVERAGE GLUCOSE BLD GHB EST-MCNC: 108 MG/DL
GLUCOSE SERPL-MCNC: 85 MG/DL (ref 65–100)
GLUCOSE UR STRIP.AUTO-MCNC: NEGATIVE MG/DL
HBA1C MFR BLD: 5.4 % (ref 4–5.6)
HCT VFR BLD AUTO: 37.3 % (ref 35–47)
HGB BLD-MCNC: 11.6 G/DL (ref 11.5–16)
HGB UR QL STRIP: NEGATIVE
HYALINE CASTS URNS QL MICRO: NORMAL /LPF (ref 0–5)
INR PPP: 1 (ref 0.9–1.1)
KETONES UR QL STRIP.AUTO: NEGATIVE MG/DL
LEUKOCYTE ESTERASE UR QL STRIP.AUTO: NEGATIVE
MCH RBC QN AUTO: 29.4 PG (ref 26–34)
MCHC RBC AUTO-ENTMCNC: 31.1 G/DL (ref 30–36.5)
MCV RBC AUTO: 94.7 FL (ref 80–99)
NITRITE UR QL STRIP.AUTO: NEGATIVE
NRBC # BLD: 0 K/UL (ref 0–0.01)
NRBC BLD-RTO: 0 PER 100 WBC
P-R INTERVAL, ECG05: 150 MS
PH UR STRIP: 7 [PH] (ref 5–8)
PLATELET # BLD AUTO: 185 K/UL (ref 150–400)
PMV BLD AUTO: 11.1 FL (ref 8.9–12.9)
POTASSIUM SERPL-SCNC: 4.1 MMOL/L (ref 3.5–5.1)
PROT UR STRIP-MCNC: NEGATIVE MG/DL
PROTHROMBIN TIME: 10.8 SEC (ref 9–11.1)
Q-T INTERVAL, ECG07: 406 MS
QRS DURATION, ECG06: 96 MS
QTC CALCULATION (BEZET), ECG08: 429 MS
RBC # BLD AUTO: 3.94 M/UL (ref 3.8–5.2)
RBC #/AREA URNS HPF: NORMAL /HPF (ref 0–5)
SODIUM SERPL-SCNC: 142 MMOL/L (ref 136–145)
SP GR UR REFRACTOMETRY: 1.01 (ref 1–1.03)
SPECIMEN EXP DATE BLD: NORMAL
UA: UC IF INDICATED,UAUC: NORMAL
UROBILINOGEN UR QL STRIP.AUTO: 0.2 EU/DL (ref 0.2–1)
VENTRICULAR RATE, ECG03: 67 BPM
WBC # BLD AUTO: 2.9 K/UL (ref 3.6–11)
WBC URNS QL MICRO: NORMAL /HPF (ref 0–4)

## 2022-11-01 PROCEDURE — 83036 HEMOGLOBIN GLYCOSYLATED A1C: CPT

## 2022-11-01 PROCEDURE — 93005 ELECTROCARDIOGRAM TRACING: CPT

## 2022-11-01 PROCEDURE — 85610 PROTHROMBIN TIME: CPT

## 2022-11-01 PROCEDURE — 86900 BLOOD TYPING SEROLOGIC ABO: CPT

## 2022-11-01 PROCEDURE — 81001 URINALYSIS AUTO W/SCOPE: CPT

## 2022-11-01 PROCEDURE — 36415 COLL VENOUS BLD VENIPUNCTURE: CPT

## 2022-11-01 PROCEDURE — 80048 BASIC METABOLIC PNL TOTAL CA: CPT

## 2022-11-01 PROCEDURE — 85027 COMPLETE CBC AUTOMATED: CPT

## 2022-11-01 RX ORDER — TOPIRAMATE 200 MG/1
200 TABLET ORAL DAILY
COMMUNITY

## 2022-11-01 RX ORDER — SUMATRIPTAN 50 MG/1
50 TABLET, FILM COATED ORAL AS NEEDED
COMMUNITY

## 2022-11-01 RX ORDER — NAPROXEN 500 MG/1
500 TABLET ORAL
COMMUNITY

## 2022-11-01 RX ORDER — FREMANEZUMAB-VFRM 225 MG/1.5ML
225 INJECTION SUBCUTANEOUS ONCE
COMMUNITY

## 2022-11-01 RX ORDER — HYDROCODONE BITARTRATE AND ACETAMINOPHEN 10; 325 MG/1; MG/1
1 TABLET ORAL
COMMUNITY
End: 2022-11-07

## 2022-11-01 NOTE — PERIOP NOTES
Preoperative instructions reviewed with patient. Patient given two bottles CHG soap. Instructions reviewed on use of CHG soap. Patient given SSI infection sheet. MRSA/MSSA treatment instruction sheet given with an explanation to patient that they  will be notified if treatment instructions need to be initiated. Patient was given the opportunity to ask questions on the information provided.

## 2022-11-01 NOTE — PERIOP NOTES
[address]  ORTHOPAEDIC    Surgery Date:   11-7-22    Your surgeon's office or [facility] staff will call you between 4 PM- 8 PM the day before surgery with your arrival time. If your surgery is on a Monday, you will receive a call the preceding Friday. Please report to [address] Patient Access/Admitting on the 1st floor. Bring your insurance card, photo identification, and any copayment (if applicable). If you are going home the same day of your surgery, you must have a responsible adult to drive you home. You need to have a responsible adult to stay with you the first 24 hours after surgery and you should not drive a car for 24 hours following your surgery. Do NOT eat any solid foods after midnight the night before surgery including candy, mints or gum. You may drink clear liquids from midnight until 1 hour prior to arrival time. You may drink up to 12 ounces at one time every 4 hours. Do NOT drink alcohol or smoke 24 hours before surgery. STOP smoking for 14 days prior as it helps with breathing and healing after surgery. If your arrival time is 3pm or later, you may eat a light breakfast before 8am (toast, bagel-no butter, black coffee, plain tea, fruit juice-no pulp) Please note special instructions, if applicable, below for medications. If you are being admitted to the hospital,please leave personal belongings/luggage in your car until you have an assigned hospital room number. Please wear comfortable clothes. Wear your glasses instead of contacts. We ask that all money, jewelry and valuables be left at home. Wear no make up, particularly mascara, the day of surgery. All body piercings, rings, and jewelry need to be removed and left at home. Please remove any nail polish or artificial nails from your fingernails. Please wear your hair loose or down. Please no pony-tails, buns, or any metal hair accessories.  If you shower the morning of surgery, please do not apply any lotions or powders afterwards. You may wear deodorant. Do not shave any body area within 24 hours of your surgery. Please follow all instructions to avoid any potential surgical cancellation. Should your physical condition change, (i.e. fever, cold, flu, etc.) please notify your surgeon as soon as possible. It is important to be on time. If a situation occurs where you may be delayed, please call:  [phone] / [phone] on the day of surgery. The Preadmission Testing staff can be reached at [phone]. Special instructions: NONE    Current Outpatient Medications   Medication Sig    naproxen (NAPROSYN) 500 mg tablet Take 500 mg by mouth two (2) times daily as needed for Pain. SUMAtriptan (IMITREX) 50 mg tablet Take 50 mg by mouth as needed for Migraine. fremanezumab-vfrm (Ajovy Autoinjector) 225 mg/1.5 mL auto-injector 225 mg by SubCUTAneous route once. HYDROcodone-acetaminophen (NORCO)  mg tablet Take 1 Tablet by mouth every eight (8) hours as needed for Pain. topiramate (Topamax) 200 mg tablet Take 200 mg by mouth daily. cyclobenzaprine (FLEXERIL) 10 mg tablet TAKE 1 TABLET BY MOUTH THREE TIMES A DAY AS NEEDED    traMADoL (ULTRAM) 50 mg tablet Take 1 Tablet by mouth every six (6) hours as needed for Pain for up to 30 days. Max Daily Amount: 200 mg. Indications: pain    furosemide (LASIX) 20 mg tablet Take 20 mg by mouth daily. naloxone (Narcan) 4 mg/actuation nasal spray Use 1 spray intranasally, then discard. Repeat with new spray every 2 min as needed for opioid overdose symptoms, alternating nostrils. Indications: decrease in rate & depth of breathing due to opioid drug, opioid overdose (Patient not taking: Reported on 11/1/2022)     No current facility-administered medications for this encounter.        YOU MUST ONLY TAKE THESE MEDICATIONS THE MORNING OF SURGERY WITH A SIP OF WATER: TOPAMAX  MEDICATIONS TO TAKE THE MORNING OF SURGERY ONLY IF NEEDED: HYDROCODONE OR TRAMADOL (4 HOURS BEFORE ARRIVAL TO HOSPITAL)  HOLD these prescription medications BEFORE Surgery: FUROSEMIDE  STOP NAPROXEN 11-1-22  Ask your surgeon/prescribing physician about when/if to STOP taking these medications: N/A  Stop any non-steroidal anti-inflammatory drugs (i.e. Ibuprofen, Naproxen, Advil, Aleve) 7 days before surgery. You may take Tylenol. STOP all vitamins and herbal supplements 1 week prior to  surgery. If you are currently taking Plavix, Coumadin, or any other blood-thinning/anticoagulant medication contact your prescribing physician for instructions. Preventing Infections Before and After - Your Surgery    IMPORTANT INSTRUCTIONS    You play an important role in your health and preparation for surgery. To reduce the germs on your skin you will need to shower with CHG soap (Chorhexidine gluconate 4%) two times before surgery. CHG soap (Hibiclens, Hex-A-Clens or store brand)  CHG soap will be provided at your Preadmission Testing (PAT) appointment. If you do not have a PAT appointment before surgery, you may arrange to  CHG soap from our office or purchase CHG soap at a pharmacy, grocery or department store. You need to purchase TWO 4 ounce bottles to use for your 2 showers. Steps to follow:  Escalera Jyotsna your hair with your normal shampoo and your body with regular soap and rinse well to remove shampoo and soap from your skin. Wet a clean washcloth and turn off the shower. Put CHG soap on washcloth and apply to your entire body from the neck down. Do not use on your head, face or private parts(genitals). Do not use CHG soap on open sores, wounds or areas of skin irritation. Wash you body gently for 5 minutes. Do not wash your skin too hard. This soap does not create lather. Pay special attention to your underarms and from your belly button to your feet. Turn the shower back on and rinse well to get CHG soap off your body. Pat your skin dry with a clean, dry towel. Do not apply lotions or moisturizer. Put on clean clothes and sleep on fresh bed sheets and do not allow pets to sleep with you. Shower with CHG soap 2 times before your surgery  The evening before your surgery  The morning of your surgery      Tips to help prevent infections after your surgery:  Protect your surgical wound from germs:  Hand washing is the most important thing you and your caregivers can do to prevent infections. Keep your bandage clean and dry! Do not touch your surgical wound. Use clean, freshly washed towels and washcloths every time you shower; do not share bath linens with others. Until your surgical wound is healed, wear clothing and sleep on bed linens each day that are clean and freshly washed. Do not allow pets to sleep in your bed with you or touch your surgical wound. Do not smoke - smoking delays wound healing. This may be a good time to stop smoking. If you have diabetes, it is important for you to manage your blood sugar levels properly before your surgery as well as after your surgery. Poorly managed blood sugar levels slow down wound healing and prevent you from healing completely. Prevention of Infection  Testing for Staphylococcus aureus on your skin before surgery    Staphylococcus aureus (staph) is a common bacteria that is found on the body. It normally does not cause infection on healthy skin. Before surgery, you will be tested to see if you have staph by swabbing the inside of your nose. When you have an incision with surgery, the goal is to protect that incision from infection. Removal of the staph bacteria before surgery can decrease the risk of a surgical site infection. If your nose swab is positive for staph you will be called. Your treatment will include 2 steps:  Prescription for Mupirocin ointment to be used in each nostril twice a day for 5 days. Showering with Chlorhexidine (CHG) liquid soap for 5 days prior to surgery.     How to use Mupirocin ointment in your nose   the prescription from your pharmacy. You will receive a large tube of ointment which will be big enough for all of your treatments. You will apply this ointment to each nostril 2 times a day for 5 days. Wash your hands with  gel or soap and water for 20 seconds before using ointment. Place a pea-sized amount of ointment on a cotton Q-tip. Apply ointment just inside of each nostril with the Q-tip. Do not push Q-tip or ointment deep inside you nose. Press your nostrils together and massage for a few seconds. Wash your hands with  gel or soap and water after you are finished. Do not get ointment near your eyes. If it gets into your eyes, rinse them with cool water. If you need to use nasal spray, clean the tip of the bottle with alcohol before use and do not use both at the same time. If you are scheduled for COVID testing during the 5 days, do NOT apply morning dose until after the COVID test has been performed. How to use Chlorhexidine (CHG) 4% liquid soap  Purchase an 8 ounce bottle of CHG liquid soap (Chlorhexidine 4%, Hibiclens, Hex-A-Clens or store brand) at a pharmacy or grocery store. Wash your hair with your normal shampoo and your body with regular soap and rinse well to remove shampoo and soap from your skin. Wet a clean washcloth and turn off the shower. Put CHG soap on washcloth and apply to your entire body from the neck down. Do not use on your head, face or private parts(genitals). Do not use CHG soap on open sores, wounds or areas of skin irritation. Wash your body gently for 5 minutes. Do not wash your skin too hard. This soap does not create lather. Pay special attention to your underarms and from your belly button to your feet. Turn the shower back on and rinse well to get CHG soap off your body. Pat your skin dry with a clean, dry towel. Do not apply lotions or moisturizer.   Put on clean clothes and sleep on fresh bed sheets the night before surgery. Do not allow pets to sleep with you. Eating and Drinking Before Surgery    You may eat a regular dinner at the usual time on the day before your surgery. Do NOT eat any solid foods after midnight unless your arrival time at the hospital is 3pm or later. You may drink clear liquids only from 12 midnight until 1 hours prior to your arrival time at the hospital on the day of your surgery. Do NOT drink alcohol. Clear liquids include:  Water  Fruit juices without pulp( i.e. apple juice)  Carbonated beverages  Black coffee (no cream/milk)  Tea (no cream/milk)  Gatorade  You may drink up to 12-16 ounces at one time every 4 hours between the hours of midnight and 1 hour before your arrival time at the hospital. Example- if your arrival time at the hospital is 6am, you may drink 12-16 ounces of clear liquids no later than 5am.  If your arrival time at the hospital is 3pm or later, you may eat a light breakfast before 8am.  A light breakfast includes: Toast or bagel (no butter)  Black coffee (no cream/milk)  Tea (no cream/milk)  Fruit juices without pulp ( i.e. apple juice)  Do NOT eat meat, eggs, vegetables or fruit  If you have any questions, please contact your surgeon's office. Patient Information Regarding COVID Restrictions    Day of Procedure    Please park in the parking deck or any designated visitor parking lot. Enter the facility through the Main Entrance of the hospital.  On the day of surgery, please provide the cell phone number of the person who will be waiting for you to the Patient Access representative at the time of registration. Masks are highly recommended in the hospital, but not required. Once your procedure and the immediate recovery period is completed, a nurse in the recovery area will contact your designated visitor to inform them of your room number or to otherwise review other pertinent information regarding your care.     Social distancing practices are strongly encouraged in waiting areas and the cafeteria. The patient was contacted in person. She verbalized understanding of all instructions does not  need reinforcement.

## 2022-11-02 LAB
BACTERIA SPEC CULT: NORMAL
BACTERIA SPEC CULT: NORMAL
SERVICE CMNT-IMP: NORMAL

## 2022-11-06 ENCOUNTER — ANESTHESIA EVENT (OUTPATIENT)
Dept: SURGERY | Age: 67
End: 2022-11-06
Payer: COMMERCIAL

## 2022-11-07 ENCOUNTER — APPOINTMENT (OUTPATIENT)
Dept: GENERAL RADIOLOGY | Age: 67
End: 2022-11-07
Attending: ORTHOPAEDIC SURGERY
Payer: COMMERCIAL

## 2022-11-07 ENCOUNTER — HOSPITAL ENCOUNTER (OUTPATIENT)
Age: 67
Discharge: HOME | End: 2022-11-07
Attending: ORTHOPAEDIC SURGERY | Admitting: ORTHOPAEDIC SURGERY
Payer: COMMERCIAL

## 2022-11-07 ENCOUNTER — ANESTHESIA (OUTPATIENT)
Dept: SURGERY | Age: 67
End: 2022-11-07
Payer: COMMERCIAL

## 2022-11-07 VITALS
RESPIRATION RATE: 8 BRPM | BODY MASS INDEX: 19.87 KG/M2 | HEART RATE: 72 BPM | HEIGHT: 60 IN | OXYGEN SATURATION: 98 % | TEMPERATURE: 98 F | SYSTOLIC BLOOD PRESSURE: 127 MMHG | DIASTOLIC BLOOD PRESSURE: 75 MMHG | WEIGHT: 101.19 LBS

## 2022-11-07 DIAGNOSIS — Z98.890 S/P SPINAL SURGERY: ICD-10-CM

## 2022-11-07 DIAGNOSIS — G89.29 CHRONIC BILATERAL LOW BACK PAIN WITHOUT SCIATICA: Primary | ICD-10-CM

## 2022-11-07 DIAGNOSIS — M54.50 CHRONIC BILATERAL LOW BACK PAIN WITHOUT SCIATICA: Primary | ICD-10-CM

## 2022-11-07 PROCEDURE — C1778 LEAD, NEUROSTIMULATOR: HCPCS | Performed by: ORTHOPAEDIC SURGERY

## 2022-11-07 PROCEDURE — 74011000250 HC RX REV CODE- 250: Performed by: NURSE ANESTHETIST, CERTIFIED REGISTERED

## 2022-11-07 PROCEDURE — C1820 GENERATOR NEURO RECHG BAT SY: HCPCS | Performed by: ORTHOPAEDIC SURGERY

## 2022-11-07 PROCEDURE — C1889 IMPLANT/INSERT DEVICE, NOC: HCPCS | Performed by: ORTHOPAEDIC SURGERY

## 2022-11-07 PROCEDURE — 77030014076 HC TOOL TUNNEL BSC -C: Performed by: ORTHOPAEDIC SURGERY

## 2022-11-07 PROCEDURE — 63655 IMPLANT NEUROELECTRODES: CPT | Performed by: STUDENT IN AN ORGANIZED HEALTH CARE EDUCATION/TRAINING PROGRAM

## 2022-11-07 PROCEDURE — 77030012716 HC ELEV PASS NEURO BSC -B: Performed by: ORTHOPAEDIC SURGERY

## 2022-11-07 PROCEDURE — 77030008684 HC TU ET CUF COVD -B: Performed by: ANESTHESIOLOGY

## 2022-11-07 PROCEDURE — 74011250636 HC RX REV CODE- 250/636: Performed by: ORTHOPAEDIC SURGERY

## 2022-11-07 PROCEDURE — 76060000034 HC ANESTHESIA 1.5 TO 2 HR: Performed by: ORTHOPAEDIC SURGERY

## 2022-11-07 PROCEDURE — 74011250636 HC RX REV CODE- 250/636: Performed by: ANESTHESIOLOGY

## 2022-11-07 PROCEDURE — 77030002986 HC SUT PROL J&J -A: Performed by: ORTHOPAEDIC SURGERY

## 2022-11-07 PROCEDURE — 77030037343 HC KT REMOT CTRL FRELNK MRI BSC -G: Performed by: ORTHOPAEDIC SURGERY

## 2022-11-07 PROCEDURE — 77030038600 HC TU BPLR IRR DISP STRY -B: Performed by: ORTHOPAEDIC SURGERY

## 2022-11-07 PROCEDURE — 76210000017 HC OR PH I REC 1.5 TO 2 HR: Performed by: ORTHOPAEDIC SURGERY

## 2022-11-07 PROCEDURE — 76010000149 HC OR TIME 1 TO 1.5 HR: Performed by: ORTHOPAEDIC SURGERY

## 2022-11-07 PROCEDURE — 77030008462 HC STPLR SKN PROX J&J -A: Performed by: ORTHOPAEDIC SURGERY

## 2022-11-07 PROCEDURE — 74011000250 HC RX REV CODE- 250: Performed by: ORTHOPAEDIC SURGERY

## 2022-11-07 PROCEDURE — 77030035236 HC SUT PDS STRATFX BARB J&J -B: Performed by: ORTHOPAEDIC SURGERY

## 2022-11-07 PROCEDURE — 63685 INS/RPLC SPI NPG/RCVR POCKET: CPT | Performed by: STUDENT IN AN ORGANIZED HEALTH CARE EDUCATION/TRAINING PROGRAM

## 2022-11-07 PROCEDURE — 77030029099 HC BN WAX SSPC -A: Performed by: ORTHOPAEDIC SURGERY

## 2022-11-07 PROCEDURE — 74011000250 HC RX REV CODE- 250: Performed by: PHYSICIAN ASSISTANT

## 2022-11-07 PROCEDURE — 74011250636 HC RX REV CODE- 250/636: Performed by: PHYSICIAN ASSISTANT

## 2022-11-07 PROCEDURE — 63655 IMPLANT NEUROELECTRODES: CPT | Performed by: ORTHOPAEDIC SURGERY

## 2022-11-07 PROCEDURE — 2709999900 HC NON-CHARGEABLE SUPPLY: Performed by: ORTHOPAEDIC SURGERY

## 2022-11-07 PROCEDURE — 77030026438 HC STYL ET INTUB CARD -A: Performed by: ANESTHESIOLOGY

## 2022-11-07 PROCEDURE — 77030020268 HC MISC GENERAL SUPPLY: Performed by: ORTHOPAEDIC SURGERY

## 2022-11-07 PROCEDURE — 74011250636 HC RX REV CODE- 250/636: Performed by: NURSE ANESTHETIST, CERTIFIED REGISTERED

## 2022-11-07 PROCEDURE — 74011250637 HC RX REV CODE- 250/637: Performed by: ANESTHESIOLOGY

## 2022-11-07 PROCEDURE — 72020 X-RAY EXAM OF SPINE 1 VIEW: CPT

## 2022-11-07 PROCEDURE — 63685 INS/RPLC SPI NPG/RCVR POCKET: CPT | Performed by: ORTHOPAEDIC SURGERY

## 2022-11-07 DEVICE — 50CM 4X8 SURGICAL LEAD KIT
Type: IMPLANTABLE DEVICE | Site: SPINE LUMBAR | Status: NON-FUNCTIONAL
Brand: COVEREDGE™ 32
Removed: 2023-02-06

## 2022-11-07 DEVICE — 50CM 4X8 SURGICAL LEAD KIT
Type: IMPLANTABLE DEVICE | Site: SPINE LUMBAR | Status: FUNCTIONAL
Brand: COVEREDGE™ 32

## 2022-11-07 DEVICE — STIMULATOR WAVEWRITER ALPHA IMPL PULSE GENRTR KIT
Type: IMPLANTABLE DEVICE | Site: SPINE LUMBAR | Status: NON-FUNCTIONAL
Removed: 2023-02-06

## 2022-11-07 DEVICE — GRAFT HUM TISS 3X4 CM WND COVERING AMNIO MEMBRN VERSASHIELD: Type: IMPLANTABLE DEVICE | Site: SPINE LUMBAR | Status: FUNCTIONAL

## 2022-11-07 DEVICE — STIMULATOR WAVEWRITER ALPHA IMPL PULSE GENRTR KIT: Type: IMPLANTABLE DEVICE | Site: SPINE LUMBAR | Status: FUNCTIONAL

## 2022-11-07 RX ORDER — HYDROMORPHONE HYDROCHLORIDE 1 MG/ML
0.2 INJECTION, SOLUTION INTRAMUSCULAR; INTRAVENOUS; SUBCUTANEOUS
Status: DISCONTINUED | OUTPATIENT
Start: 2022-11-07 | End: 2022-11-07 | Stop reason: HOSPADM

## 2022-11-07 RX ORDER — FUROSEMIDE 20 MG/1
20 TABLET ORAL DAILY
Status: CANCELLED | OUTPATIENT
Start: 2022-11-08

## 2022-11-07 RX ORDER — SUCCINYLCHOLINE CHLORIDE 20 MG/ML
INJECTION INTRAMUSCULAR; INTRAVENOUS AS NEEDED
Status: DISCONTINUED | OUTPATIENT
Start: 2022-11-07 | End: 2022-11-07 | Stop reason: HOSPADM

## 2022-11-07 RX ORDER — SODIUM CHLORIDE 0.9 % (FLUSH) 0.9 %
5-40 SYRINGE (ML) INJECTION EVERY 8 HOURS
Status: DISCONTINUED | OUTPATIENT
Start: 2022-11-07 | End: 2022-11-07 | Stop reason: HOSPADM

## 2022-11-07 RX ORDER — KETOROLAC TROMETHAMINE 30 MG/ML
INJECTION, SOLUTION INTRAMUSCULAR; INTRAVENOUS AS NEEDED
Status: DISCONTINUED | OUTPATIENT
Start: 2022-11-07 | End: 2022-11-07 | Stop reason: HOSPADM

## 2022-11-07 RX ORDER — SODIUM CHLORIDE, SODIUM LACTATE, POTASSIUM CHLORIDE, CALCIUM CHLORIDE 600; 310; 30; 20 MG/100ML; MG/100ML; MG/100ML; MG/100ML
INJECTION, SOLUTION INTRAVENOUS
Status: DISCONTINUED | OUTPATIENT
Start: 2022-11-07 | End: 2022-11-07 | Stop reason: HOSPADM

## 2022-11-07 RX ORDER — OXYCODONE HYDROCHLORIDE 5 MG/1
5 TABLET ORAL
Qty: 40 TABLET | Refills: 0 | Status: SHIPPED | OUTPATIENT
Start: 2022-11-07 | End: 2022-11-14

## 2022-11-07 RX ORDER — TOPIRAMATE 100 MG/1
200 TABLET, FILM COATED ORAL DAILY
Status: CANCELLED | OUTPATIENT
Start: 2022-11-08

## 2022-11-07 RX ORDER — SODIUM CHLORIDE 0.9 % (FLUSH) 0.9 %
5-40 SYRINGE (ML) INJECTION AS NEEDED
Status: CANCELLED | OUTPATIENT
Start: 2022-11-07

## 2022-11-07 RX ORDER — SODIUM CHLORIDE 0.9 % (FLUSH) 0.9 %
5-40 SYRINGE (ML) INJECTION EVERY 8 HOURS
Status: CANCELLED | OUTPATIENT
Start: 2022-11-07

## 2022-11-07 RX ORDER — FENTANYL CITRATE 50 UG/ML
50 INJECTION, SOLUTION INTRAMUSCULAR; INTRAVENOUS AS NEEDED
Status: DISCONTINUED | OUTPATIENT
Start: 2022-11-07 | End: 2022-11-07 | Stop reason: HOSPADM

## 2022-11-07 RX ORDER — LIDOCAINE HYDROCHLORIDE 20 MG/ML
INJECTION, SOLUTION EPIDURAL; INFILTRATION; INTRACAUDAL; PERINEURAL AS NEEDED
Status: DISCONTINUED | OUTPATIENT
Start: 2022-11-07 | End: 2022-11-07 | Stop reason: HOSPADM

## 2022-11-07 RX ORDER — SODIUM CHLORIDE 0.9 % (FLUSH) 0.9 %
5-40 SYRINGE (ML) INJECTION AS NEEDED
Status: DISCONTINUED | OUTPATIENT
Start: 2022-11-07 | End: 2022-11-07 | Stop reason: HOSPADM

## 2022-11-07 RX ORDER — ONDANSETRON 2 MG/ML
4 INJECTION INTRAMUSCULAR; INTRAVENOUS AS NEEDED
Status: DISCONTINUED | OUTPATIENT
Start: 2022-11-07 | End: 2022-11-07 | Stop reason: HOSPADM

## 2022-11-07 RX ORDER — PROPOFOL 10 MG/ML
INJECTION, EMULSION INTRAVENOUS AS NEEDED
Status: DISCONTINUED | OUTPATIENT
Start: 2022-11-07 | End: 2022-11-07 | Stop reason: HOSPADM

## 2022-11-07 RX ORDER — OXYCODONE HYDROCHLORIDE 5 MG/1
10 TABLET ORAL
Status: CANCELLED | OUTPATIENT
Start: 2022-11-07

## 2022-11-07 RX ORDER — NALOXONE HYDROCHLORIDE 0.4 MG/ML
0.4 INJECTION, SOLUTION INTRAMUSCULAR; INTRAVENOUS; SUBCUTANEOUS AS NEEDED
Status: CANCELLED | OUTPATIENT
Start: 2022-11-07

## 2022-11-07 RX ORDER — OXYCODONE HYDROCHLORIDE 5 MG/1
5 TABLET ORAL
Status: CANCELLED | OUTPATIENT
Start: 2022-11-07

## 2022-11-07 RX ORDER — ROCURONIUM BROMIDE 10 MG/ML
INJECTION, SOLUTION INTRAVENOUS AS NEEDED
Status: DISCONTINUED | OUTPATIENT
Start: 2022-11-07 | End: 2022-11-07 | Stop reason: HOSPADM

## 2022-11-07 RX ORDER — LIDOCAINE HYDROCHLORIDE 10 MG/ML
0.1 INJECTION, SOLUTION EPIDURAL; INFILTRATION; INTRACAUDAL; PERINEURAL AS NEEDED
Status: DISCONTINUED | OUTPATIENT
Start: 2022-11-07 | End: 2022-11-07 | Stop reason: HOSPADM

## 2022-11-07 RX ORDER — MIDAZOLAM HYDROCHLORIDE 1 MG/ML
1 INJECTION, SOLUTION INTRAMUSCULAR; INTRAVENOUS AS NEEDED
Status: DISCONTINUED | OUTPATIENT
Start: 2022-11-07 | End: 2022-11-07 | Stop reason: HOSPADM

## 2022-11-07 RX ORDER — FENTANYL CITRATE 50 UG/ML
25 INJECTION, SOLUTION INTRAMUSCULAR; INTRAVENOUS
Status: DISCONTINUED | OUTPATIENT
Start: 2022-11-07 | End: 2022-11-07 | Stop reason: HOSPADM

## 2022-11-07 RX ORDER — POLYETHYLENE GLYCOL 3350 17 G/17G
17 POWDER, FOR SOLUTION ORAL DAILY
Status: CANCELLED | OUTPATIENT
Start: 2022-11-08

## 2022-11-07 RX ORDER — SUMATRIPTAN 25 MG/1
50 TABLET, FILM COATED ORAL AS NEEDED
Status: CANCELLED | OUTPATIENT
Start: 2022-11-07

## 2022-11-07 RX ORDER — FENTANYL CITRATE 50 UG/ML
INJECTION, SOLUTION INTRAMUSCULAR; INTRAVENOUS AS NEEDED
Status: DISCONTINUED | OUTPATIENT
Start: 2022-11-07 | End: 2022-11-07 | Stop reason: HOSPADM

## 2022-11-07 RX ORDER — AMOXICILLIN 250 MG
1 CAPSULE ORAL 2 TIMES DAILY
Status: CANCELLED | OUTPATIENT
Start: 2022-11-07

## 2022-11-07 RX ORDER — SODIUM CHLORIDE, SODIUM LACTATE, POTASSIUM CHLORIDE, CALCIUM CHLORIDE 600; 310; 30; 20 MG/100ML; MG/100ML; MG/100ML; MG/100ML
50 INJECTION, SOLUTION INTRAVENOUS CONTINUOUS
Status: DISCONTINUED | OUTPATIENT
Start: 2022-11-07 | End: 2022-11-07 | Stop reason: HOSPADM

## 2022-11-07 RX ORDER — VANCOMYCIN HYDROCHLORIDE 1 G/20ML
INJECTION, POWDER, LYOPHILIZED, FOR SOLUTION INTRAVENOUS AS NEEDED
Status: DISCONTINUED | OUTPATIENT
Start: 2022-11-07 | End: 2022-11-07 | Stop reason: HOSPADM

## 2022-11-07 RX ORDER — ACETAMINOPHEN 500 MG
1000 TABLET ORAL EVERY 6 HOURS
Status: CANCELLED | OUTPATIENT
Start: 2022-11-07

## 2022-11-07 RX ORDER — FACIAL-BODY WIPES
10 EACH TOPICAL DAILY PRN
Status: CANCELLED | OUTPATIENT
Start: 2022-11-09

## 2022-11-07 RX ORDER — ONDANSETRON 2 MG/ML
INJECTION INTRAMUSCULAR; INTRAVENOUS AS NEEDED
Status: DISCONTINUED | OUTPATIENT
Start: 2022-11-07 | End: 2022-11-07 | Stop reason: HOSPADM

## 2022-11-07 RX ORDER — SODIUM CHLORIDE 9 MG/ML
125 INJECTION, SOLUTION INTRAVENOUS CONTINUOUS
Status: CANCELLED | OUTPATIENT
Start: 2022-11-07 | End: 2022-11-08

## 2022-11-07 RX ORDER — DEXAMETHASONE SODIUM PHOSPHATE 4 MG/ML
INJECTION, SOLUTION INTRA-ARTICULAR; INTRALESIONAL; INTRAMUSCULAR; INTRAVENOUS; SOFT TISSUE AS NEEDED
Status: DISCONTINUED | OUTPATIENT
Start: 2022-11-07 | End: 2022-11-07 | Stop reason: HOSPADM

## 2022-11-07 RX ORDER — CYCLOBENZAPRINE HCL 10 MG
10 TABLET ORAL
Status: CANCELLED | OUTPATIENT
Start: 2022-11-07

## 2022-11-07 RX ORDER — MIDAZOLAM HYDROCHLORIDE 1 MG/ML
INJECTION, SOLUTION INTRAMUSCULAR; INTRAVENOUS AS NEEDED
Status: DISCONTINUED | OUTPATIENT
Start: 2022-11-07 | End: 2022-11-07 | Stop reason: HOSPADM

## 2022-11-07 RX ORDER — ACETAMINOPHEN 325 MG/1
650 TABLET ORAL ONCE
Status: COMPLETED | OUTPATIENT
Start: 2022-11-07 | End: 2022-11-07

## 2022-11-07 RX ORDER — HYDROMORPHONE HYDROCHLORIDE 1 MG/ML
0.5 INJECTION, SOLUTION INTRAMUSCULAR; INTRAVENOUS; SUBCUTANEOUS
Status: CANCELLED | OUTPATIENT
Start: 2022-11-07 | End: 2022-11-08

## 2022-11-07 RX ADMIN — ONDANSETRON HYDROCHLORIDE 4 MG: 2 INJECTION, SOLUTION INTRAMUSCULAR; INTRAVENOUS at 12:16

## 2022-11-07 RX ADMIN — PROPOFOL 100 MG: 10 INJECTION, EMULSION INTRAVENOUS at 11:22

## 2022-11-07 RX ADMIN — MIDAZOLAM 2 MG: 1 INJECTION INTRAMUSCULAR; INTRAVENOUS at 11:16

## 2022-11-07 RX ADMIN — ACETAMINOPHEN 650 MG: 325 TABLET ORAL at 10:07

## 2022-11-07 RX ADMIN — HYDROMORPHONE HYDROCHLORIDE 0.2 MG: 1 INJECTION, SOLUTION INTRAMUSCULAR; INTRAVENOUS; SUBCUTANEOUS at 13:02

## 2022-11-07 RX ADMIN — SUCCINYLCHOLINE CHLORIDE 100 MG: 20 INJECTION, SOLUTION INTRAMUSCULAR; INTRAVENOUS at 11:22

## 2022-11-07 RX ADMIN — ROCURONIUM BROMIDE 5 MG: 10 SOLUTION INTRAVENOUS at 11:22

## 2022-11-07 RX ADMIN — WATER 2 G: 1 INJECTION INTRAMUSCULAR; INTRAVENOUS; SUBCUTANEOUS at 11:35

## 2022-11-07 RX ADMIN — SODIUM CHLORIDE, SODIUM LACTATE, POTASSIUM CHLORIDE, AND CALCIUM CHLORIDE: 600; 310; 30; 20 INJECTION, SOLUTION INTRAVENOUS at 11:16

## 2022-11-07 RX ADMIN — FENTANYL CITRATE 25 MCG: 50 INJECTION INTRAMUSCULAR; INTRAVENOUS at 11:57

## 2022-11-07 RX ADMIN — FENTANYL CITRATE 50 MCG: 50 INJECTION INTRAMUSCULAR; INTRAVENOUS at 11:22

## 2022-11-07 RX ADMIN — DEXAMETHASONE SODIUM PHOSPHATE 8 MG: 4 INJECTION, SOLUTION INTRAMUSCULAR; INTRAVENOUS at 11:22

## 2022-11-07 RX ADMIN — SODIUM CHLORIDE, POTASSIUM CHLORIDE, SODIUM LACTATE AND CALCIUM CHLORIDE 50 ML/HR: 600; 310; 30; 20 INJECTION, SOLUTION INTRAVENOUS at 10:17

## 2022-11-07 RX ADMIN — KETOROLAC TROMETHAMINE 15 MG: 30 INJECTION, SOLUTION INTRAMUSCULAR; INTRAVENOUS at 12:21

## 2022-11-07 RX ADMIN — LIDOCAINE HYDROCHLORIDE 60 MG: 20 INJECTION, SOLUTION EPIDURAL; INFILTRATION; INTRACAUDAL; PERINEURAL at 11:22

## 2022-11-07 RX ADMIN — FENTANYL CITRATE 25 MCG: 50 INJECTION INTRAMUSCULAR; INTRAVENOUS at 12:25

## 2022-11-07 NOTE — ANESTHESIA PREPROCEDURE EVALUATION
Anesthetic History   No history of anesthetic complications            Review of Systems / Medical History  Patient summary reviewed, nursing notes reviewed and pertinent labs reviewed    Pulmonary  Within defined limits        Undiagnosed apnea         Neuro/Psych   Within defined limits           Cardiovascular  Within defined limits                     GI/Hepatic/Renal  Within defined limits              Endo/Other        Arthritis     Other Findings              Physical Exam    Airway  Mallampati: I  TM Distance: 4 - 6 cm  Neck ROM: normal range of motion   Mouth opening: Normal     Cardiovascular  Regular rate and rhythm,  S1 and S2 normal,  no murmur, click, rub, or gallop             Dental  No notable dental hx       Pulmonary  Breath sounds clear to auscultation               Abdominal  GI exam deferred       Other Findings            Anesthetic Plan    ASA: 2  Anesthesia type: general          Induction: Intravenous  Anesthetic plan and risks discussed with: Patient

## 2022-11-07 NOTE — BRIEF OP NOTE
Brief Postoperative Note    Patient: Lawrence Nj  YOB: 1955  MRN: [id number]    Date of Procedure: 11/7/2022     Pre-Op Diagnosis: S/P LUMBAR FUSION  CHRONIC BILATERAL LOW BACK WITH BILATERAL SCIATICA  IMPLANTATION OF BATTERY    Post-Op Diagnosis: Same as preoperative diagnosis. Procedure(s):  DORSAL COLUMN STIMULATOR PLACEMENT    Surgeon(s):  Janice Barrett MD    Surgical Assistant: Physician Assistant: CHERELLE Herrera    Anesthesia: General     Estimated Blood Loss (mL): Minimal    Complications: None    Specimens: * No specimens in log *     Implants:   Implant Name Type Inv.  Item Serial No.  Lot No. LRB No. Used Action   LEAD NEUROSTIMULATOR L50CM SURG COVEREDGE - [id number]  LEAD NEUROSTIMULATOR L50CM SURG COVEREDGE [id number] [employer] N/A N/A 1 Implanted   GRAFT HUM TISS 3X4 CM WND COVERING [facility] - [id number]  GRAFT HUM TISS 3X4 CM WND COVERING [facility] MEDICAL AND SURGICAL [facility] [id number] [employer] N/A N/A 1 Implanted   STIMULATOR WAVEWRITER ALPHA IMPL PULSE GENRTR KIT - [id number] [facility] KIT [id number] [employer]WD [id number] N/A 1 Implanted       Drains: * No LDAs found *    Findings: DCS placement    Electronically Signed by CHERELLE Small on 11/7/2022 at 12:38 PM

## 2022-11-07 NOTE — DISCHARGE INSTRUCTIONS
[facility] Dr. Josee Vance  [phone]    After [address]:  Discharge Instructions Dorsal Column Stimulator Surgery     Patient Name: Curtis Milner    Date of procedure: 11/7/2022  Date of discharge: 11/7/2022    Procedure: Procedure(s):  [address]  PCP: @PCP@    Follow up appointments  -Follow up with Dr. Josee Vance in 2 weeks. Call [phone] to make an appointment as soon as you get home from the hospital.    [facility] _________________________   phone: ___________________  The agency will contact you to arrange dates/times for visits. Please call them if you do not hear from them within 24 hours after you are discharged  Physical therapy 3 times a week for 3 weeks  Nursing-initial assessment and as needed    When to call your Orthopaedic Surgeon:  -Signs of infection-if your incision is red; continues to have drainage; drainage has a foul odor or if you have a persistent fever over 101 degrees for 24 hours  -Nausea or vomiting, severe headache  -Loss of bowel or bladder function, inability to urinate  -Changes in sensation in your arms or legs (numbness, tingling, loss of color)  -Increased weakness-greater than before your surgery  -Severe pain or pain not relieved by medications  -Signs of a blood clot in your leg-calf pain, tenderness, redness, swelling of lower leg    When to call your Primary Care Physician:  -Concerns about medical conditions such as diabetes, high blood pressure, asthma, congestive heart failure  -Call if blood sugars are elevated, persistent headache or dizziness, coughing or congestion, constipation or diarrhea, burning with urination, abnormal heart rate    When to call 911 and go to the nearest emergency room:  -Acute onset of chest pain, shortness of breath, difficulty breathing    Activity  - You are going home a well person, be as active as possible. Your only exercise should be walking.   Start with short frequent walks and increase your walking distance each day.  -Limit the amount of time you sit to 20-30 minute intervals. Sitting for prolonged periods of time will be uncomfortable for you following surgery.  -Do NOT lift anything over 5 pounds  -Do NOT do any straining, twisting or bending  -When you are in bed, you may lay on your back or on either side. Do NOT lie on your stomach    Brace  -If you have a back brace, you should wear your brace at all times when you are out of bed. Do not wear the brace while in bed or showering.  -Remember to always wear a cotton t-shirt underneath your brace.  -Do not bend or twist when your brace is off    Diet  -Resume usual diet; drink plenty of fluids; eat foods high in fiber  -It is important to have regular bowel movements. Pain medications may cause constipation. You may want to take a stool softener (such as Senokot-S or Colace) to prevent constipation.  -If constipation occurs, take a laxative (such as Dulcolax tablets, Milk of Magnesia, or a suppository). Laxatives should only be used if the above preventable measures have failed and you still have not had a bowel movement after three days. Driving  -You may not drive or return to work until instructed by your physician. However, you may ride in the car for short periods of time. Incision Care  Your incision has been closed with absorbable sutures and steri-strips. This will assist with healing. The steri-strips to remain on your incision for 2 weeks. A dry dressing (ABD and tape) will be placed over it and should be changed daily, for at least the first several days after your surgery. If you have no incisional drainage, you may leave the incision open to air if you wish, still leaving the steri-strips in place. Please make sure to wash your hands prior to touching your dressing. You may take brief showers but do not run the water directly onto the wound.  After your shower, blot your incision dry with a soft towel and replace the dry dressing. Do not allow the tape to come in contact with the steri-strips. Do not rub or apply any lotions or ointments to your incision site. Do not soak or scrub your wound. The steri-strips will be removed during your two week follow-up appointment. If you experience drainage leaking from underneath the steri-strips or if it peels off before 2 weeks, please contact your orthopedic surgeons office. Showering  -You may shower in approximately 4 days after your surgery.    -Leave the dressing on during your shower. Do NOT allow the water to run directly onto your dressing. Once you get out of the shower, gently pat the dressing dry. -Reminder- your brace can be removed while showering. Remember to not bend or twist while your brace is off.    -Do not take a tub bath. Preventing blood clots  -You have been given T.E.D. stockings to wear. Continue to wear these for 7 days after your discharge. Put them on in the morning and take them off at night.    -They are used to increase your circulation and prevent blood clots from forming in your legs  -T. E.D. stockings can be machine washed, temperature not to exceed 160° F (71°C) and machine dried for 15 to 20 minutes, temperature not to exceed 250° F (121°C). Pain management  -Take pain medication as prescribed; decrease the amount you use as your pain lessens  -Do not wait until you are in extreme pain to take your medication.  -Avoid alcoholic beverages while taking pain medication    Pain Medication Safety  DO:  -Read the Medication Guide   -Take your medicine exactly as prescribed   -Store your medicine away from children and in a safe place   -Flush unused medicine down the toilet   -Call your healthcare provider for medical advice about side effects. You may report side effects to FDA at 6-399-FDA-4787.   -Please be aware that many medications contain Tylenol.   We do not want you to over medicate so please read the information below as a guide. Do not take more than 4 Grams of Tylenol in a 24 hour period. (There are 1000 milligrams in one Gram)                                                                                                                                                                                                                           Percocet contains 325 mg of Tylenol per tablet (do not take more than 12 tablets in 24 hours)  Lortab contains 500 mg of Tylenol per tablet (do not take more than 8 tablets in 24 hours)  Norco contains 325 mg of Tylenol per tablet (do not take more than 12 tablets in 24 hours). DO NOT:  -Do not give your medicine to others   -Do not take medicine unless it was prescribed for you   -Do not stop taking your medicine without talking to your healthcare provider   -Do not break, chew, crush, dissolve, or inject your medicine. If you cannot  swallow your medicine whole, talk to your healthcare provider.  -Do not drink alcohol while taking this medicine  -Do not take anti-inflammatory medications or aspirin unless instructed by your physician.

## 2022-11-07 NOTE — PERIOP NOTES
SURGIFOAM WAS GIVEN TO THE STERILE FIELD TO BE USED BY MD DURING PROCEDURE  REF: [id number]  LOT: [id number]  EXP: 10-

## 2022-11-07 NOTE — ANESTHESIA POSTPROCEDURE EVALUATION
Post-Anesthesia Evaluation and Assessment    Patient: Tate Wells MRN: [id number]  SSN: [id number]    YOB: 1955  Age: 78 y.o. Sex: female      I have evaluated the patient and they are stable and ready for discharge from the PACU. Cardiovascular Function/Vital Signs  Visit Vitals  /75 (BP 1 Location: Right arm, BP Patient Position: At rest)   Pulse 72   Temp 36.7 °C (98 °F)   Resp 8   Ht 5' (1.524 m)   Wt 45.9 kg (101 lb 3.1 oz)   SpO2 98%   BMI 19.76 kg/m²       Patient is status post General anesthesia for Procedure(s):  DORSAL COLUMN STIMULATOR PLACEMENT. Nausea/Vomiting: None    Postoperative hydration reviewed and adequate. Pain:  Pain Scale 1: Numeric (0 - 10) (11/07/22 1345)  Pain Intensity 1: 2 (11/07/22 1345)   Managed    Neurological Status:   Neuro (WDL): Within Defined Limits (11/07/22 1345)   At baseline    Mental Status, Level of Consciousness: Alert and  oriented to person, place, and time    Pulmonary Status:   O2 Device: None (Room air) (11/07/22 1345)   Adequate oxygenation and airway patent    Complications related to anesthesia: None    Post-anesthesia assessment completed.  No concerns    Signed By: Jerod Puga MD     November 7, 2022

## 2022-11-08 NOTE — OP NOTES
[address]   OPERATIVE REPORT    Name:  Drew James  MR#:  [id number]  :  1955  ACCOUNT #:  [de-identified]  DATE OF SERVICE:  2022    PREOPERATIVE DIAGNOSIS:  Chronic low back pain, status post L3-S1 fusion. POSTOPERATIVE DIAGNOSIS:  Chronic low back pain, status post L3-S1 fusion. PROCEDURE PERFORMED:  Thoracic laminectomy and dorsal column stimulator paddle lead placement and IPG unit placement. SURGEON:  Rosemary Munoz MD    ASSISTANT:  Chino Mccurdy PA-C. ANESTHESIA:  General    COMPLICATIONS:  None. SPECIMENS REMOVED:  None    IMPLANTS:  Include [employer] dorsal column stimulator paddle lead and IPG unit. ESTIMATED BLOOD LOSS:  Minimal.    INDICATIONS:  This is a 78-year-old female with a history of chronic back pain, status post previous lumbar fusion from L3-S1, now with progressive discomfort and pain management has done a spinal cord stimulator trial with good relief of symptoms. Given her young age and activity level, we are trying to avoid extension of her fusion to the thoracolumbar junction. She is for spinal cord stimulator placement. PROCEDURE:  The patient was identified, brought to the operative suite, underwent general anesthesia without difficulty. Perioperative antibiotics were placed, baselines obtained and these remained stable throughout the surgical procedure. The patient was placed prone on the Gregg frame with all bony prominences well padded. Back was prepped and draped sterilely, at which time the  x-ray identified the T12 level and carried out from there and found T8-T9 interval.  Sharp incision was made directly over this. Dissection was carried down to thoracodorsal fascia in subperiosteal dissection undercutting the lamina of T8 for central laminectomy, which allowed us to continue with further access in the epidural space.   Hemostasis with bipolar cautery and FloSeal.  We carefully dissected the subperiosteal space and then placed the paddle lead from [employer] proximally in a posterior epidural fashion. X-ray was used to guide this proximally on the T7 level with good coverage of that level. Neuromonitoring remained stable. We then made the IPG unit pocket on the right hand side. With the sharp incision, used the Bovie cautery for hemostasis as well as deeply in the pocket. We then used a subcutaneous tunneler to access from the IPG and pocket to this thoracic incision. Prolene was used anchor two of the four suture leads to clear the spinous process T9 and then the paddle leads were then placed through the subcutaneous tunneler through the IPG pocket attached to the IPG unit. Impendences were tested and were found to be stable and reasonable. Wounds were irrigated. Vancomycin powder in the deep wound. A #1 Stratafix on the fascial layers, 2-0 Stratafix on the subcutaneous layer, and 3-0 Monocryl on the skin. The patient returned to the PACU in stable condition. The physician assistant was present during the entire operative procedure and assisted in all critical elements of the surgery. No surgical assist or resident was available.       Shona Bass MD      [id number]  D:  11/07/2022 12:37  T:  11/08/2022 4:15  JOB #:  [id number]

## 2022-11-14 NOTE — OP NOTES
[address]   OPERATIVE REPORT    Name:  Tori Pascual  MR#:  [id number]  :  1955  ACCOUNT #:  [de-identified]  DATE OF SERVICE:  2022    ADDENDUM    Alina Phelan PA-C, was present for the entirety of the procedure and vital for the performance of the procedure. Alina Phelan PA-C, assisted with positioning, prepping, and draping of the patient before the procedure and instrument manipulation. Alina Phelan PA-C, was also assistant and was present for wound closure, dressing application, and brace application after the procedure. Please note that no intern, resident, or other hospital staff was available to assist during the surgery. Also note that she was instrumental in placement of both the dorsal column paddle lead as well as the IPG unit.         Surendra Woods MD      [id number]  D:  2022 10:59  T:  2022 17:36  JOB #:  [id number]

## 2022-11-15 ENCOUNTER — TELEPHONE (OUTPATIENT)
Dept: ORTHOPEDIC SURGERY | Age: 67
End: 2022-11-15

## 2022-11-15 NOTE — TELEPHONE ENCOUNTER
SX:11/8/2022 IPG Unit Placement. Patient report increase pain and swelling since yesterday,Denied fever,chills,headache, redness around incision. No draining from incision.

## 2022-11-16 DIAGNOSIS — G89.29 CHRONIC BILATERAL LOW BACK PAIN WITH BILATERAL SCIATICA: ICD-10-CM

## 2022-11-16 DIAGNOSIS — M54.42 CHRONIC BILATERAL LOW BACK PAIN WITH BILATERAL SCIATICA: ICD-10-CM

## 2022-11-16 DIAGNOSIS — Z98.1 S/P LUMBAR FUSION: Primary | ICD-10-CM

## 2022-11-16 DIAGNOSIS — M54.41 CHRONIC BILATERAL LOW BACK PAIN WITH BILATERAL SCIATICA: ICD-10-CM

## 2022-11-17 DIAGNOSIS — Z98.1 S/P LUMBAR FUSION: Primary | ICD-10-CM

## 2022-11-17 RX ORDER — OXYCODONE HYDROCHLORIDE 5 MG/1
5 TABLET ORAL
Qty: 28 TABLET | Refills: 0 | Status: SHIPPED | OUTPATIENT
Start: 2022-11-17 | End: 2022-11-24

## 2022-11-17 NOTE — TELEPHONE ENCOUNTER
11/7/2022 Thoracic laminectomy and dorsal column stimulator paddle lead placement and IPG unit placement.      TAKING OXYCODONE 5MG @4-6 HRS PRN

## 2022-11-18 RX ORDER — CYCLOBENZAPRINE HCL 10 MG
TABLET ORAL
Qty: 60 TABLET | Refills: 0 | Status: SHIPPED | OUTPATIENT
Start: 2022-11-18

## 2022-11-21 ENCOUNTER — OFFICE VISIT (OUTPATIENT)
Dept: ORTHOPEDIC SURGERY | Age: 67
End: 2022-11-21
Payer: COMMERCIAL

## 2022-11-21 VITALS — WEIGHT: 101 LBS | HEIGHT: 60 IN | BODY MASS INDEX: 19.83 KG/M2

## 2022-11-21 DIAGNOSIS — Z98.1 S/P LUMBAR FUSION: Primary | ICD-10-CM

## 2022-11-21 PROCEDURE — 99024 POSTOP FOLLOW-UP VISIT: CPT | Performed by: PHYSICIAN ASSISTANT

## 2022-11-21 NOTE — LETTER
11/21/2022 11:14 AM    Ms. Nicholas Gordon  [address] [facility] [id number]      Ms. Pimentel has been under the care of Dr. Tammy Norris for a surgery performed on 11/7/22. She is cleared to return to work with restrictions on December 5th. The restrictions include no lifting greater than 20 lbs, no pushing or pulling greater than 25 lbs. She will follow up with us in 4 weeks for reevaluation.         Sincerely,      Jordan Go PA-C

## 2022-11-21 NOTE — PROGRESS NOTES
1. Have you been to the ER, urgent care clinic since your last visit? Hospitalized since your last visit? No    2. Have you seen or consulted any other health care providers outside of the [address] [place] [address] since your last visit? Include any pap smears or colon screening.  No    Chief Complaint   Patient presents with    Surgical Follow-up     Sx 11/07/22 Dorsal Column Stimulator Placement (PO#1)

## 2022-11-22 NOTE — PROGRESS NOTES
Corrinne Daub (: 1955) is a 78 y.o. female patient here for evaluation of the following chief complaint(s):  Surgical Follow-up (Sx 22 Dorsal Column Stimulator Placement (PO#1))         ASSESSMENT/PLAN:  Below is the assessment and plan developed based on review of pertinent history, physical exam, labs, studies, and medications. 1. S/P lumbar fusion  -     XR SPINE THORACOLUMB JUNCTION MIN 2 V; Future      67-year-old female 2-week status post thoracic laminectomy, DCS placement, IPG placement. I am happy with her progress so far following this procedure. Her x-rays were reviewed with her in detail today and her questions were answered to her satisfaction. A representative from [employer] was present at the encounter today to set up her stimulator. Her incisions appeared clean, dry, and intact. I did give her a work note today to return to work with restrictions in 2 weeks. She will follow-up with us in 4 weeks for reevaluation. Return in about 4 weeks (around 2022). SUBJECTIVE/OBJECTIVE:  Corrinne Daub (: 1955) is a 78 y.o. female who presents today for the following:  Chief Complaint   Patient presents with    Surgical Follow-up     Sx 22 Dorsal Column Stimulator Placement (PO#1)        HPI   67-year-old female presenting 2 weeks status post thoracic laminectomy, DCS placement, IPG placement. She states he is overall doing well following this procedure. Her pain has been tolerable. She states she does still have some pain into her left leg. She denies any numbness or tingling or any weakness in her legs. She denies any change in bowel or bladder control. She denies any fever, chills, wound dehiscence or drainage.     IMAGING:  XR Results (most recent):  Results from Appointment encounter on 22    XR SPINE THORACOLUMB JUNCTION MIN 2 V    Narrative  AP, lateral films of the thoracolumbar spine reveal no evidence of acute fracture or lytic lesion. L3-S1 hardware well-positioned and intact. Spinal stimulator battery located over right buttock and paddle well-positioned and thoracic spine. MRI Results (most recent):  Results from Appointment encounter on 09/14/22    MRI CERV SPINE WO CONT    Narrative  EXAM:  MRI CERV SPINE WO CONT    INDICATION:   Neck pain. COMPARISON: Cervical spine radiograph 9/7/2022. TECHNIQUE: Multiplanar multisequence acquisition without contrast of the  cervical spine. CONTRAST: None. FINDINGS:  Grade 1 anterolisthesis of C4 on C5 measuring 2 mm. Mild retrolisthesis of C6 on  C7. Vertebral body heights are maintained without evidence of acute fracture. Marrow signal is normal. Multilevel degenerative disc disease and facet  arthropathy as detailed below. The cervical cord is normal in size and signal.  Region of the foramen magnum is unremarkable. Visualized soft tissues are  unremarkable. C2-C3: Mild right facet arthropathy. No significant disc herniation, spinal  canal or neural foraminal stenosis. C3-C4: Mild left facet arthropathy. No significant disc herniation, spinal canal  or neural foraminal stenosis. C4-C5: Grade 1 anterolisthesis with uncovering of the disc. Severe left facet  arthropathy. No significant spinal canal or neural foraminal stenosis. C5-C6: Diffuse disc osteophyte complex with bilateral uncovertebral spurring. Mild left facet arthropathy. Mild spinal canal stenosis. Mild bilateral neural  foraminal stenosis. C6-C7: Diffuse disc osteophyte complex with bilateral uncovertebral spurring,  right worse than left. Mild spinal canal stenosis. Moderate right and no left  neural foraminal stenosis. C7-T1: Minimal diffuse disc bulge. Severe left facet arthropathy. No significant  spinal canal or neural foraminal stenosis. Impression  1. Mild spinal canal stenosis and moderate right neural foraminal stenosis at  C6-C7.   2. Mild spinal canal stenosis and mild bilateral neural foraminal stenosis at  C5-C6. 3. Remaining degenerative changes as detailed above. Grade 1 anterolisthesis of  C4 on C5. No Known Allergies    Current Outpatient Medications   Medication Sig    cyclobenzaprine (FLEXERIL) 10 mg tablet TAKE 1 TABLET BY MOUTH THREE TIMES A DAY AS NEEDED    oxyCODONE IR (ROXICODONE) 5 mg immediate release tablet Take 1 Tablet by mouth every six (6) hours as needed for Pain for up to 7 days. Max Daily Amount: 20 mg.    naproxen (NAPROSYN) 500 mg tablet Take 500 mg by mouth two (2) times daily as needed for Pain. SUMAtriptan (IMITREX) 50 mg tablet Take 50 mg by mouth as needed for Migraine. fremanezumab-vfrm ([employer] Autoinjector) 225 mg/1.5 mL auto-injector 225 mg by SubCUTAneous route once. topiramate (TOPAMAX) 200 mg tablet Take 200 mg by mouth daily. naloxone (Narcan) 4 mg/actuation nasal spray Use 1 spray intranasally, then discard. Repeat with new spray every 2 min as needed for opioid overdose symptoms, alternating nostrils. Indications: decrease in rate & depth of breathing due to opioid drug, opioid overdose    furosemide (LASIX) 20 mg tablet Take 20 mg by mouth daily. No current facility-administered medications for this visit. Past Medical History:   Diagnosis Date    Arthritis     Cancer ([facility] [address]     BCC FOREHEAD, LEFT ARM    Chronic pain     Migraine     LAST HEADACHE 10-30-22    Other ill-defined conditions(799.89)     MIGRAINES        Past Surgical History:   Procedure Laterality Date    HX GI      COLONOSCOPY MULTIPLE. HX GI      ENDOSCOPY    HX GYN       X 1    HX HYSTERECTOMY      HX ORTHOPAEDIC      Spinal fusion at L4-L5 X 3    HX OTHER SURGICAL      DIAGNOSTIC EXPLORATORY SURGERIES MULTIPLE.     HX WISDOM TEETH EXTRACTION       X 4    IR INJ FORAMIN EPID LUMB ANES/STER SNGL  2020    NEUROLOGICAL PROCEDURE UNLISTED      LUMBAR FUSION WITH RODS & SCREWS       Family History   Problem Relation Age of Onset    Cancer Mother COLON CA    Other Neg Hx         UNKNOWN FAMILY HX        Social History     Tobacco Use    Smoking status: Never    Smokeless tobacco: Never   Substance Use Topics    Alcohol use: No        Review of Systems     No flowsheet data found. Vitals:  Ht 5' (1.524 m)   Wt 101 lb (45.8 kg)   BMI 19.73 kg/m²    Body mass index is 19.73 kg/m². Physical Exam    Integumentary  Assessment of Surgical Incision - healing and consistent with normal anticipated wound healing. Neurologic  Sensory  Light Touch - Intact - Globally. Overall Assessment of Muscle Strength and Tone reveals  Lower Extremities - Right Iliopsoas - 5/5. Left Iliopsoas - 5/5. Right Tibialis Anterior - 5/5. Left Tibialis Anterior - 5/5. Right Gastroc-Soleus - 5/5. Left Gastroc-Soleus - 5/5. Right EHL - 5/5. Left EHL - 5/5. General Assessment of Reflexes  Right Ankle - Clonus is not present. Left Ankle - Clonus is not present. Reflexes (Dermatomes)  2/2 Normal - Left Achilles (L5-S2), Left Knee (L2-4), Right Achilles (L5-S2) and Right Knee (L2-4). Musculoskeletal  Global Assessment  Examination of related systems reveals - well-developed, well-nourished, in no acute distress, alert and oriented x 3 and normal coordination. Spine/Ribs/Pelvis  Lumbosacral Spine - Examination of the lumbosacral spine reveals - no known fractures or deformities. Inspection and Palpation - Tenderness - moderate. Assessment of pain reveals the following findings - The pain is characterized as - moderate. Location - pain refers to lower back bilaterally. Dr. Shell Rubi was available for immediate consult during this encounter. An electronic signature was used to authenticate this note.   -- Malcom Urban PA-C

## 2022-12-05 ENCOUNTER — PATIENT MESSAGE (OUTPATIENT)
Dept: ORTHOPEDIC SURGERY | Age: 67
End: 2022-12-05

## 2022-12-05 RX ORDER — CYCLOBENZAPRINE HCL 10 MG
TABLET ORAL
Qty: 60 TABLET | Refills: 0 | Status: SHIPPED | OUTPATIENT
Start: 2022-12-05

## 2022-12-08 RX ORDER — TRAMADOL HYDROCHLORIDE 50 MG/1
50 TABLET ORAL
Qty: 30 TABLET | Refills: 0 | Status: SHIPPED | OUTPATIENT
Start: 2022-12-08 | End: 2022-12-11

## 2022-12-08 NOTE — TELEPHONE ENCOUNTER
C/o pain. Need pain medications. She has been working since Dec 7.2022.   11/8/2022 Thoracic laminectomy and dorsal column stimulator paddle lead placement and IPG unit placement

## 2022-12-20 ENCOUNTER — OFFICE VISIT (OUTPATIENT)
Dept: ORTHOPEDIC SURGERY | Age: 67
End: 2022-12-20
Payer: COMMERCIAL

## 2022-12-20 VITALS — BODY MASS INDEX: 19.83 KG/M2 | WEIGHT: 101 LBS | HEIGHT: 60 IN

## 2022-12-20 DIAGNOSIS — Z98.1 S/P LUMBAR FUSION: Primary | ICD-10-CM

## 2022-12-20 DIAGNOSIS — M54.41 CHRONIC BILATERAL LOW BACK PAIN WITH BILATERAL SCIATICA: ICD-10-CM

## 2022-12-20 DIAGNOSIS — M54.2 ACUTE NECK PAIN: ICD-10-CM

## 2022-12-20 DIAGNOSIS — M54.42 CHRONIC BILATERAL LOW BACK PAIN WITH BILATERAL SCIATICA: ICD-10-CM

## 2022-12-20 DIAGNOSIS — G89.29 CHRONIC BILATERAL LOW BACK PAIN WITH BILATERAL SCIATICA: ICD-10-CM

## 2022-12-20 PROCEDURE — 99024 POSTOP FOLLOW-UP VISIT: CPT | Performed by: ORTHOPAEDIC SURGERY

## 2022-12-20 RX ORDER — HYDROCODONE BITARTRATE AND ACETAMINOPHEN 5; 325 MG/1; MG/1
1 TABLET ORAL
Qty: 40 TABLET | Refills: 0 | Status: SHIPPED | OUTPATIENT
Start: 2022-12-20 | End: 2023-01-19

## 2022-12-20 NOTE — PROGRESS NOTES
Ramesh Becerra (: 1955) is a 78 y.o. female, patient, here for evaluation of the following chief complaint(s):  Surgical Follow-up (Sx 22 DCS Placement (PO#2))       ASSESSMENT/PLAN:    Below is the assessment and plan developed based on review of pertinent history, physical exam, labs, studies, and medications. She does have some fluctuance at the inferior portion of her thoracic wound. Given the acute change in her symptoms over the last 4 days I like to get some lab work as well as an MRI to rule out any collections. I will give her a little bit of pain medication in the interim. We will contact her once we have the results of that test.  We will get a CBC and sed rate and a C-reactive protein. 1. S/P lumbar fusion  2. Chronic bilateral low back pain with bilateral sciatica  3. Acute neck pain    No follow-ups on file. SUBJECTIVE/OBJECTIVE:  Ramesh Becerra (: 1955) is a 78 y.o. female. No flowsheet data found. She was initially doing well after placement of her stimulator. In the last 4 days she has developed a lot of discomfort at the inferior portion of her wound with sensitivity. She also has sensitivity along the right flank towards the IPG unit. She denies any fevers or chills or bowel bladder difficulties. Imaging:    XR Results (most recent):  Results from Appointment encounter on 22    XR SPINE THORACOLUMB JUNCTION MIN 2 V    Narrative  AP, lateral films of the thoracolumbar spine reveal no evidence of acute fracture or lytic lesion. L3-S1 hardware well-positioned and intact. Spinal stimulator battery located over right buttock and paddle well-positioned and thoracic spine.          MRI Results (most recent):    No recent results      No Known Allergies    Current Outpatient Medications   Medication Sig    cyclobenzaprine (FLEXERIL) 10 mg tablet TAKE 1 TABLET BY MOUTH THREE TIMES A DAY AS NEEDED    naproxen (NAPROSYN) 500 mg tablet Take 500 mg by mouth two (2) times daily as needed for Pain. SUMAtriptan (IMITREX) 50 mg tablet Take 50 mg by mouth as needed for Migraine. fremanezumab-vfrm (Ajovy Autoinjector) 225 mg/1.5 mL auto-injector 225 mg by SubCUTAneous route once. topiramate (TOPAMAX) 200 mg tablet Take 200 mg by mouth daily. furosemide (LASIX) 20 mg tablet Take 20 mg by mouth daily. No current facility-administered medications for this visit. Past Medical History:   Diagnosis Date    Arthritis     Cancer ([facility] [address]     BCC FOREHEAD, LEFT ARM    Chronic pain     Migraine     LAST HEADACHE 10-30-22    Other ill-defined conditions(799.89)     MIGRAINES        Past Surgical History:   Procedure Laterality Date    HX GI      COLONOSCOPY MULTIPLE. HX GI      ENDOSCOPY    HX GYN       X 1    HX HYSTERECTOMY      HX ORTHOPAEDIC      Spinal fusion at L4-L5 X 3    HX OTHER SURGICAL      DIAGNOSTIC EXPLORATORY SURGERIES MULTIPLE. HX WISDOM TEETH EXTRACTION       X 4    IR INJ FORAMIN EPID LUMB ANES/STER SNGL  2020    NEUROLOGICAL PROCEDURE UNLISTED      LUMBAR FUSION WITH RODS & SCREWS       Family History   Problem Relation Age of Onset    Cancer Mother         COLON CA    Other Neg Hx         UNKNOWN FAMILY HX        Social History     Tobacco Use    Smoking status: Never    Smokeless tobacco: Never   Vaping Use    Vaping Use: Never used   Substance Use Topics    Alcohol use: No    Drug use: No        Review of Systems       Vitals:  Ht 5' (1.524 m)   Wt 101 lb (45.8 kg)   BMI 19.73 kg/m²    Body mass index is 19.73 kg/m². Ortho Exam       Integumentary  Assessment of Surgical Incision - healing and consistent with normal anticipated wound healing. Some granulation in the inferior portion of her thoracic wound with some mild fluctuance in the inferior portion. No obvious erythema. Tenderness on palpation of the lateral flank. Neurologic  Sensory  Light Touch - Intact - Globally.   Overall Assessment of Muscle Strength and Tone reveals  Lower Extremities - Right Iliopsoas - 5/5. Left Iliopsoas - 5/5. Right Tibialis Anterior - 5/5. Left Tibialis Anterior - 5/5. Right Gastroc-Soleus - 5/5. Left Gastroc-Soleus - 5/5. Right EHL - 5/5. Left EHL - 5/5. General Assessment of Reflexes  Right Ankle - Clonus is not present. Left Ankle - Clonus is not present. Reflexes (Dermatomes)  2/2 Normal - Left Achilles (L5-S2), Left Knee (L2-4), Right Achilles (L5-S2) and Right Knee (L2-4). Musculoskeletal  Global Assessment  Examination of related systems reveals - well-developed, well-nourished, in no acute distress, alert and oriented x 3 and normal coordination. Spine/Ribs/Pelvis  Lumbosacral Spine - Examination of the lumbosacral spine reveals - no known fractures or deformities. Inspection and Palpation - Tenderness - moderate. Assessment of pain reveals the following findings - The pain is characterized as - moderate. Location - pain refers to lower back bilaterally. An electronic signature was used to authenticate this note.   -- Denys Strong MD

## 2022-12-20 NOTE — PATIENT INSTRUCTIONS
Spondylolysis and Spondylolisthesis: Exercises  Introduction  Here are some examples of exercises for you to try. The exercises may be suggested for a condition or for rehabilitation. Start each exercise slowly. Ease off the exercises if you start to have pain. You will be told when to start these exercises and which ones will work best for you. How to do the exercises  Single knee-to-chest  Lie on your back with your knees bent and your feet flat on the floor. You can put a small pillow under your head and neck if it is more comfortable. Bring one knee to your chest, keeping the other foot flat on the floor. Keep your lower back pressed to the floor. Hold for 15 to 30 seconds. Relax, and lower the knee to the starting position. Repeat with the other leg. Repeat 2 to 4 times with each leg. To get more stretch, put your other leg flat on the floor while pulling your knee to your chest.  Double knee-to-chest  Lie on your back with your knees bent and your feet flat on the floor. You can put a small pillow under your head and neck if it is more comfortable. Bring both knees to your chest.  Keep your lower back pressed to the floor. Hold for 15 to 30 seconds. Relax, and lower your knees to the starting position. Repeat 2 to 4 times. Alternate arm and leg (bird dog) exercise  Do this exercise slowly. Try to keep your body straight at all times. Start on the floor, on your hands and knees. Tighten your belly muscles by pulling your belly button in toward your spine. Be sure you continue to breathe normally and do not hold your breath. Raise one arm off the floor, and hold it straight out in front of you. Be careful not to let your shoulder drop down, because that will twist your trunk. Hold for about 6 seconds, then lower your arm and switch to your other arm. Repeat 8 to 12 times on each arm. When you can do this exercise with ease and no pain, repeat steps 1 through 5.  But this time do it with one leg raised off the floor, holding your leg straight out behind you. Be careful not to let your hip drop down, because that will twist your trunk. When holding your leg straight out becomes easier, try raising your opposite arm at the same time, and repeat steps 1 through 5. Bridging  Lie on your back with both knees bent. Your knees should be bent about 90 degrees. Then push your feet into the floor, squeeze your buttocks, and lift your hips off the floor until your shoulders, hips, and knees are all in a straight line. Hold for about 6 seconds as you continue to breathe normally, and then slowly lower your hips back down to the floor and rest for up to 10 seconds. Repeat 8 to 12 times. Curl-ups  Lie on the floor on your back with your knees bent at a 90-degree angle. Your feet should be flat on the floor, about 12 inches from your buttocks. Cross your arms over your chest. If this bothers your neck, try putting your hands behind your neck (not your head), with your elbows spread apart. Slowly tighten your belly muscles and raise your shoulder blades off the floor. Keep your head in line with your body, and do not press your chin to your chest.  Hold this position for 1 or 2 seconds, then slowly lower yourself back down to the floor. Repeat 8 to 12 times. Plank  Do this exercise slowly. Try to keep your body straight at all times, and do not let one hip drop lower than the other. Lie on your stomach, resting your upper body on your forearms. Tighten your belly muscles by pulling your belly button in toward your spine. Keeping your knees on the floor, press down with your forearms to lift your upper body off the floor. Hold for about 6 seconds, then lower your body to the floor. Rest for up to 10 seconds. Repeat 8 to 12 times. Over time, work up to holding for 15 to 30 seconds each time.   If this exercise is easy to do with your knees on the floor, try doing this exercise with your knees and legs straight, supported by your toes on the floor. Follow-up care is a key part of your treatment and safety. Be sure to make and go to all appointments, and call your doctor if you are having problems. It's also a good idea to know your test results and keep a list of the medicines you take. Current as of: March 9, 2022               Content Version: 13.4  © 2006-2022 Healthwise, [employer]. Care instructions adapted under license by [employer] (which disclaims liability or warranty for this information). If you have questions about a medical condition or this instruction, always ask your healthcare professional. [place] [address] any warranty or liability for your use of this information.

## 2022-12-20 NOTE — LETTER
12/20/2022    Patient: Hughie Mcardle   YOB: 1955   Date of Visit: 12/20/2022     Blanca Best MD  [address] [address]  Via Fax: [phone]    Dear Blanca Best MD,      Thank you for referring Ms. Florinda Zelaya to [facility] for evaluation. My notes for this consultation are attached. If you have questions, please do not hesitate to call me. I look forward to following your patient along with you.       Sincerely,    Farzaneh Gonzalez MD

## 2022-12-27 ENCOUNTER — TELEPHONE (OUTPATIENT)
Dept: ORTHOPEDIC SURGERY | Age: 67
End: 2022-12-27

## 2022-12-27 NOTE — TELEPHONE ENCOUNTER
Patient is calling for her Lab results, she has not had the MRI at this point. She is working on a sooner date than 3 weeks out at [facility]. I have copied her labs from [employer] site. Please review and advise. Jigna Caldera RN         Haylee Romulocorinna  : 1955  Gender: Female  Patient ID1:  Status: Final  Order Details    Accession #: [id number]  Control #1: [id number]  Collection Date: 2022  Reported Date: 2022  Account #: [de-identified]    Ordering Provider: Solitario Flanagan  Diagnosis Codes:   G89.29; M54.2; M54.41; M54.42; Z98.1    Ordered Tests:     CBC With Differential/Platelet; Sedimentation Rate-Westergren; [more]  Results  Number Selected: 0  CBC With Differential/Platelet (#[id number])  Test Results Flag Units Reference Interval  WBC   6.7  x10E3/uL 3.4 - 10.8  RBC   3.99  x10E6/uL 3.77 - 5.28  Hemoglobin   11.9  g/dL 11.1 - 15.9  Hematocrit   35.4  % 34.0 - 46.6  MCV   89  fL 79 - 97  MCH   29.8  pg 26.6 - 33.0  MCHC   33.6  g/dL 31.5 - 35.7  RDW   12.4  % 11.7 - 15.4  Platelets   877  N60D8/ - 450  Neutrophils   68  %   Not Estab. Lymphs   20  %   Not Estab. Monocytes   10  %   Not Estab. Eos   1  %   Not Estab. Basos   1  %   Not Estab. Neutrophils (Absolute)   4.6  x10E3/uL 1.4 - 7.0  Lymphs (Absolute)   1.3  x10E3/uL 0.7 - 3.1  Monocytes(Absolute)   0.7  x10E3/uL 0.1 - 0.9  Eos (Absolute)   0.0  x10E3/uL 0.0 - 0.4  Baso (Absolute)   0.0  x10E3/uL 0.0 - 0.2  Immature Granulocytes   0  %   Not Estab.   Immature Grans (Abs)   0.0  x10E3/uL 0.0 - 0.1    Sedimentation Rate-Westergren (#[id number])  Test Results Flag Units Reference Interval  Sedimentation Rate-Westergren   22  mm/hr 0 - 40    C-Reactive Protein, Quant (#[id number])  Test Results Flag Units Reference Interval  C-Reactive Protein, Quant   77  High mg/L 0 - 10

## 2022-12-28 ENCOUNTER — TRANSCRIBE ORDER (OUTPATIENT)
Dept: SCHEDULING | Age: 67
End: 2022-12-28

## 2022-12-28 DIAGNOSIS — M54.42 ACUTE BACK PAIN WITH SCIATICA, LEFT: ICD-10-CM

## 2022-12-28 DIAGNOSIS — M54.50 LOW BACK PAIN, UNSPECIFIED BACK PAIN LATERALITY, UNSPECIFIED CHRONICITY, UNSPECIFIED WHETHER SCIATICA PRESENT: ICD-10-CM

## 2022-12-28 DIAGNOSIS — M54.41 CHRONIC BILATERAL LOW BACK PAIN WITH BILATERAL SCIATICA: ICD-10-CM

## 2022-12-28 DIAGNOSIS — M54.42 CHRONIC BILATERAL LOW BACK PAIN WITH BILATERAL SCIATICA: ICD-10-CM

## 2022-12-28 DIAGNOSIS — Z98.1 ARTHRODESIS STATUS: Primary | ICD-10-CM

## 2022-12-28 DIAGNOSIS — G89.29 CHRONIC BILATERAL LOW BACK PAIN WITH BILATERAL SCIATICA: ICD-10-CM

## 2022-12-28 DIAGNOSIS — M48.062 SPINAL STENOSIS OF LUMBAR REGION WITH NEUROGENIC CLAUDICATION: Primary | ICD-10-CM

## 2022-12-28 RX ORDER — CEPHALEXIN 500 MG/1
500 CAPSULE ORAL 4 TIMES DAILY
Qty: 40 CAPSULE | Refills: 0 | Status: SHIPPED | OUTPATIENT
Start: 2022-12-28

## 2022-12-29 ENCOUNTER — HOSPITAL ENCOUNTER (OUTPATIENT)
Dept: MRI IMAGING | Age: 67
Discharge: HOME | End: 2022-12-29
Attending: ORTHOPAEDIC SURGERY
Payer: COMMERCIAL

## 2022-12-29 DIAGNOSIS — Z98.1 ARTHRODESIS STATUS: ICD-10-CM

## 2022-12-29 DIAGNOSIS — M54.41 CHRONIC BILATERAL LOW BACK PAIN WITH BILATERAL SCIATICA: ICD-10-CM

## 2022-12-29 DIAGNOSIS — M54.42 CHRONIC BILATERAL LOW BACK PAIN WITH BILATERAL SCIATICA: ICD-10-CM

## 2022-12-29 DIAGNOSIS — M54.42 ACUTE BACK PAIN WITH SCIATICA, LEFT: ICD-10-CM

## 2022-12-29 DIAGNOSIS — M48.062 SPINAL STENOSIS OF LUMBAR REGION WITH NEUROGENIC CLAUDICATION: ICD-10-CM

## 2022-12-29 DIAGNOSIS — M54.50 LOW BACK PAIN, UNSPECIFIED BACK PAIN LATERALITY, UNSPECIFIED CHRONICITY, UNSPECIFIED WHETHER SCIATICA PRESENT: ICD-10-CM

## 2022-12-29 DIAGNOSIS — G89.29 CHRONIC BILATERAL LOW BACK PAIN WITH BILATERAL SCIATICA: ICD-10-CM

## 2022-12-29 PROCEDURE — 72146 MRI CHEST SPINE W/O DYE: CPT

## 2023-01-05 ENCOUNTER — OFFICE VISIT (OUTPATIENT)
Dept: ORTHOPEDIC SURGERY | Age: 68
End: 2023-01-05
Payer: COMMERCIAL

## 2023-01-05 VITALS — BODY MASS INDEX: 19.83 KG/M2 | WEIGHT: 101 LBS | HEIGHT: 60 IN

## 2023-01-05 DIAGNOSIS — M54.41 CHRONIC BILATERAL LOW BACK PAIN WITH BILATERAL SCIATICA: ICD-10-CM

## 2023-01-05 DIAGNOSIS — G89.29 CHRONIC BILATERAL LOW BACK PAIN WITH BILATERAL SCIATICA: ICD-10-CM

## 2023-01-05 DIAGNOSIS — M54.42 CHRONIC BILATERAL LOW BACK PAIN WITH BILATERAL SCIATICA: ICD-10-CM

## 2023-01-05 DIAGNOSIS — Z98.1 S/P LUMBAR FUSION: ICD-10-CM

## 2023-01-05 DIAGNOSIS — M54.2 ACUTE NECK PAIN: ICD-10-CM

## 2023-01-05 RX ORDER — HYDROCODONE BITARTRATE AND ACETAMINOPHEN 5; 325 MG/1; MG/1
1 TABLET ORAL
Qty: 40 TABLET | Refills: 0 | Status: SHIPPED | OUTPATIENT
Start: 2023-01-05 | End: 2023-02-04

## 2023-01-05 NOTE — PROGRESS NOTES
Claudetta Cannon (: 1955) is a 78 y.o. female, patient, here for evaluation of the following chief complaint(s):  Back Pain (Thoracic MRI Results 22)       ASSESSMENT/PLAN:    Below is the assessment and plan developed based on review of pertinent history, physical exam, labs, studies, and medications. The thoracic wound has improved significantly in the last 5 days since she started the antibiotics. The lab work was reasonably normal and the MRI of the thoracic spine did not show any fluid collections. I would like you to continue the antibiotics for a minimum of 2 weeks. I refilled her pain medication but of asked her to continue to start weaning that. I would like to do another wound check in 2 weeks. 1. S/P lumbar fusion  -     HYDROcodone-acetaminophen (Norco) 5-325 mg per tablet; Take 1 Tablet by mouth every eight (8) hours as needed for Pain for up to 30 days. Max Daily Amount: 3 Tablets., Normal, Disp-40 Tablet, R-0  2. Chronic bilateral low back pain with bilateral sciatica  -     HYDROcodone-acetaminophen (Norco) 5-325 mg per tablet; Take 1 Tablet by mouth every eight (8) hours as needed for Pain for up to 30 days. Max Daily Amount: 3 Tablets., Normal, Disp-40 Tablet, R-0  3. Acute neck pain  -     HYDROcodone-acetaminophen (Norco) 5-325 mg per tablet; Take 1 Tablet by mouth every eight (8) hours as needed for Pain for up to 30 days. Max Daily Amount: 3 Tablets., Normal, Disp-40 Tablet, R-0    No follow-ups on file. SUBJECTIVE/OBJECTIVE:  Claudetta Cannon (: 1955) is a 78 y.o. female. No flowsheet data found. She was initially doing well after placement of her stimulator. She still has had pain along the tract of the spinal cord stimulator but it has improved in the last week since she started her antibiotics. The thoracic wound is improving nicely. She denies any fevers or chills.     Imaging:    XR Results (most recent):  Results from Appointment encounter on 22    XR SPINE THORACOLUMB JUNCTION MIN 2 V    Narrative  AP, lateral films of the thoracolumbar spine reveal no evidence of acute fracture or lytic lesion. L3-S1 hardware well-positioned and intact. Spinal stimulator battery located over right buttock and paddle well-positioned and thoracic spine. MRI Results (most recent):    No recent results      No Known Allergies    Current Outpatient Medications   Medication Sig    HYDROcodone-acetaminophen (Norco) 5-325 mg per tablet Take 1 Tablet by mouth every eight (8) hours as needed for Pain for up to 30 days. Max Daily Amount: 3 Tablets. cephALEXin (KEFLEX) 500 mg capsule Take 1 Capsule by mouth four (4) times daily. cyclobenzaprine (FLEXERIL) 10 mg tablet TAKE 1 TABLET BY MOUTH THREE TIMES A DAY AS NEEDED    naproxen (NAPROSYN) 500 mg tablet Take 500 mg by mouth two (2) times daily as needed for Pain. SUMAtriptan (IMITREX) 50 mg tablet Take 50 mg by mouth as needed for Migraine. fremanezumab-vfrm (Ajovy Autoinjector) 225 mg/1.5 mL auto-injector 225 mg by SubCUTAneous route once. topiramate (TOPAMAX) 200 mg tablet Take 200 mg by mouth daily. furosemide (LASIX) 20 mg tablet Take 20 mg by mouth daily. No current facility-administered medications for this visit. Past Medical History:   Diagnosis Date    Arthritis     Cancer ([facility] [address]     BCC FOREHEAD, LEFT ARM    Chronic pain     Migraine     LAST HEADACHE 10-30-22    Other ill-defined conditions(799.89)     MIGRAINES        Past Surgical History:   Procedure Laterality Date    HX GI      COLONOSCOPY MULTIPLE. HX GI      ENDOSCOPY    HX GYN       X 1    HX HYSTERECTOMY      HX ORTHOPAEDIC      Spinal fusion at L4-L5 X 3    HX OTHER SURGICAL      DIAGNOSTIC EXPLORATORY SURGERIES MULTIPLE.     HX WISDOM TEETH EXTRACTION       X 4    IR INJ FORAMIN EPID LUMB ANES/STER SNGL  2020    IN UNLISTED NEUROLOGICAL/NEUROMUSCULAR DX PX      LUMBAR FUSION WITH RODS & SCREWS       Family History   Problem Relation Age of Onset    Cancer Mother         COLON CA    Other Neg Hx         UNKNOWN FAMILY HX        Social History     Tobacco Use    Smoking status: Never    Smokeless tobacco: Never   Vaping Use    Vaping Use: Never used   Substance Use Topics    Alcohol use: No    Drug use: No        Review of Systems   Musculoskeletal:  Positive for back pain. All other systems reviewed and are negative. Vitals:  Ht 5' (1.524 m)   Wt 101 lb (45.8 kg)   BMI 19.73 kg/m²    Body mass index is 19.73 kg/m². Ortho Exam       Integumentary  Assessment of Surgical Incision - healing and consistent with normal anticipated wound healing. Some granulation in the inferior portion of her thoracic wound with some mild fluctuance in the inferior portion. No obvious erythema. Tenderness on palpation of the lateral flank. Neurologic  Sensory  Light Touch - Intact - Globally. Overall Assessment of Muscle Strength and Tone reveals  Lower Extremities - Right Iliopsoas - 5/5. Left Iliopsoas - 5/5. Right Tibialis Anterior - 5/5. Left Tibialis Anterior - 5/5. Right Gastroc-Soleus - 5/5. Left Gastroc-Soleus - 5/5. Right EHL - 5/5. Left EHL - 5/5. General Assessment of Reflexes  Right Ankle - Clonus is not present. Left Ankle - Clonus is not present. Reflexes (Dermatomes)  2/2 Normal - Left Achilles (L5-S2), Left Knee (L2-4), Right Achilles (L5-S2) and Right Knee (L2-4). Musculoskeletal  Global Assessment  Examination of related systems reveals - well-developed, well-nourished, in no acute distress, alert and oriented x 3 and normal coordination. Spine/Ribs/Pelvis  Lumbosacral Spine - Examination of the lumbosacral spine reveals - no known fractures or deformities. Inspection and Palpation - Tenderness - moderate. Assessment of pain reveals the following findings - The pain is characterized as - moderate. Location - pain refers to lower back bilaterally.        An electronic signature was used to authenticate this note.   -- Whitney Yang MD

## 2023-01-05 NOTE — PROGRESS NOTES
1. Have you been to the ER, urgent care clinic since your last visit? Hospitalized since your last visit? No    2. Have you seen or consulted any other health care providers outside of the [address] [place] [address] since your last visit? Include any pap smears or colon screening.  No    Chief Complaint   Patient presents with    Back Pain     Thoracic MRI Results 12/29/22

## 2023-01-05 NOTE — PATIENT INSTRUCTIONS
Spondylolysis and Spondylolisthesis: Exercises  Introduction  Here are some examples of exercises for you to try. The exercises may be suggested for a condition or for rehabilitation. Start each exercise slowly. Ease off the exercises if you start to have pain. You will be told when to start these exercises and which ones will work best for you. How to do the exercises  Single knee-to-chest  Lie on your back with your knees bent and your feet flat on the floor. You can put a small pillow under your head and neck if it is more comfortable. Bring one knee to your chest, keeping the other foot flat on the floor. Keep your lower back pressed to the floor. Hold for 15 to 30 seconds. Relax, and lower the knee to the starting position. Repeat with the other leg. Repeat 2 to 4 times with each leg. To get more stretch, put your other leg flat on the floor while pulling your knee to your chest.  Double knee-to-chest  Lie on your back with your knees bent and your feet flat on the floor. You can put a small pillow under your head and neck if it is more comfortable. Bring both knees to your chest.  Keep your lower back pressed to the floor. Hold for 15 to 30 seconds. Relax, and lower your knees to the starting position. Repeat 2 to 4 times. Alternate arm and leg (bird dog) exercise  Do this exercise slowly. Try to keep your body straight at all times. Start on the floor, on your hands and knees. Tighten your belly muscles by pulling your belly button in toward your spine. Be sure you continue to breathe normally and do not hold your breath. Raise one arm off the floor, and hold it straight out in front of you. Be careful not to let your shoulder drop down, because that will twist your trunk. Hold for about 6 seconds, then lower your arm and switch to your other arm. Repeat 8 to 12 times on each arm. When you can do this exercise with ease and no pain, repeat steps 1 through 5.  But this time do it with one leg raised off the floor, holding your leg straight out behind you. Be careful not to let your hip drop down, because that will twist your trunk. When holding your leg straight out becomes easier, try raising your opposite arm at the same time, and repeat steps 1 through 5. Bridging  Lie on your back with both knees bent. Your knees should be bent about 90 degrees. Then push your feet into the floor, squeeze your buttocks, and lift your hips off the floor until your shoulders, hips, and knees are all in a straight line. Hold for about 6 seconds as you continue to breathe normally, and then slowly lower your hips back down to the floor and rest for up to 10 seconds. Repeat 8 to 12 times. Curl-ups  Lie on the floor on your back with your knees bent at a 90-degree angle. Your feet should be flat on the floor, about 12 inches from your buttocks. Cross your arms over your chest. If this bothers your neck, try putting your hands behind your neck (not your head), with your elbows spread apart. Slowly tighten your belly muscles and raise your shoulder blades off the floor. Keep your head in line with your body, and do not press your chin to your chest.  Hold this position for 1 or 2 seconds, then slowly lower yourself back down to the floor. Repeat 8 to 12 times. Plank  Do this exercise slowly. Try to keep your body straight at all times, and do not let one hip drop lower than the other. Lie on your stomach, resting your upper body on your forearms. Tighten your belly muscles by pulling your belly button in toward your spine. Keeping your knees on the floor, press down with your forearms to lift your upper body off the floor. Hold for about 6 seconds, then lower your body to the floor. Rest for up to 10 seconds. Repeat 8 to 12 times. Over time, work up to holding for 15 to 30 seconds each time.   If this exercise is easy to do with your knees on the floor, try doing this exercise with your knees and legs straight, supported by your toes on the floor. Follow-up care is a key part of your treatment and safety. Be sure to make and go to all appointments, and call your doctor if you are having problems. It's also a good idea to know your test results and keep a list of the medicines you take. Current as of: March 9, 2022               Content Version: 13.4  © 2006-2022 Healthwise, [employer]. Care instructions adapted under license by [employer] (which disclaims liability or warranty for this information). If you have questions about a medical condition or this instruction, always ask your healthcare professional. [place] [address] any warranty or liability for your use of this information.

## 2023-01-08 RX ORDER — CYCLOBENZAPRINE HCL 10 MG
TABLET ORAL
Qty: 60 TABLET | Refills: 0 | Status: SHIPPED | OUTPATIENT
Start: 2023-01-08

## 2023-01-08 RX ORDER — CEPHALEXIN 500 MG/1
500 CAPSULE ORAL 4 TIMES DAILY
Qty: 40 CAPSULE | Refills: 0 | Status: CANCELLED | OUTPATIENT
Start: 2023-01-08

## 2023-01-09 ENCOUNTER — TELEPHONE (OUTPATIENT)
Dept: ORTHOPEDIC SURGERY | Age: 68
End: 2023-01-09

## 2023-01-09 NOTE — TELEPHONE ENCOUNTER
Patient is calling to report that she is draining and the incision is swollen, she states this is the same as her visit on Friday. She reports new pain of stinging in the incision, I explained ill let you know but she should rest and ice.

## 2023-01-10 RX ORDER — CEPHALEXIN 500 MG/1
500 CAPSULE ORAL 4 TIMES DAILY
Qty: 40 CAPSULE | Refills: 0 | Status: SHIPPED | OUTPATIENT
Start: 2023-01-10 | End: 2023-01-10

## 2023-01-10 RX ORDER — CEPHALEXIN 500 MG/1
500 CAPSULE ORAL 4 TIMES DAILY
Qty: 40 CAPSULE | Refills: 0 | Status: SHIPPED | OUTPATIENT
Start: 2023-01-10

## 2023-01-12 ENCOUNTER — OFFICE VISIT (OUTPATIENT)
Dept: ORTHOPEDIC SURGERY | Age: 68
End: 2023-01-12
Payer: COMMERCIAL

## 2023-01-12 VITALS — HEIGHT: 60 IN | BODY MASS INDEX: 19.83 KG/M2 | WEIGHT: 101 LBS

## 2023-01-12 DIAGNOSIS — Z96.89 S/P INSERTION OF SPINAL CORD STIMULATOR: ICD-10-CM

## 2023-01-12 DIAGNOSIS — L24.A9 WOUND DRAINAGE: Primary | ICD-10-CM

## 2023-01-12 NOTE — PROGRESS NOTES
1. Have you been to the ER, urgent care clinic since your last visit? Hospitalized since your last visit? No    2. Have you seen or consulted any other health care providers outside of the [address] [place] [address] since your last visit? Include any pap smears or colon screening.  No    Chief Complaint   Patient presents with    Surgical Follow-up     Wound Check

## 2023-01-12 NOTE — PROGRESS NOTES
Nic Ospina (: 1955) is a 78 y.o. female, patient, here for evaluation of the following chief complaint(s):  Surgical Follow-up (Wound Check)       ASSESSMENT/PLAN:  Below is the assessment and plan developed based on review of pertinent history, physical exam, labs, studies, and medications. Patient presents today for evaluation of worsening wound drainage over the last few days. At this point, she is a candidate for washout of her thoracic incision. She will continue with antibiotics and warm compresses through the weekends. We discussed red flag symptoms to watch out for, and when to go to the emergency room. Patient verbalized understanding. She will continue with daily wound dressing changes. Discussed importance of keeping incision clean and dry. She will be scheduled for the washout next week. We did discuss that the spinal cord stimulator may have to be removed. Procedure: Thoracic irrigation debridement with possible DCS removal      The risks and benefits were discussed at length with the patient and the patient has elected to proceed. Indications for surgery include failed conservative treatment. Alternative treatments, risks and the perioperative course were discussed with the patient. All questions were answered. The risks and benefits of the procedure were explained. Benefits include definitive diagnosis, relief of pain, elimination of deformity and improved function. Risks of surgery including bleeding, infection, weakness, numbness, CSF leak, failure to improve symptoms, exacerbation of medical co-morbidities and even death were discussed with the patient. 1. Wound drainage  2. S/P insertion of spinal cord stimulator    No follow-ups on file. SUBJECTIVE/OBJECTIVE:  Nic Ospina (: 1955) is a 78 y.o. female. No flowsheet data found. 79-year-old female presents today for evaluation of wound drainage.   She is status post spinal cord stimulator placement on 11/7/2022. She was last seen last week, and at that point her thoracic wound had improved. She recently had a thoracic spine MRI which did not show any fluid collections. Today, she reports worsening purulent drainage from her thoracic incision, with associated burning and tenderness. She reports some chills over the last few days as well as a fever of 101 yesterday. She has been on antibiotics. Imaging:    XR Results (most recent):  Results from Appointment encounter on 11/21/22    XR SPINE THORACOLUMB JUNCTION MIN 2 V    Narrative  AP, lateral films of the thoracolumbar spine reveal no evidence of acute fracture or lytic lesion. L3-S1 hardware well-positioned and intact. Spinal stimulator battery located over right buttock and paddle well-positioned and thoracic spine. MRI Results (most recent):  Results from [place] encounter on 12/29/22    MRI [facility] SPINE WO CONT    Narrative  EXAM: MRI [facility] SPINE WO CONT    INDICATION: Thoracic back pain with neurogenic claudication. Previous Lumbar  spine decompression surgery. COMPARISON: Thoracolumbar spine views on 11/21/2022    TECHNIQUE: MR imaging of the thoracic spine was performed using the following  sequences: sagittal T1, T2, stir; axial T1, T2. Examination presented for my  interpretation at 1340 hours. CONTRAST: None. FINDINGS:    Right curvature of the cervical spine is partially imaged. Subtle leftward  curvature of the upper thoracic spine. No subluxation. Vertebral body heights  are maintained. Marrow signal is normal. The discs are within normal limits. Chronic Schmorl's nodes on both sides of T11-T12 disc. The course, caliber, and signal intensity of the spinal cord are within normal  limits. Central canal of the spinal cord measures upper normal 2 mm in maximum  diameter at the T7 level. Spinal stimulator terminates at the T6 level. Mild atrophy of the paraspinal musculature.     There is no herniation or stenosis. Impression  Within normal limits given the presence of a spinal cord stimulator. No Known Allergies    Current Outpatient Medications   Medication Sig    cephALEXin (KEFLEX) 500 mg capsule TAKE 1 CAPSULE BY MOUTH FOUR (4) TIMES DAILY. cyclobenzaprine (FLEXERIL) 10 mg tablet TAKE 1 TABLET BY MOUTH THREE TIMES A DAY AS NEEDED    HYDROcodone-acetaminophen (Norco) 5-325 mg per tablet Take 1 Tablet by mouth every eight (8) hours as needed for Pain for up to 30 days. Max Daily Amount: 3 Tablets. naproxen (NAPROSYN) 500 mg tablet Take 500 mg by mouth two (2) times daily as needed for Pain. SUMAtriptan (IMITREX) 50 mg tablet Take 50 mg by mouth as needed for Migraine. fremanezumab-vfrm (Ajovy Autoinjector) 225 mg/1.5 mL auto-injector 225 mg by SubCUTAneous route once. topiramate (TOPAMAX) 200 mg tablet Take 200 mg by mouth daily. furosemide (LASIX) 20 mg tablet Take 20 mg by mouth daily. No current facility-administered medications for this visit. Past Medical History:   Diagnosis Date    Arthritis     Cancer ([facility] [address]     BCC FOREHEAD, LEFT ARM    Chronic pain     Migraine     LAST HEADACHE 10-30-22    Other ill-defined conditions(799.89)     MIGRAINES        Past Surgical History:   Procedure Laterality Date    HX GI      COLONOSCOPY MULTIPLE. HX GI      ENDOSCOPY    HX GYN       X 1    HX HYSTERECTOMY      HX ORTHOPAEDIC      Spinal fusion at L4-L5 X 3    HX OTHER SURGICAL      DIAGNOSTIC EXPLORATORY SURGERIES MULTIPLE.     HX WISDOM TEETH EXTRACTION       X 4    IR INJ FORAMIN EPID LUMB ANES/STER SNGL  2020    MT UNLISTED NEUROLOGICAL/NEUROMUSCULAR DX PX      LUMBAR FUSION WITH RODS & SCREWS       Family History   Problem Relation Age of Onset    Cancer Mother         COLON CA    Other Neg Hx         UNKNOWN FAMILY HX        Social History     Tobacco Use    Smoking status: Never    Smokeless tobacco: Never   Vaping Use    Vaping Use: Never used Substance Use Topics    Alcohol use: No    Drug use: No        Review of Systems       Vitals:  Ht 5' (1.524 m)   Wt 101 lb (45.8 kg)   BMI 19.73 kg/m²    Body mass index is 19.73 kg/m². Physical Exam  Integumentary  Assessment of Surgical Incision -thoracic incision with some granulation at the inferior portion of incision, with active purulent drainage. Tender to palpation. No significant erythema. Healing and consistent with normal anticipated wound healing. Neurologic  Sensory  Light Touch - Intact - Globally. Overall Assessment of Muscle Strength and Tone reveals  Lower Extremities - Right Iliopsoas - 5/5. Left Iliopsoas - 5/5. Right Tibialis Anterior - 5/5. Left Tibialis Anterior - 5/5. Right Gastroc-Soleus - 5/5. Left Gastroc-Soleus - 5/5. Right EHL - 5/5. Left EHL - 5/5. General Assessment of Reflexes  Right Ankle - Clonus is not present. Left Ankle - Clonus is not present. Reflexes (Dermatomes)  2/2 Normal - Left Achilles (L5-S2), Left Knee (L2-4), Right Achilles (L5-S2) and Right Knee (L2-4). Musculoskeletal  Global Assessment  Examination of related systems reveals - well-developed, well-nourished, in no acute distress, alert and oriented x 3 and normal coordination. c  Spine/Ribs/Pelvis  Lumbosacral Spine - Examination of the lumbosacral spine reveals - no known fractures or deformities. Inspection and Palpation - Tenderness along the inferior portion of the thoracic wound. Assessment of pain reveals the following findings - The pain is characterized as - moderate. Location - pain refers to lower back bilaterally. An electronic signature was used to authenticate this note.   -- Yandy Haider MD

## 2023-01-16 ENCOUNTER — ANESTHESIA EVENT (OUTPATIENT)
Dept: SURGERY | Age: 68
End: 2023-01-16
Payer: COMMERCIAL

## 2023-01-16 NOTE — PERIOP NOTES
Phone interview completed with patient. Pre-op instructions reviewed and discussed. Medications reviewed and instructions given on when/if to stop/take prior to surgery. Opportunity given for patient to ask questions, and questions answered. Patient instructed to purchase CHG soap or Hibiclens OTC and follow directions as listed; advised to use night before surgery and day of surgery. INSTRUCTED TO BRING COVID CARD DAY OF SURGERY.

## 2023-01-17 ENCOUNTER — ANESTHESIA (OUTPATIENT)
Dept: SURGERY | Age: 68
End: 2023-01-17
Payer: COMMERCIAL

## 2023-01-17 ENCOUNTER — HOSPITAL ENCOUNTER (OUTPATIENT)
Age: 68
Discharge: HOME | End: 2023-01-18
Attending: ORTHOPAEDIC SURGERY | Admitting: ORTHOPAEDIC SURGERY
Payer: COMMERCIAL

## 2023-01-17 DIAGNOSIS — M54.42 CHRONIC BILATERAL LOW BACK PAIN WITH BILATERAL SCIATICA: ICD-10-CM

## 2023-01-17 DIAGNOSIS — G89.29 CHRONIC BILATERAL LOW BACK PAIN WITH BILATERAL SCIATICA: ICD-10-CM

## 2023-01-17 DIAGNOSIS — L24.A9 WOUND DRAINAGE: Primary | ICD-10-CM

## 2023-01-17 DIAGNOSIS — Z98.890 S/P SPINAL SURGERY: ICD-10-CM

## 2023-01-17 DIAGNOSIS — M54.2 ACUTE NECK PAIN: ICD-10-CM

## 2023-01-17 DIAGNOSIS — M54.41 CHRONIC BILATERAL LOW BACK PAIN WITH BILATERAL SCIATICA: ICD-10-CM

## 2023-01-17 DIAGNOSIS — Z98.1 S/P LUMBAR FUSION: ICD-10-CM

## 2023-01-17 PROCEDURE — 87075 CULTR BACTERIA EXCEPT BLOOD: CPT

## 2023-01-17 PROCEDURE — 87205 SMEAR GRAM STAIN: CPT

## 2023-01-17 PROCEDURE — 74011250637 HC RX REV CODE- 250/637: Performed by: STUDENT IN AN ORGANIZED HEALTH CARE EDUCATION/TRAINING PROGRAM

## 2023-01-17 PROCEDURE — 87186 SC STD MICRODIL/AGAR DIL: CPT

## 2023-01-17 PROCEDURE — 77030026438 HC STYL ET INTUB CARD -A: Performed by: ANESTHESIOLOGY

## 2023-01-17 PROCEDURE — 76210000000 HC OR PH I REC 2 TO 2.5 HR: Performed by: ORTHOPAEDIC SURGERY

## 2023-01-17 PROCEDURE — 76010000149 HC OR TIME 1 TO 1.5 HR: Performed by: ORTHOPAEDIC SURGERY

## 2023-01-17 PROCEDURE — 77030038692 HC WND DEB SYS IRMX -B: Performed by: ORTHOPAEDIC SURGERY

## 2023-01-17 PROCEDURE — 77030035236 HC SUT PDS STRATFX BARB J&J -B: Performed by: ORTHOPAEDIC SURGERY

## 2023-01-17 PROCEDURE — 76060000034 HC ANESTHESIA 1.5 TO 2 HR: Performed by: ORTHOPAEDIC SURGERY

## 2023-01-17 PROCEDURE — 77030040361 HC SLV COMPR DVT MDII -B

## 2023-01-17 PROCEDURE — 10180 I&D COMPLEX PO WOUND INFCTJ: CPT | Performed by: ORTHOPAEDIC SURGERY

## 2023-01-17 PROCEDURE — 74011250636 HC RX REV CODE- 250/636: Performed by: ORTHOPAEDIC SURGERY

## 2023-01-17 PROCEDURE — 77030013079 HC BLNKT BAIR HGGR 3M -A: Performed by: ANESTHESIOLOGY

## 2023-01-17 PROCEDURE — 77030002916 HC SUT ETHLN J&J -A: Performed by: ORTHOPAEDIC SURGERY

## 2023-01-17 PROCEDURE — 74011250636 HC RX REV CODE- 250/636: Performed by: ANESTHESIOLOGY

## 2023-01-17 PROCEDURE — 74011250637 HC RX REV CODE- 250/637: Performed by: ANESTHESIOLOGY

## 2023-01-17 PROCEDURE — 74011250636 HC RX REV CODE- 250/636: Performed by: STUDENT IN AN ORGANIZED HEALTH CARE EDUCATION/TRAINING PROGRAM

## 2023-01-17 PROCEDURE — 87077 CULTURE AEROBIC IDENTIFY: CPT

## 2023-01-17 PROCEDURE — 74011250636 HC RX REV CODE- 250/636: Performed by: NURSE ANESTHETIST, CERTIFIED REGISTERED

## 2023-01-17 PROCEDURE — 74011000250 HC RX REV CODE- 250: Performed by: NURSE ANESTHETIST, CERTIFIED REGISTERED

## 2023-01-17 PROCEDURE — 74011000250 HC RX REV CODE- 250: Performed by: STUDENT IN AN ORGANIZED HEALTH CARE EDUCATION/TRAINING PROGRAM

## 2023-01-17 PROCEDURE — 2709999900 HC NON-CHARGEABLE SUPPLY: Performed by: ORTHOPAEDIC SURGERY

## 2023-01-17 PROCEDURE — 77030008684 HC TU ET CUF COVD -B: Performed by: ANESTHESIOLOGY

## 2023-01-17 RX ORDER — ROPIVACAINE HYDROCHLORIDE 5 MG/ML
30 INJECTION, SOLUTION EPIDURAL; INFILTRATION; PERINEURAL AS NEEDED
Status: DISCONTINUED | OUTPATIENT
Start: 2023-01-17 | End: 2023-01-17 | Stop reason: HOSPADM

## 2023-01-17 RX ORDER — OXYCODONE HYDROCHLORIDE 5 MG/1
10 TABLET ORAL
Status: DISCONTINUED | OUTPATIENT
Start: 2023-01-17 | End: 2023-01-18 | Stop reason: HOSPADM

## 2023-01-17 RX ORDER — MIDAZOLAM HYDROCHLORIDE 1 MG/ML
1 INJECTION, SOLUTION INTRAMUSCULAR; INTRAVENOUS AS NEEDED
Status: DISCONTINUED | OUTPATIENT
Start: 2023-01-17 | End: 2023-01-17 | Stop reason: HOSPADM

## 2023-01-17 RX ORDER — SODIUM CHLORIDE 0.9 % (FLUSH) 0.9 %
5-40 SYRINGE (ML) INJECTION AS NEEDED
Status: DISCONTINUED | OUTPATIENT
Start: 2023-01-17 | End: 2023-01-18 | Stop reason: HOSPADM

## 2023-01-17 RX ORDER — MORPHINE SULFATE 2 MG/ML
2 INJECTION, SOLUTION INTRAMUSCULAR; INTRAVENOUS
Status: DISCONTINUED | OUTPATIENT
Start: 2023-01-17 | End: 2023-01-18 | Stop reason: HOSPADM

## 2023-01-17 RX ORDER — ONDANSETRON 2 MG/ML
INJECTION INTRAMUSCULAR; INTRAVENOUS AS NEEDED
Status: DISCONTINUED | OUTPATIENT
Start: 2023-01-17 | End: 2023-01-17 | Stop reason: HOSPADM

## 2023-01-17 RX ORDER — SODIUM CHLORIDE 9 MG/ML
125 INJECTION, SOLUTION INTRAVENOUS CONTINUOUS
Status: DISCONTINUED | OUTPATIENT
Start: 2023-01-17 | End: 2023-01-18 | Stop reason: HOSPADM

## 2023-01-17 RX ORDER — MORPHINE SULFATE 2 MG/ML
2 INJECTION, SOLUTION INTRAMUSCULAR; INTRAVENOUS
Status: DISCONTINUED | OUTPATIENT
Start: 2023-01-17 | End: 2023-01-17 | Stop reason: HOSPADM

## 2023-01-17 RX ORDER — CYCLOBENZAPRINE HCL 10 MG
10 TABLET ORAL
Status: DISCONTINUED | OUTPATIENT
Start: 2023-01-17 | End: 2023-01-18 | Stop reason: HOSPADM

## 2023-01-17 RX ORDER — SODIUM CHLORIDE 9 MG/ML
25 INJECTION, SOLUTION INTRAVENOUS CONTINUOUS
Status: DISCONTINUED | OUTPATIENT
Start: 2023-01-17 | End: 2023-01-17 | Stop reason: HOSPADM

## 2023-01-17 RX ORDER — CEPHALEXIN 500 MG/1
500 CAPSULE ORAL 4 TIMES DAILY
Status: DISCONTINUED | OUTPATIENT
Start: 2023-01-17 | End: 2023-01-18 | Stop reason: HOSPADM

## 2023-01-17 RX ORDER — TOPIRAMATE 100 MG/1
200 TABLET, FILM COATED ORAL DAILY
Status: DISCONTINUED | OUTPATIENT
Start: 2023-01-18 | End: 2023-01-18 | Stop reason: HOSPADM

## 2023-01-17 RX ORDER — VANCOMYCIN HYDROCHLORIDE 1 G/20ML
INJECTION, POWDER, LYOPHILIZED, FOR SOLUTION INTRAVENOUS AS NEEDED
Status: DISCONTINUED | OUTPATIENT
Start: 2023-01-17 | End: 2023-01-17 | Stop reason: HOSPADM

## 2023-01-17 RX ORDER — DEXMEDETOMIDINE HYDROCHLORIDE 100 UG/ML
INJECTION, SOLUTION INTRAVENOUS AS NEEDED
Status: DISCONTINUED | OUTPATIENT
Start: 2023-01-17 | End: 2023-01-17 | Stop reason: HOSPADM

## 2023-01-17 RX ORDER — FENTANYL CITRATE 50 UG/ML
50 INJECTION, SOLUTION INTRAMUSCULAR; INTRAVENOUS AS NEEDED
Status: DISCONTINUED | OUTPATIENT
Start: 2023-01-17 | End: 2023-01-17 | Stop reason: HOSPADM

## 2023-01-17 RX ORDER — FUROSEMIDE 20 MG/1
20 TABLET ORAL AS NEEDED
Status: DISCONTINUED | OUTPATIENT
Start: 2023-01-17 | End: 2023-01-18 | Stop reason: HOSPADM

## 2023-01-17 RX ORDER — OXYCODONE HYDROCHLORIDE 5 MG/1
5 TABLET ORAL
Status: DISCONTINUED | OUTPATIENT
Start: 2023-01-17 | End: 2023-01-18 | Stop reason: HOSPADM

## 2023-01-17 RX ORDER — POLYETHYLENE GLYCOL 3350 17 G/17G
17 POWDER, FOR SOLUTION ORAL DAILY
Status: DISCONTINUED | OUTPATIENT
Start: 2023-01-18 | End: 2023-01-18 | Stop reason: HOSPADM

## 2023-01-17 RX ORDER — SUMATRIPTAN 25 MG/1
50 TABLET, FILM COATED ORAL AS NEEDED
Status: DISCONTINUED | OUTPATIENT
Start: 2023-01-17 | End: 2023-01-18 | Stop reason: HOSPADM

## 2023-01-17 RX ORDER — HYDROCODONE BITARTRATE AND ACETAMINOPHEN 5; 325 MG/1; MG/1
1 TABLET ORAL
Qty: 50 TABLET | Refills: 0 | Status: SHIPPED | OUTPATIENT
Start: 2023-01-17 | End: 2023-02-06

## 2023-01-17 RX ORDER — HYDROCODONE BITARTRATE AND ACETAMINOPHEN 5; 325 MG/1; MG/1
1 TABLET ORAL AS NEEDED
Status: DISCONTINUED | OUTPATIENT
Start: 2023-01-17 | End: 2023-01-17 | Stop reason: HOSPADM

## 2023-01-17 RX ORDER — FACIAL-BODY WIPES
10 EACH TOPICAL DAILY PRN
Status: DISCONTINUED | OUTPATIENT
Start: 2023-01-19 | End: 2023-01-18 | Stop reason: HOSPADM

## 2023-01-17 RX ORDER — NALOXONE HYDROCHLORIDE 0.4 MG/ML
0.4 INJECTION, SOLUTION INTRAMUSCULAR; INTRAVENOUS; SUBCUTANEOUS AS NEEDED
Status: DISCONTINUED | OUTPATIENT
Start: 2023-01-17 | End: 2023-01-18 | Stop reason: HOSPADM

## 2023-01-17 RX ORDER — HYDROMORPHONE HYDROCHLORIDE 1 MG/ML
0.2 INJECTION, SOLUTION INTRAMUSCULAR; INTRAVENOUS; SUBCUTANEOUS
Status: DISCONTINUED | OUTPATIENT
Start: 2023-01-17 | End: 2023-01-17 | Stop reason: HOSPADM

## 2023-01-17 RX ORDER — SULFAMETHOXAZOLE AND TRIMETHOPRIM 800; 160 MG/1; MG/1
1 TABLET ORAL 2 TIMES DAILY
Qty: 20 TABLET | Refills: 0 | Status: SHIPPED | OUTPATIENT
Start: 2023-01-17 | End: 2023-01-27

## 2023-01-17 RX ORDER — ONDANSETRON 2 MG/ML
4 INJECTION INTRAMUSCULAR; INTRAVENOUS AS NEEDED
Status: DISCONTINUED | OUTPATIENT
Start: 2023-01-17 | End: 2023-01-17 | Stop reason: HOSPADM

## 2023-01-17 RX ORDER — ROCURONIUM BROMIDE 10 MG/ML
INJECTION, SOLUTION INTRAVENOUS AS NEEDED
Status: DISCONTINUED | OUTPATIENT
Start: 2023-01-17 | End: 2023-01-17 | Stop reason: HOSPADM

## 2023-01-17 RX ORDER — SODIUM CHLORIDE 0.9 % (FLUSH) 0.9 %
5-40 SYRINGE (ML) INJECTION EVERY 8 HOURS
Status: DISCONTINUED | OUTPATIENT
Start: 2023-01-17 | End: 2023-01-18 | Stop reason: HOSPADM

## 2023-01-17 RX ORDER — PROPOFOL 10 MG/ML
INJECTION, EMULSION INTRAVENOUS AS NEEDED
Status: DISCONTINUED | OUTPATIENT
Start: 2023-01-17 | End: 2023-01-17 | Stop reason: HOSPADM

## 2023-01-17 RX ORDER — LIDOCAINE HYDROCHLORIDE 20 MG/ML
INJECTION, SOLUTION EPIDURAL; INFILTRATION; INTRACAUDAL; PERINEURAL AS NEEDED
Status: DISCONTINUED | OUTPATIENT
Start: 2023-01-17 | End: 2023-01-17 | Stop reason: HOSPADM

## 2023-01-17 RX ORDER — MIDAZOLAM HYDROCHLORIDE 1 MG/ML
INJECTION, SOLUTION INTRAMUSCULAR; INTRAVENOUS AS NEEDED
Status: DISCONTINUED | OUTPATIENT
Start: 2023-01-17 | End: 2023-01-17 | Stop reason: HOSPADM

## 2023-01-17 RX ORDER — CEFAZOLIN SODIUM 1 G/3ML
INJECTION, POWDER, FOR SOLUTION INTRAMUSCULAR; INTRAVENOUS AS NEEDED
Status: DISCONTINUED | OUTPATIENT
Start: 2023-01-17 | End: 2023-01-17 | Stop reason: HOSPADM

## 2023-01-17 RX ORDER — DEXAMETHASONE SODIUM PHOSPHATE 4 MG/ML
INJECTION, SOLUTION INTRA-ARTICULAR; INTRALESIONAL; INTRAMUSCULAR; INTRAVENOUS; SOFT TISSUE AS NEEDED
Status: DISCONTINUED | OUTPATIENT
Start: 2023-01-17 | End: 2023-01-17 | Stop reason: HOSPADM

## 2023-01-17 RX ORDER — AMOXICILLIN 250 MG
1 CAPSULE ORAL 2 TIMES DAILY
Status: DISCONTINUED | OUTPATIENT
Start: 2023-01-17 | End: 2023-01-18 | Stop reason: HOSPADM

## 2023-01-17 RX ORDER — GLYCOPYRROLATE 0.2 MG/ML
0.2 INJECTION INTRAMUSCULAR; INTRAVENOUS
Status: DISCONTINUED | OUTPATIENT
Start: 2023-01-17 | End: 2023-01-17 | Stop reason: HOSPADM

## 2023-01-17 RX ORDER — ACETAMINOPHEN 325 MG/1
650 TABLET ORAL ONCE
Status: COMPLETED | OUTPATIENT
Start: 2023-01-17 | End: 2023-01-17

## 2023-01-17 RX ORDER — ACETAMINOPHEN 500 MG
1000 TABLET ORAL EVERY 6 HOURS
Status: DISCONTINUED | OUTPATIENT
Start: 2023-01-17 | End: 2023-01-18 | Stop reason: HOSPADM

## 2023-01-17 RX ORDER — ALBUTEROL SULFATE 0.83 MG/ML
2.5 SOLUTION RESPIRATORY (INHALATION) AS NEEDED
Status: DISCONTINUED | OUTPATIENT
Start: 2023-01-17 | End: 2023-01-17 | Stop reason: HOSPADM

## 2023-01-17 RX ORDER — SODIUM CHLORIDE, SODIUM LACTATE, POTASSIUM CHLORIDE, CALCIUM CHLORIDE 600; 310; 30; 20 MG/100ML; MG/100ML; MG/100ML; MG/100ML
1000 INJECTION, SOLUTION INTRAVENOUS CONTINUOUS
Status: DISCONTINUED | OUTPATIENT
Start: 2023-01-17 | End: 2023-01-17 | Stop reason: HOSPADM

## 2023-01-17 RX ORDER — FENTANYL CITRATE 50 UG/ML
INJECTION, SOLUTION INTRAMUSCULAR; INTRAVENOUS AS NEEDED
Status: DISCONTINUED | OUTPATIENT
Start: 2023-01-17 | End: 2023-01-17 | Stop reason: HOSPADM

## 2023-01-17 RX ORDER — DIPHENHYDRAMINE HYDROCHLORIDE 50 MG/ML
12.5 INJECTION, SOLUTION INTRAMUSCULAR; INTRAVENOUS AS NEEDED
Status: DISCONTINUED | OUTPATIENT
Start: 2023-01-17 | End: 2023-01-17 | Stop reason: HOSPADM

## 2023-01-17 RX ORDER — MIDAZOLAM HYDROCHLORIDE 1 MG/ML
0.5 INJECTION, SOLUTION INTRAMUSCULAR; INTRAVENOUS
Status: DISCONTINUED | OUTPATIENT
Start: 2023-01-17 | End: 2023-01-17 | Stop reason: HOSPADM

## 2023-01-17 RX ORDER — LIDOCAINE HYDROCHLORIDE 10 MG/ML
0.1 INJECTION, SOLUTION EPIDURAL; INFILTRATION; INTRACAUDAL; PERINEURAL AS NEEDED
Status: DISCONTINUED | OUTPATIENT
Start: 2023-01-17 | End: 2023-01-17 | Stop reason: HOSPADM

## 2023-01-17 RX ORDER — DIPHENHYDRAMINE HYDROCHLORIDE 50 MG/ML
12.5 INJECTION, SOLUTION INTRAMUSCULAR; INTRAVENOUS
Status: DISCONTINUED | OUTPATIENT
Start: 2023-01-17 | End: 2023-01-18 | Stop reason: HOSPADM

## 2023-01-17 RX ORDER — FENTANYL CITRATE 50 UG/ML
25 INJECTION, SOLUTION INTRAMUSCULAR; INTRAVENOUS
Status: COMPLETED | OUTPATIENT
Start: 2023-01-17 | End: 2023-01-17

## 2023-01-17 RX ORDER — SUCCINYLCHOLINE CHLORIDE 20 MG/ML
INJECTION INTRAMUSCULAR; INTRAVENOUS AS NEEDED
Status: DISCONTINUED | OUTPATIENT
Start: 2023-01-17 | End: 2023-01-17 | Stop reason: HOSPADM

## 2023-01-17 RX ADMIN — SODIUM CHLORIDE 125 ML/HR: 9 INJECTION, SOLUTION INTRAVENOUS at 13:47

## 2023-01-17 RX ADMIN — ONDANSETRON HYDROCHLORIDE 4 MG: 2 INJECTION, SOLUTION INTRAMUSCULAR; INTRAVENOUS at 11:43

## 2023-01-17 RX ADMIN — PROPOFOL 100 MG: 10 INJECTION, EMULSION INTRAVENOUS at 11:36

## 2023-01-17 RX ADMIN — DEXMEDETOMIDINE HYDROCHLORIDE 10 MCG: 100 INJECTION, SOLUTION, CONCENTRATE INTRAVENOUS at 12:21

## 2023-01-17 RX ADMIN — CYCLOBENZAPRINE 10 MG: 10 TABLET, FILM COATED ORAL at 22:35

## 2023-01-17 RX ADMIN — ACETAMINOPHEN 1000 MG: 500 TABLET, FILM COATED ORAL at 18:40

## 2023-01-17 RX ADMIN — HYDROMORPHONE HYDROCHLORIDE 0.2 MG: 1 INJECTION, SOLUTION INTRAMUSCULAR; INTRAVENOUS; SUBCUTANEOUS at 13:52

## 2023-01-17 RX ADMIN — MIDAZOLAM 1 MG: 1 INJECTION INTRAMUSCULAR; INTRAVENOUS at 11:30

## 2023-01-17 RX ADMIN — HYDROMORPHONE HYDROCHLORIDE 0.2 MG: 1 INJECTION, SOLUTION INTRAMUSCULAR; INTRAVENOUS; SUBCUTANEOUS at 13:32

## 2023-01-17 RX ADMIN — HYDROMORPHONE HYDROCHLORIDE 0.2 MG: 1 INJECTION, SOLUTION INTRAMUSCULAR; INTRAVENOUS; SUBCUTANEOUS at 14:12

## 2023-01-17 RX ADMIN — PROPOFOL 25 MG: 10 INJECTION, EMULSION INTRAVENOUS at 12:38

## 2023-01-17 RX ADMIN — MIDAZOLAM 1 MG: 1 INJECTION INTRAMUSCULAR; INTRAVENOUS at 11:27

## 2023-01-17 RX ADMIN — SUCCINYLCHOLINE CHLORIDE 80 MG: 20 INJECTION, SOLUTION INTRAMUSCULAR; INTRAVENOUS at 11:37

## 2023-01-17 RX ADMIN — FENTANYL CITRATE 25 MCG: 50 INJECTION, SOLUTION INTRAMUSCULAR; INTRAVENOUS at 13:44

## 2023-01-17 RX ADMIN — CEFAZOLIN 2 G: 1 INJECTION, POWDER, FOR SOLUTION INTRAMUSCULAR; INTRAVENOUS at 19:30

## 2023-01-17 RX ADMIN — SODIUM CHLORIDE, PRESERVATIVE FREE 10 ML: 5 INJECTION INTRAVENOUS at 15:23

## 2023-01-17 RX ADMIN — LIDOCAINE HYDROCHLORIDE 40 MG: 20 INJECTION, SOLUTION EPIDURAL; INFILTRATION; INTRACAUDAL; PERINEURAL at 11:36

## 2023-01-17 RX ADMIN — FENTANYL CITRATE 25 MCG: 50 INJECTION, SOLUTION INTRAMUSCULAR; INTRAVENOUS at 13:30

## 2023-01-17 RX ADMIN — FENTANYL CITRATE 50 MCG: 50 INJECTION, SOLUTION INTRAMUSCULAR; INTRAVENOUS at 12:08

## 2023-01-17 RX ADMIN — FENTANYL CITRATE 50 MCG: 50 INJECTION, SOLUTION INTRAMUSCULAR; INTRAVENOUS at 11:36

## 2023-01-17 RX ADMIN — OXYCODONE HYDROCHLORIDE 10 MG: 5 TABLET ORAL at 16:24

## 2023-01-17 RX ADMIN — CEPHALEXIN 500 MG: 500 CAPSULE ORAL at 18:40

## 2023-01-17 RX ADMIN — ROCURONIUM BROMIDE 5 MG: 10 SOLUTION INTRAVENOUS at 11:36

## 2023-01-17 RX ADMIN — PROPOFOL 25 MG: 10 INJECTION, EMULSION INTRAVENOUS at 12:35

## 2023-01-17 RX ADMIN — SENNOSIDES AND DOCUSATE SODIUM 1 TABLET: 50; 8.6 TABLET ORAL at 18:41

## 2023-01-17 RX ADMIN — FENTANYL CITRATE 25 MCG: 50 INJECTION, SOLUTION INTRAMUSCULAR; INTRAVENOUS at 13:18

## 2023-01-17 RX ADMIN — CEPHALEXIN 500 MG: 500 CAPSULE ORAL at 22:35

## 2023-01-17 RX ADMIN — DEXAMETHASONE SODIUM PHOSPHATE 4 MG: 4 INJECTION, SOLUTION INTRAMUSCULAR; INTRAVENOUS at 11:43

## 2023-01-17 RX ADMIN — CEFAZOLIN 1100 MG: 330 INJECTION, POWDER, FOR SOLUTION INTRAMUSCULAR; INTRAVENOUS at 12:21

## 2023-01-17 RX ADMIN — SODIUM CHLORIDE, POTASSIUM CHLORIDE, SODIUM LACTATE AND CALCIUM CHLORIDE 1000 ML: 600; 310; 30; 20 INJECTION, SOLUTION INTRAVENOUS at 11:19

## 2023-01-17 RX ADMIN — OXYCODONE HYDROCHLORIDE 10 MG: 5 TABLET ORAL at 19:30

## 2023-01-17 RX ADMIN — ACETAMINOPHEN 650 MG: 325 TABLET ORAL at 10:42

## 2023-01-17 RX ADMIN — OXYCODONE HYDROCHLORIDE 10 MG: 5 TABLET ORAL at 22:35

## 2023-01-17 RX ADMIN — PROPOFOL 25 MG: 10 INJECTION, EMULSION INTRAVENOUS at 12:31

## 2023-01-17 RX ADMIN — ACETAMINOPHEN 1000 MG: 500 TABLET, FILM COATED ORAL at 22:35

## 2023-01-17 RX ADMIN — MORPHINE SULFATE 2 MG: 2 INJECTION, SOLUTION INTRAMUSCULAR; INTRAVENOUS at 15:21

## 2023-01-17 RX ADMIN — FENTANYL CITRATE 25 MCG: 50 INJECTION, SOLUTION INTRAMUSCULAR; INTRAVENOUS at 13:09

## 2023-01-17 NOTE — PERIOP NOTES
1426: TRANSFER - OUT REPORT:    Verbal report given to Lizzeth AVILEZ(name) on Aime Aranda  being transferred to (unit) for routine post - op       Report consisted of patients Situation, Background, Assessment and   Recommendations(SBAR). Time Pre op antibiotic given:1221  Anesthesia Stop time: 8106    Information from the following report(s) SBAR, Kardex, Procedure Summary, Intake/Output, MAR, and Recent Results was reviewed with the receiving nurse. Opportunity for questions and clarification was provided. Is the patient on 02? NO       L/Min 0       Other none    Is the patient on a monitor? NO    Is the nurse transporting with the patient? NO    Surgical Waiting Area notified of patient's transfer from PACU?  YES      The following personal items collected during your admission accompanied patient upon transfer:   Dental Appliance: Dental Appliances: None  Vision:    Hearing Aid:    Jewelry:    Clothing: Clothing: With patient (returned to pt in PACU)  Other Valuables:    Valuables sent to safe:

## 2023-01-17 NOTE — DISCHARGE INSTRUCTIONS
[facility] Dr. Hamzah Jaquez  [phone]    After [address]:  Discharge Instructions Incision and Drainage of Thoracic Wound Surgery     Patient Name: Lito Brizuela    Date of procedure: 1/17/2023  Date of discharge: 1/18/2023    Procedure: Procedure(s):  INCISION AND DRAINAGE TO THORACIC SPINE WOUND      Follow up appointments  -Follow up with Dr. Hamzah Jaquez in 2 weeks. Call [phone] to make an appointment as soon as you get home from the hospital.    When to call your Orthopaedic Surgeon:  -Signs of infection-if your incision is red; continues to have drainage; drainage has a foul odor or if you have a persistent fever over 101 degrees for 24 hours  -Nausea or vomiting, severe headache  -Loss of bowel or bladder function, inability to urinate  -Changes in sensation in your arms or legs (numbness, tingling, loss of color)  -Increased weakness-greater than before your surgery  -Severe pain or pain not relieved by medications  -Signs of a blood clot in your leg-calf pain, tenderness, redness, swelling of lower leg    When to call your Primary Care Physician:  -Concerns about medical conditions such as diabetes, high blood pressure, asthma, congestive heart failure  -Call if blood sugars are elevated, persistent headache or dizziness, coughing or congestion, constipation or diarrhea, burning with urination, abnormal heart rate    When to call 911 and go to the nearest emergency room:  -Acute onset of chest pain, shortness of breath, difficulty breathing    Activity  - You are going home a well person, be as active as possible. Your only exercise should be walking. Start with short frequent walks and increase your walking distance each day.  -Limit the amount of time you sit to 20-30 minute intervals.   Sitting for prolonged periods of time will be uncomfortable for you following surgery.  -Do NOT lift anything over 5 pounds  -Do NOT do any straining, twisting or bending  -When you are in bed, you may lay on your back or on either side. Do NOT lie on your stomach    Diet  -Resume usual diet; drink plenty of fluids; eat foods high in fiber  -It is important to have regular bowel movements. Pain medications may cause constipation. You may want to take a stool softener (such as Senokot-S or Colace) to prevent constipation.  -If constipation occurs, take a laxative (such as Dulcolax tablets, Milk of Magnesia, or a suppository). Laxatives should only be used if the above preventable measures have failed and you still have not had a bowel movement after three days. Driving  -You may not drive or return to work until instructed by your physician. However, you may ride in the car for short periods of time. Incision Care  Your incision has been closed with absorbable sutures and sutures. This will assist with healing. The sutures remain on your incision for 2 weeks. Please make sure to wash your hands prior to touching your dressing. You may take brief showers but do not run the water directly onto the wound. After your shower, blot your incision dry with a soft towel and replace the dry dressing. Do not rub or apply any lotions or ointments to your incision site. Do not soak or scrub your wound. The sutures will be removed during your two week follow-up appointment. If you experience drainage leaking from your incision, please contact your orthopedic surgeons office. Showering  -You may shower in approximately 4 days after your surgery.    -Leave the dressing on during your shower. Do NOT allow the water to run directly onto your dressing. Once you get out of the shower, gently pat the dressing dry. -Reminder- your brace can be removed while showering. Remember to not bend or twist while your brace is off.    -Do not take a tub bath. Preventing blood clots  -You have been given T.E.D. stockings to wear.   Continue to wear these for 7 days after your discharge. Put them on in the morning and take them off at night.    -They are used to increase your circulation and prevent blood clots from forming in your legs  -T. E.D. stockings can be machine washed, temperature not to exceed 160° F (71°C) and machine dried for 15 to 20 minutes, temperature not to exceed 250° F (121°C). Pain management  -Take pain medication as prescribed; decrease the amount you use as your pain lessens  -Do not wait until you are in extreme pain to take your medication.  -Avoid alcoholic beverages while taking pain medication    Pain Medication Safety  DO:  -Read the Medication Guide   -Take your medicine exactly as prescribed   -Store your medicine away from children and in a safe place   -Flush unused medicine down the toilet   -Call your healthcare provider for medical advice about side effects. You may report side effects to FDA at 7-584-FDA-6753.   -Please be aware that many medications contain Tylenol. We do not want you to over medicate so please read the information below as a guide. Do not take more than 4 Grams of Tylenol in a 24 hour period. (There are 1000 milligrams in one Gram)                                                                                                                                                                                                                           Percocet contains 325 mg of Tylenol per tablet (do not take more than 12 tablets in 24 hours)  Lortab contains 500 mg of Tylenol per tablet (do not take more than 8 tablets in 24 hours)  Norco contains 325 mg of Tylenol per tablet (do not take more than 12 tablets in 24 hours). DO NOT:  -Do not give your medicine to others   -Do not take medicine unless it was prescribed for you   -Do not stop taking your medicine without talking to your healthcare provider   -Do not break, chew, crush, dissolve, or inject your medicine.  If you cannot  swallow your medicine whole, talk to your healthcare provider.  -Do not drink alcohol while taking this medicine  -Do not take anti-inflammatory medications or aspirin unless instructed by your physician.     ______________________________________________________________________    Anesthesia Discharge Instructions    After general anesthesia or intervenous sedation, for 24 hours or while taking prescription Narcotics:  Limit your activities  Do not drive or operate hazardous machinery  If you have not urinated within 8 hours after discharge, please contact your surgeon on call. Do not make important personal or business decisions  Do not drink alcoholic beverages    Report the following to your surgeon:  Excessive pain, swelling, redness or odor of or around the surgical area  Temperature over 100.5 degrees  Nausea and vomiting lasting longer than 4 hours or if unable to take medication  Any signs of decreased circulation or nerve impairment to extremity:  Change in color, persistent numbness, tingling, coldness or increased pain.   Any questions

## 2023-01-17 NOTE — BRIEF OP NOTE
Brief Postoperative Note    Patient: Zayra Lawson  YOB: 1955  MRN: [id number]    Date of Procedure: 1/17/2023     Pre-Op Diagnosis: WOUND DRAINAGE    Post-Op Diagnosis: Same as preoperative diagnosis.       Procedure(s):  INCISION AND DRAINAGE TO THORACIC SPINE WOUND    Surgeon(s):  Alphonso Chang MD    Surgical Assistant: Physician Assistant: CHERELLE Pelletier    Anesthesia: General     Estimated Blood Loss (mL): Minimal    Complications: None    Specimens:   ID Type Source Tests Collected by Time Destination   1 : Thoracic wound Wound Back CULTURE, ANAEROBIC, CULTURE, WOUND W Renetta Grover MD 1/17/2023 1218 Microbiology        Implants: * No implants in log *    Drains: * No LDAs found *    Findings: Superficial spinal cord stimulator leads and wound drainage    Electronically Signed by CHERELLE Dickson on 1/17/2023 at 12:50 PM

## 2023-01-17 NOTE — PERIOP NOTES
Patient: Mayra Garcia MRN: [id number]  SSN: [id number]   YOB: 1955  Age: 78 y.o. Sex: female     Patient is status post Procedure(s):  INCISION AND DRAINAGE TO THORACIC SPINE WOUND. Surgeon(s) and Role: Carmela Campo MD - Primary    Local/Dose/Irrigation: No local given.                   Peripheral IV 01/17/23 Right Brachial (Active)                       Dressing/Packing:  Incision 01/17/23 Back-Dressing/Treatment: ABD pad;Tape/Soft cloth adhesive tape (01/17/23 1241)

## 2023-01-17 NOTE — ANESTHESIA PREPROCEDURE EVALUATION
Anesthetic History   No history of anesthetic complications            Review of Systems / Medical History  Patient summary reviewed, nursing notes reviewed and pertinent labs reviewed    Pulmonary  Within defined limits        Undiagnosed apnea         Neuro/Psych   Within defined limits           Cardiovascular  Within defined limits                Exercise tolerance: >4 METS     GI/Hepatic/Renal  Within defined limits              Endo/Other        Arthritis     Other Findings            Physical Exam    Airway  Mallampati: I  TM Distance: 4 - 6 cm  Neck ROM: normal range of motion   Mouth opening: Normal     Cardiovascular  Regular rate and rhythm,  S1 and S2 normal,  no murmur, click, rub, or gallop             Dental  No notable dental hx       Pulmonary  Breath sounds clear to auscultation               Abdominal  GI exam deferred       Other Findings            Anesthetic Plan    ASA: 2  Anesthesia type: general          Induction: Intravenous  Anesthetic plan and risks discussed with: Patient

## 2023-01-17 NOTE — H&P
Mayra Garcia (: 1955) is a 78 y.o. female, patient, here for evaluation of the following chief complaint(s):  Back Pain (Thoracic MRI Results 22)       ASSESSMENT/PLAN:    Below is the assessment and plan developed based on review of pertinent history, physical exam, labs, studies, and medications. The thoracic wound has improved significantly in the last 5 days since she started the antibiotics. The lab work was reasonably normal and the MRI of the thoracic spine did not show any fluid collections. I would like you to continue the antibiotics for a minimum of 2 weeks. I refilled her pain medication but of asked her to continue to start weaning that. I would like to do another wound check in 2 weeks. 1. S/P lumbar fusion  -     HYDROcodone-acetaminophen (Norco) 5-325 mg per tablet; Take 1 Tablet by mouth every eight (8) hours as needed for Pain for up to 30 days. Max Daily Amount: 3 Tablets., Normal, Disp-40 Tablet, R-0  2. Chronic bilateral low back pain with bilateral sciatica  -     HYDROcodone-acetaminophen (Norco) 5-325 mg per tablet; Take 1 Tablet by mouth every eight (8) hours as needed for Pain for up to 30 days. Max Daily Amount: 3 Tablets., Normal, Disp-40 Tablet, R-0  3. Acute neck pain  -     HYDROcodone-acetaminophen (Norco) 5-325 mg per tablet; Take 1 Tablet by mouth every eight (8) hours as needed for Pain for up to 30 days. Max Daily Amount: 3 Tablets., Normal, Disp-40 Tablet, R-0    No follow-ups on file. SUBJECTIVE/OBJECTIVE:  Mayra Garcia (: 1955) is a 78 y.o. female. No flowsheet data found. She was initially doing well after placement of her stimulator. She still has had pain along the tract of the spinal cord stimulator but it has improved in the last week since she started her antibiotics. The thoracic wound is improving nicely. She denies any fevers or chills.     Imaging:    XR Results (most recent):  Results from Appointment encounter on 22    XR SPINE THORACOLUMB JUNCTION MIN 2 V    Narrative  AP, lateral films of the thoracolumbar spine reveal no evidence of acute fracture or lytic lesion. L3-S1 hardware well-positioned and intact. Spinal stimulator battery located over right buttock and paddle well-positioned and thoracic spine. MRI Results (most recent):    No recent results      No Known Allergies    Current Outpatient Medications   Medication Sig    HYDROcodone-acetaminophen (Norco) 5-325 mg per tablet Take 1 Tablet by mouth every eight (8) hours as needed for Pain for up to 30 days. Max Daily Amount: 3 Tablets. cephALEXin (KEFLEX) 500 mg capsule Take 1 Capsule by mouth four (4) times daily. cyclobenzaprine (FLEXERIL) 10 mg tablet TAKE 1 TABLET BY MOUTH THREE TIMES A DAY AS NEEDED    naproxen (NAPROSYN) 500 mg tablet Take 500 mg by mouth two (2) times daily as needed for Pain. SUMAtriptan (IMITREX) 50 mg tablet Take 50 mg by mouth as needed for Migraine. fremanezumab-vfrm (Ajovy Autoinjector) 225 mg/1.5 mL auto-injector 225 mg by SubCUTAneous route once. topiramate (TOPAMAX) 200 mg tablet Take 200 mg by mouth daily. furosemide (LASIX) 20 mg tablet Take 20 mg by mouth daily. No current facility-administered medications for this visit. Past Medical History:   Diagnosis Date    Arthritis     Cancer ([facility] [address]     BCC FOREHEAD, LEFT ARM    Chronic pain     Migraine     LAST HEADACHE 10-30-22    Other ill-defined conditions(799.89)     MIGRAINES        Past Surgical History:   Procedure Laterality Date    HX GI      COLONOSCOPY MULTIPLE. HX GI      ENDOSCOPY    HX GYN       X 1    HX HYSTERECTOMY      HX ORTHOPAEDIC      Spinal fusion at L4-L5 X 3    HX OTHER SURGICAL      DIAGNOSTIC EXPLORATORY SURGERIES MULTIPLE.     HX WISDOM TEETH EXTRACTION       X 4    IR INJ FORAMIN EPID LUMB ANES/STER SNGL  2020    SD UNLISTED NEUROLOGICAL/NEUROMUSCULAR DX PX      LUMBAR FUSION WITH RODS & SCREWS Family History   Problem Relation Age of Onset    Cancer Mother         COLON CA    Other Neg Hx         UNKNOWN FAMILY HX        Social History     Tobacco Use    Smoking status: Never    Smokeless tobacco: Never   Vaping Use    Vaping Use: Never used   Substance Use Topics    Alcohol use: No    Drug use: No        Review of Systems   Musculoskeletal:  Positive for back pain. All other systems reviewed and are negative. Vitals:  Ht 5' (1.524 m)   Wt 101 lb (45.8 kg)   BMI 19.73 kg/m²    Body mass index is 19.73 kg/m². Ortho Exam       Integumentary  Assessment of Surgical Incision - healing and consistent with normal anticipated wound healing. Some granulation in the inferior portion of her thoracic wound with some mild fluctuance in the inferior portion. No obvious erythema. Tenderness on palpation of the lateral flank. Neurologic  Sensory  Light Touch - Intact - Globally. Overall Assessment of Muscle Strength and Tone reveals  Lower Extremities - Right Iliopsoas - 5/5. Left Iliopsoas - 5/5. Right Tibialis Anterior - 5/5. Left Tibialis Anterior - 5/5. Right Gastroc-Soleus - 5/5. Left Gastroc-Soleus - 5/5. Right EHL - 5/5. Left EHL - 5/5. General Assessment of Reflexes  Right Ankle - Clonus is not present. Left Ankle - Clonus is not present. Reflexes (Dermatomes)  2/2 Normal - Left Achilles (L5-S2), Left Knee (L2-4), Right Achilles (L5-S2) and Right Knee (L2-4). Musculoskeletal  Global Assessment  Examination of related systems reveals - well-developed, well-nourished, in no acute distress, alert and oriented x 3 and normal coordination. Spine/Ribs/Pelvis  Lumbosacral Spine - Examination of the lumbosacral spine reveals - no known fractures or deformities. Inspection and Palpation - Tenderness - moderate. Assessment of pain reveals the following findings - The pain is characterized as - moderate. Location - pain refers to lower back bilaterally.

## 2023-01-17 NOTE — PROGRESS NOTES
Nursing Mobility Assessment    Aura's Egress Test:    Activity: Activity: In bed;Resting quietly  Weight Bearing Status: Weight Bearing Status: WBAT (Weight Bearing as Tolerated)  Level of Assistance: Activity Assistance: Partial (one person)  Ambulation: Ambulate: Ambulate to bathroom  Distance Ambulated:    Assistive Device: Assistive Device: Fall prevention device  Activity Response: Activity Response:  Tolerated well

## 2023-01-18 VITALS
RESPIRATION RATE: 18 BRPM | HEIGHT: 60 IN | HEART RATE: 74 BPM | DIASTOLIC BLOOD PRESSURE: 61 MMHG | BODY MASS INDEX: 19.04 KG/M2 | OXYGEN SATURATION: 98 % | TEMPERATURE: 98.4 F | SYSTOLIC BLOOD PRESSURE: 104 MMHG | WEIGHT: 97 LBS

## 2023-01-18 LAB
ANION GAP SERPL CALC-SCNC: 7 MMOL/L (ref 5–15)
BUN SERPL-MCNC: 16 MG/DL (ref 6–20)
BUN/CREAT SERPL: 21 (ref 12–20)
CALCIUM SERPL-MCNC: 8.9 MG/DL (ref 8.5–10.1)
CHLORIDE SERPL-SCNC: 114 MMOL/L (ref 97–108)
CO2 SERPL-SCNC: 21 MMOL/L (ref 21–32)
CREAT SERPL-MCNC: 0.75 MG/DL (ref 0.55–1.02)
GLUCOSE SERPL-MCNC: 143 MG/DL (ref 65–100)
POTASSIUM SERPL-SCNC: 3.8 MMOL/L (ref 3.5–5.1)
SODIUM SERPL-SCNC: 142 MMOL/L (ref 136–145)

## 2023-01-18 PROCEDURE — 74011250636 HC RX REV CODE- 250/636: Performed by: STUDENT IN AN ORGANIZED HEALTH CARE EDUCATION/TRAINING PROGRAM

## 2023-01-18 PROCEDURE — 80048 BASIC METABOLIC PNL TOTAL CA: CPT

## 2023-01-18 PROCEDURE — 74011000250 HC RX REV CODE- 250: Performed by: STUDENT IN AN ORGANIZED HEALTH CARE EDUCATION/TRAINING PROGRAM

## 2023-01-18 PROCEDURE — 74011250637 HC RX REV CODE- 250/637: Performed by: STUDENT IN AN ORGANIZED HEALTH CARE EDUCATION/TRAINING PROGRAM

## 2023-01-18 PROCEDURE — 36415 COLL VENOUS BLD VENIPUNCTURE: CPT

## 2023-01-18 RX ORDER — CIPROFLOXACIN 750 MG/1
750 TABLET, FILM COATED ORAL 2 TIMES DAILY
Qty: 20 TABLET | Refills: 0 | Status: SHIPPED | OUTPATIENT
Start: 2023-01-18

## 2023-01-18 RX ADMIN — POLYETHYLENE GLYCOL 3350 17 G: 17 POWDER, FOR SOLUTION ORAL at 09:24

## 2023-01-18 RX ADMIN — ACETAMINOPHEN 1000 MG: 500 TABLET, FILM COATED ORAL at 04:47

## 2023-01-18 RX ADMIN — CEPHALEXIN 500 MG: 500 CAPSULE ORAL at 09:24

## 2023-01-18 RX ADMIN — CEFAZOLIN 2 G: 1 INJECTION, POWDER, FOR SOLUTION INTRAMUSCULAR; INTRAVENOUS at 04:46

## 2023-01-18 RX ADMIN — SENNOSIDES AND DOCUSATE SODIUM 1 TABLET: 50; 8.6 TABLET ORAL at 09:23

## 2023-01-18 RX ADMIN — OXYCODONE HYDROCHLORIDE 10 MG: 5 TABLET ORAL at 09:23

## 2023-01-18 RX ADMIN — TOPIRAMATE 200 MG: 100 TABLET, FILM COATED ORAL at 09:23

## 2023-01-18 RX ADMIN — OXYCODONE HYDROCHLORIDE 5 MG: 5 TABLET ORAL at 04:47

## 2023-01-18 NOTE — PROGRESS NOTES
[place] Outpatient Observation Notice (Junius Code) provided to patient/representative with verbal explanation of the notice. Time allotted for questions regarding the notice. Patient /representative provided a completed copy of the VOON notice. Copy placed on bedside chart.

## 2023-01-18 NOTE — PROGRESS NOTES
Orthopedic Spine Progress Note  Post Op day: 1 Day Post-Op    2023 8:11 AM   Admit Date: 2023  Procedure: Procedure(s):  INCISION AND DRAINAGE TO THORACIC SPINE WOUND    Subjective:     Faustine Yung without c/o this AM. Pain control adequate. Tolerating diet. Denies N/V, CP, SOB, F/C. Pain Control:   Pain Assessment  Pain Scale 1: Numeric (0 - 10)  Pain Intensity 1: 6  Pain Onset 1: postop  Pain Location 1: Back  Pain Orientation 1: Upper  Pain Description 1: Aching  Pain Intervention(s) 1: Medication (see MAR)    Objective:          Physical Exam:  General:  Alert and oriented. No acute distress. Heart:  Respirations unlabored. Abdomen:   Extremities: Soft, non-tender. No evidence of cyanosis. Pulses palpable in both upper and lower extremities. Neurologic:  Musculoskeletal:  No new motor deficits. Neurovascular exam within normal limits. Sensation stable. Motor: unchanged C5-T1 and L2-S1. Josefa's sign negative in bilateral lower extremities. Calves soft, nontender upon palpation. Moves both upper and lower extremities. Incision: clean, dry, and intact. No significant erythema or swelling. No active drainage noted. Vital Signs:    Blood pressure 104/61, pulse 74, temperature 98.4 °F (36.9 °C), resp. rate 18, height 5' (1.524 m), weight 44 kg (97 lb), SpO2 98 %. Temp (24hrs), Av.8 °F (36.6 °C), Min:97.4 °F (36.3 °C), Max:98.4 °F (36.9 °C)      LAB:    No results for input(s): HCT, HGB, PLT, HCTEXT, HGBEXT, PLTEXT in the last 72 hours. Lab Results   Component Value Date/Time    Sodium 142 2023 03:38 AM    Potassium 3.8 2023 03:38 AM    Chloride 114 (H) 2023 03:38 AM    CO2 21 2023 03:38 AM    Glucose 143 (H) 2023 03:38 AM    BUN 16 2023 03:38 AM    Creatinine 0.75 2023 03:38 AM    Calcium 8.9 2023 03:38 AM       Intake/Output:  No intake/output data recorded.   1901 -  0700  In: 800 [I.V.:800]  Out: 10 PT/OT:   Gait:                    Assessment:   Patient is 1 Day Post-Op s/p Procedure(s):  INCISION AND DRAINAGE TO THORACIC SPINE WOUND    Plan:     1. Mobilize as tolerated. 2.  Continue established methods of pain control. 3.  VTE Prophylaxes - TEDS &/or SCDs, mobilize. 4.  Advance diet as tolerated. 5.  Continue oral antibiotics. 5.  Plan on home today. Follow up in office in 1 week for post op care.        Signed By: Deysi Desir PA-C

## 2023-01-18 NOTE — ANESTHESIA POSTPROCEDURE EVALUATION
Post-Anesthesia Evaluation and Assessment    Patient: Jake Rosario MRN: [id number]  SSN: [id number]    YOB: 1955  Age: 78 y.o. Sex: female      I have evaluated the patient and they are stable and ready for discharge from the PACU. Cardiovascular Function/Vital Signs  Visit Vitals  /61 (BP 1 Location: Left upper arm, BP Patient Position: At rest)   Pulse 74   Temp 36.9 °C (98.4 °F)   Resp 18   Ht 5' (1.524 m)   Wt 44 kg (97 lb)   SpO2 98%   BMI 18.94 kg/m²       Patient is status post General anesthesia for Procedure(s):  INCISION AND DRAINAGE TO THORACIC SPINE WOUND. Nausea/Vomiting: None    Postoperative hydration reviewed and adequate. Pain:  Pain Scale 1: Numeric (0 - 10) (01/18/23 1023)  Pain Intensity 1: 7 (01/18/23 1023)   Managed    Neurological Status:   Neuro (WDL): Within Defined Limits (01/17/23 1345)  Neuro  LUE Motor Response: Purposeful;Spontaneous  (01/18/23 0800)  LLE Motor Response: Purposeful;Spontaneous  (01/18/23 0800)  RUE Motor Response: Purposeful;Spontaneous  (01/18/23 0800)  RLE Motor Response: Purposeful;Spontaneous  (01/18/23 0800)   At baseline    Mental Status, Level of Consciousness: Alert and  oriented to person, place, and time    Pulmonary Status:   O2 Device: None (Room air) (01/18/23 0800)   Adequate oxygenation and airway patent    Complications related to anesthesia: None    Post-anesthesia assessment completed. No concerns    Signed By: Drake Obrien MD     January 18, 2023              Procedure(s):  INCISION AND DRAINAGE TO THORACIC SPINE WOUND.    general    <BSHSIANPOST>    INITIAL Post-op Vital signs:   Vitals Value Taken Time   /82 01/17/23 1445   Temp 36.3 °C (97.4 °F) 01/17/23 1345   Pulse 66 01/17/23 1446   Resp 8 01/17/23 1446   SpO2 98 % 01/17/23 1446   Vitals shown include unvalidated device data.

## 2023-01-18 NOTE — PROGRESS NOTES
Pt seen and examined. Ms. Raine Maza is doing well. Bandage changed and incision is C/D/I. She has no complaints. Ambulating around room without issue. Denies fever and chills. OK to d/c home with Bactrim. Recommend probiotic. Outpt follow up in 1-2 weeks.

## 2023-01-18 NOTE — OP NOTES
[address]  OPERATIVE REPORT    Name:  Sherly Beauchamp  MR#:  [id number]  :  1955  ACCOUNT #:  [de-identified]  DATE OF SERVICE:  2023    PREOPERATIVE DIAGNOSES:  1.  Status post dorsal cord stimulator paddle lead placement. 2.  Thoracic wound drainage. POSTOPERATIVE DIAGNOSES:  1.  Status post dorsal cord stimulator paddle lead placement. 2.  Thoracic wound drainage. PROCEDURE PERFORMED:  Irrigation and debridement of the thoracic wound. SURGEON:  Hamzah Pagan MD    ASSISTANT:  Gela Davies PA-C    ANESTHESIA:  General.    COMPLICATIONS:  None. SPECIMENS REMOVED:  Include anaerobic and aerobic cultures. IMPLANTS:  No instrumentation. ESTIMATED BLOOD LOSS:  Minimal.    INDICATIONS:  This is a very pleasant 78-year-old female who is almost 3 months out from a DCS placement. She started having drainage from her superior thoracic wound approximately 2 weeks ago. This has persisted despite antibiotics. She is for irrigation and debridement. PROCEDURE:  The patient was brought to the operative suite and underwent general anesthesia without difficulty. Placed in prone position on the Gregg frame with all bony prominences well padded. Antibiotics held until after the cultures were taken. Prepping and draping was performed. Time-out confirmed. 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 We cultured this. We then opened the fascia up again and identified the lead wires from the DCS. We irrigated with antibiotic solution followed by pulse irrigation. We then placed vancomycin powder in the deep wound followed by #1 Stratafix on the fascial layer, 2-0 Stratrafix on the subcutaneous layer, and nylon on the skin. The patient had light dressing placed and the patient returned to the PACU in stable condition.     Please note that Gela Davies, [facility], was present and assisted during the entire surgical procedure. She was key in the exposure of the surgical field as well as the irrigation and debridement as well as the wound closure and dressing placement. Please note there was no resident or surgical assistant available.       Maranda Tran MD      [id number]  D:  01/17/2023 13:23  T:  01/17/2023 21:26  JOB #:  [id number]

## 2023-01-19 LAB
BACTERIA SPEC CULT: ABNORMAL
BACTERIA SPEC CULT: NORMAL
GRAM STN SPEC: ABNORMAL
GRAM STN SPEC: ABNORMAL
SERVICE CMNT-IMP: ABNORMAL
SERVICE CMNT-IMP: NORMAL

## 2023-01-24 ENCOUNTER — OFFICE VISIT (OUTPATIENT)
Dept: ORTHOPEDIC SURGERY | Age: 68
End: 2023-01-24
Payer: COMMERCIAL

## 2023-01-24 VITALS — HEIGHT: 60 IN | BODY MASS INDEX: 19.04 KG/M2 | WEIGHT: 97 LBS

## 2023-01-24 DIAGNOSIS — Z96.89 S/P INSERTION OF SPINAL CORD STIMULATOR: Primary | ICD-10-CM

## 2023-01-24 DIAGNOSIS — L24.A9 WOUND DRAINAGE: ICD-10-CM

## 2023-01-24 DIAGNOSIS — M54.42 CHRONIC BILATERAL LOW BACK PAIN WITH BILATERAL SCIATICA: ICD-10-CM

## 2023-01-24 DIAGNOSIS — G89.29 CHRONIC BILATERAL LOW BACK PAIN WITH BILATERAL SCIATICA: ICD-10-CM

## 2023-01-24 DIAGNOSIS — M54.41 CHRONIC BILATERAL LOW BACK PAIN WITH BILATERAL SCIATICA: ICD-10-CM

## 2023-01-24 PROCEDURE — 99024 POSTOP FOLLOW-UP VISIT: CPT | Performed by: PHYSICIAN ASSISTANT

## 2023-01-24 NOTE — PROGRESS NOTES
Flory Slaughter (: 1955) is a 78 y.o. female, established patient, here for evaluation of the following chief complaint(s):  No chief complaint on file. ASSESSMENT/PLAN:      1. S/P insertion of spinal cord stimulator  2. Wound drainage  3. Chronic bilateral low back pain with bilateral sciatica      Below is the assessment and plan developed based on review of pertinent history, physical exam, labs, studies, and medications. Have discussed the patient's diagnosis, physical exam and radiographic findings at length and have answered all patient questions to her satisfaction. Have encouraged the patient to continue home exercise and walking program as tolerated. The patient will continue hydrocodone for pain and Cipro as an antibiotic. Will plan on seeing the patient back for reevaluation in one weeks time, sooner should she develop any postoperative complications. The patient understands and agrees with the treatment plan as outlined above. The patient was instructed to contact this office with any questions or concerns. SUBJECTIVE/OBJECTIVE:    Flory Slaughter (: 1955) is a 78 y.o. female. Incision And Drainage To Thoracic Spine Wound 2023     The patient states that she feels like her pain is getting worse. Patient states her pain level is severe and burning in nature. Patient localizes pain mainly around the thoracic incisional area but also has now developed pain where the stimulator is in lower lumbar region. Patient has been taking Cipro 750 mg twice daily as an antibiotic and hydrocodone 3 times daily for pain relief. The patient has been doing daily dressing changes. The patient feels like her body is rejecting the stimulator implant. Patient denies fevers or chills. No Known Allergies    Current Outpatient Medications   Medication Sig    ciprofloxacin HCl (CIPRO) 750 mg tablet Take 1 Tablet by mouth two (2) times a day.     HYDROcodone-acetaminophen (Norco) 5-325 mg per tablet Take 1 Tablet by mouth every four to six (4-6) hours as needed for Pain for up to 30 days. Max Daily Amount: 6 Tablets. trimethoprim-sulfamethoxazole (Bactrim DS) 160-800 mg per tablet Take 1 Tablet by mouth two (2) times a day for 10 days. cyclobenzaprine (FLEXERIL) 10 mg tablet TAKE 1 TABLET BY MOUTH THREE TIMES A DAY AS NEEDED (Patient taking differently: Take 10 mg by mouth nightly.)    SUMAtriptan (IMITREX) 50 mg tablet Take 50 mg by mouth as needed for Migraine. fremanezumab-vfrm (Ajovy Autoinjector) 225 mg/1.5 mL auto-injector 225 mg by SubCUTAneous route every month. topiramate (TOPAMAX) 200 mg tablet Take 200 mg by mouth daily. furosemide (LASIX) 20 mg tablet Take 20 mg by mouth as needed. No current facility-administered medications for this visit. Social History     Socioeconomic History    Marital status: SINGLE     Spouse name: Not on file    Number of children: Not on file    Years of education: Not on file    Highest education level: Not on file   Occupational History    Not on file   Tobacco Use    Smoking status: Never    Smokeless tobacco: Never   Vaping Use    Vaping Use: Never used   Substance and Sexual Activity    Alcohol use: No    Drug use: No    Sexual activity: Not on file   Other Topics Concern    Not on file   Social History Narrative    Not on file     Social Determinants of Health     Financial Resource Strain: Not on file   Food Insecurity: Not on file   Transportation Needs: Not on file   Physical Activity: Not on file   Stress: Not on file   Social Connections: Not on file   Intimate Partner Violence: Not on file   Housing Stability: Not on file       Past Surgical History:   Procedure Laterality Date    HX GI      COLONOSCOPY MULTIPLE. HX GI      ENDOSCOPY    HX GYN       X 1    HX HYSTERECTOMY      HX ORTHOPAEDIC      Spinal fusion at L4-L5 X 3    HX OTHER SURGICAL      DIAGNOSTIC EXPLORATORY SURGERIES MULTIPLE.     HX WISDOM TEETH EXTRACTION       X 4    IR INJ FORAMIN EPID LUMB ANES/STER SNGL  12/03/2020    MS UNLISTED NEUROLOGICAL/NEUROMUSCULAR DX PX  2021    LUMBAR FUSION WITH RODS & SCREWS X3    MS UNLISTED NEUROLOGICAL/NEUROMUSCULAR DX PX  11/2022    SPINAL STIMULATOR INSERTED       Family History   Problem Relation Age of Onset    Cancer Mother         COLON CA    Other Neg Hx         UNKNOWN FAMILY HX    Anesth Problems Neg Hx         OB History    No obstetric history on file. REVIEW OF SYSTEMS:    Patient denies any recent fevers, chills, nausea, vomiting, chest pain or shortness of breath. Vitals: There were no vitals taken for this visit. There is no height or weight on file to calculate BMI. PHYSICAL EXAM:    The patient is alert and oriented x3 and in no acute distress. The surgical wound demonstrates erythema extending 0.5 cm laterally from both sides of the wound. The wound itself remains closed and there is no drainage. Sensory testing in all major nerve distributions in the lower extremities is intact and symmetric. Manual motor testing of the major muscle groups of the lower extremities including dorsiflexion /EHL /plantarflexion /eversion, knee flexion/extension, hip flexion/extension/abduction/adduction is intact and symmetric. Negative straight leg raise maneuver bilaterally. There is no calf tenderness. Negative Homans. Distal pulses intact and symmetric. IMAGING:    Results from Appointment encounter on 11/21/22    XR SPINE THORACOLUMB JUNCTION MIN 2 V    Narrative  AP, lateral films of the thoracolumbar spine reveal no evidence of acute fracture or lytic lesion. L3-S1 hardware well-positioned and intact. Spinal stimulator battery located over right buttock and paddle well-positioned and thoracic spine. No orders of the defined types were placed in this encounter. No follow-ups on file. Dr. Bhavana Mcbride was available for immediate consultation as needed. An electronic signature was used to authenticate this note.   -- Angelito Davis PA-C

## 2023-01-24 NOTE — PROGRESS NOTES
1. Have you been to the ER, urgent care clinic since your last visit? Hospitalized since your last visit? No    2. Have you seen or consulted any other health care providers outside of the [address] [place] [address] since your last visit? Include any pap smears or colon screening.  No    Chief Complaint   Patient presents with    Surgical Follow-up     1/17/23 I & D

## 2023-01-31 ENCOUNTER — OFFICE VISIT (OUTPATIENT)
Dept: ORTHOPEDIC SURGERY | Age: 68
End: 2023-01-31
Payer: COMMERCIAL

## 2023-01-31 VITALS — WEIGHT: 97 LBS | HEIGHT: 60 IN | BODY MASS INDEX: 19.04 KG/M2

## 2023-01-31 DIAGNOSIS — L24.A9 WOUND DRAINAGE: Primary | ICD-10-CM

## 2023-01-31 DIAGNOSIS — Z96.89 S/P INSERTION OF SPINAL CORD STIMULATOR: ICD-10-CM

## 2023-01-31 RX ORDER — CIPROFLOXACIN 750 MG/1
750 TABLET, FILM COATED ORAL 2 TIMES DAILY
Qty: 20 TABLET | Refills: 0 | Status: SHIPPED | OUTPATIENT
Start: 2023-01-31

## 2023-01-31 NOTE — PROGRESS NOTES
Monica Marks (: 1955) is a 78 y.o. female, established patient, here for evaluation of the following chief complaint(s):  Surgical Follow-up (Wound check)       ASSESSMENT/PLAN:    Below is the assessment and plan developed based on review of pertinent history, physical exam, labs, studies, and medications. Have discussed the patient's diagnosis, physical exam and radiographic findings at length and have answered all patient questions to her satisfaction. Unfortunately her pain is getting worse. She does have some fluctuance around her thoracic incision and is also started to feel pain around the IPG unit. I think at this point we need to just remove the unit to clear up any potential deep infection. Risk benefits were discussed with her. Procedure: DCS and IPG unit removal      The risks and benefits were discussed at length with the patient and the patient has elected to proceed. Indications for surgery include failed conservative treatment. Alternative treatments, risks and the perioperative course were discussed with the patient. All questions were answered. The risks and benefits of the procedure were explained. Benefits include definitive diagnosis, relief of pain, elimination of deformity and improved function. Risks of surgery including bleeding, infection, weakness, numbness, CSF leak, failure to improve symptoms, exacerbation of medical co-morbidities and even death were discussed with the patient. SUBJECTIVE/OBJECTIVE:    Monica Marks (: 1955) is a 78 y.o. female. Incision And Drainage To Thoracic Spine Wound 2023     The patient states that she feels like her pain is getting worse. Patient states her pain level is severe and burning in nature. Patient localizes pain mainly around the thoracic incisional area but also has now developed pain where the stimulator is in lower lumbar region.   Patient has been taking Cipro 750 mg twice daily as an antibiotic and hydrocodone 3 times daily for pain relief. The patient has been doing daily dressing changes. The patient feels like her body is rejecting the stimulator implant. Patient denies fevers or chills. No Known Allergies    Current Outpatient Medications   Medication Sig    ciprofloxacin HCl (CIPRO) 750 mg tablet Take 1 Tablet by mouth two (2) times a day. HYDROcodone-acetaminophen (Norco) 5-325 mg per tablet Take 1 Tablet by mouth every four to six (4-6) hours as needed for Pain for up to 30 days. Max Daily Amount: 6 Tablets. cyclobenzaprine (FLEXERIL) 10 mg tablet TAKE 1 TABLET BY MOUTH THREE TIMES A DAY AS NEEDED (Patient taking differently: Take 10 mg by mouth nightly.)    SUMAtriptan (IMITREX) 50 mg tablet Take 50 mg by mouth as needed for Migraine. topiramate (TOPAMAX) 200 mg tablet Take 200 mg by mouth daily. furosemide (LASIX) 20 mg tablet Take 20 mg by mouth as needed. No current facility-administered medications for this visit. Social History     Socioeconomic History    Marital status: SINGLE     Spouse name: Not on file    Number of children: Not on file    Years of education: Not on file    Highest education level: Not on file   Occupational History    Not on file   Tobacco Use    Smoking status: Never    Smokeless tobacco: Never   Vaping Use    Vaping Use: Never used   Substance and Sexual Activity    Alcohol use: No    Drug use: No    Sexual activity: Not on file   Other Topics Concern    Not on file   Social History Narrative    Not on file     Social Determinants of Health     Financial Resource Strain: Not on file   Food Insecurity: Not on file   Transportation Needs: Not on file   Physical Activity: Not on file   Stress: Not on file   Social Connections: Not on file   Intimate Partner Violence: Not on file   Housing Stability: Not on file       Past Surgical History:   Procedure Laterality Date    HX GI      COLONOSCOPY MULTIPLE.     HX GI      ENDOSCOPY    HX GYN       X 1    HX HYSTERECTOMY      HX ORTHOPAEDIC      Spinal fusion at L4-L5 X 3    HX OTHER SURGICAL      DIAGNOSTIC EXPLORATORY SURGERIES MULTIPLE. HX WISDOM TEETH EXTRACTION       X 4    IR INJ FORAMIN EPID LUMB ANES/STER SNGL  2020    [place] UNLISTED NEUROLOGICAL/NEUROMUSCULAR DX PX      LUMBAR FUSION WITH RODS & SCREWS X3    [place] UNLISTED NEUROLOGICAL/NEUROMUSCULAR DX PX  2022    SPINAL STIMULATOR INSERTED       Family History   Problem Relation Age of Onset    Cancer Mother         COLON CA    Other Neg Hx         UNKNOWN FAMILY HX    Anesth Problems Neg Hx         OB History    No obstetric history on file. REVIEW OF SYSTEMS:    Patient denies any recent fevers, chills, nausea, vomiting, chest pain or shortness of breath. Vitals:    Ht 5' (1.524 m)   Wt 97 lb (44 kg)   BMI 18.94 kg/m²    Body mass index is 18.94 kg/m². PHYSICAL EXAM:    The patient is alert and oriented x3 and in no acute distress. The surgical wound demonstrates erythema extending 0.5 cm laterally from both sides of the wound. The wound itself remains closed and there is no drainage. Sensory testing in all major nerve distributions in the lower extremities is intact and symmetric. Manual motor testing of the major muscle groups of the lower extremities including dorsiflexion /EHL /plantarflexion /eversion, knee flexion/extension, hip flexion/extension/abduction/adduction is intact and symmetric. Negative straight leg raise maneuver bilaterally. There is no calf tenderness. Negative Homans. Distal pulses intact and symmetric. IMAGING:    Results from Appointment encounter on 22    XR SPINE THORACOLUMB JUNCTION MIN 2 V    Narrative  AP, lateral films of the thoracolumbar spine reveal no evidence of acute fracture or lytic lesion. L3-S1 hardware well-positioned and intact. Spinal stimulator battery located over right buttock and paddle well-positioned and thoracic spine. Orders Placed This Encounter    ciprofloxacin HCl (CIPRO) 750 mg tablet     Sig: Take 1 Tablet by mouth two (2) times a day. Dispense:  20 Tablet     Refill:  0     Please fill this in replacement of the Bactrim sent yesterday. No follow-ups on file. An electronic signature was used to authenticate this note.   -- Minh Coyne MD

## 2023-01-31 NOTE — PROGRESS NOTES
1. Have you been to the ER, urgent care clinic since your last visit? Hospitalized since your last visit? No    2. Have you seen or consulted any other health care providers outside of the [address] [place] [address] since your last visit? Include any pap smears or colon screening.  No    Chief Complaint   Patient presents with    Surgical Follow-up     Wound check

## 2023-01-31 NOTE — PATIENT INSTRUCTIONS
Spondylolysis and Spondylolisthesis: Exercises  Introduction  Here are some examples of exercises for you to try. The exercises may be suggested for a condition or for rehabilitation. Start each exercise slowly. Ease off the exercises if you start to have pain. You will be told when to start these exercises and which ones will work best for you. How to do the exercises  Single knee-to-chest  Lie on your back with your knees bent and your feet flat on the floor. You can put a small pillow under your head and neck if it is more comfortable. Bring one knee to your chest, keeping the other foot flat on the floor. Keep your lower back pressed to the floor. Hold for 15 to 30 seconds. Relax, and lower the knee to the starting position. Repeat with the other leg. Repeat 2 to 4 times with each leg. To get more stretch, put your other leg flat on the floor while pulling your knee to your chest.  Double knee-to-chest  Lie on your back with your knees bent and your feet flat on the floor. You can put a small pillow under your head and neck if it is more comfortable. Bring both knees to your chest.  Keep your lower back pressed to the floor. Hold for 15 to 30 seconds. Relax, and lower your knees to the starting position. Repeat 2 to 4 times. Alternate arm and leg (bird dog) exercise  Do this exercise slowly. Try to keep your body straight at all times. Start on the floor, on your hands and knees. Tighten your belly muscles by pulling your belly button in toward your spine. Be sure you continue to breathe normally and do not hold your breath. Raise one arm off the floor, and hold it straight out in front of you. Be careful not to let your shoulder drop down, because that will twist your trunk. Hold for about 6 seconds, then lower your arm and switch to your other arm. Repeat 8 to 12 times on each arm. When you can do this exercise with ease and no pain, repeat steps 1 through 5.  But this time do it with one leg raised off the floor, holding your leg straight out behind you. Be careful not to let your hip drop down, because that will twist your trunk. When holding your leg straight out becomes easier, try raising your opposite arm at the same time, and repeat steps 1 through 5. Bridging  Lie on your back with both knees bent. Your knees should be bent about 90 degrees. Then push your feet into the floor, squeeze your buttocks, and lift your hips off the floor until your shoulders, hips, and knees are all in a straight line. Hold for about 6 seconds as you continue to breathe normally, and then slowly lower your hips back down to the floor and rest for up to 10 seconds. Repeat 8 to 12 times. Curl-ups  Lie on the floor on your back with your knees bent at a 90-degree angle. Your feet should be flat on the floor, about 12 inches from your buttocks. Cross your arms over your chest. If this bothers your neck, try putting your hands behind your neck (not your head), with your elbows spread apart. Slowly tighten your belly muscles and raise your shoulder blades off the floor. Keep your head in line with your body, and do not press your chin to your chest.  Hold this position for 1 or 2 seconds, then slowly lower yourself back down to the floor. Repeat 8 to 12 times. Plank  Do this exercise slowly. Try to keep your body straight at all times, and do not let one hip drop lower than the other. Lie on your stomach, resting your upper body on your forearms. Tighten your belly muscles by pulling your belly button in toward your spine. Keeping your knees on the floor, press down with your forearms to lift your upper body off the floor. Hold for about 6 seconds, then lower your body to the floor. Rest for up to 10 seconds. Repeat 8 to 12 times. Over time, work up to holding for 15 to 30 seconds each time.   If this exercise is easy to do with your knees on the floor, try doing this exercise with your knees and legs straight, supported by your toes on the floor. Follow-up care is a key part of your treatment and safety. Be sure to make and go to all appointments, and call your doctor if you are having problems. It's also a good idea to know your test results and keep a list of the medicines you take. Current as of: March 9, 2022               Content Version: 13.4  © 2006-2022 [employer], [employer]. Care instructions adapted under license by [employer] (which disclaims liability or warranty for this information). If you have questions about a medical condition or this instruction, always ask your healthcare professional. [place] [address] any warranty or liability for your use of this information.

## 2023-02-01 DIAGNOSIS — L24.A9 WOUND DRAINAGE: Primary | ICD-10-CM

## 2023-02-01 DIAGNOSIS — Z98.1 S/P LUMBAR FUSION: ICD-10-CM

## 2023-02-03 ENCOUNTER — ANESTHESIA EVENT (OUTPATIENT)
Dept: SURGERY | Age: 68
End: 2023-02-03
Payer: COMMERCIAL

## 2023-02-03 DIAGNOSIS — G89.29 CHRONIC BILATERAL LOW BACK PAIN WITH BILATERAL SCIATICA: ICD-10-CM

## 2023-02-03 DIAGNOSIS — M54.42 CHRONIC BILATERAL LOW BACK PAIN WITH BILATERAL SCIATICA: ICD-10-CM

## 2023-02-03 DIAGNOSIS — M54.41 CHRONIC BILATERAL LOW BACK PAIN WITH BILATERAL SCIATICA: ICD-10-CM

## 2023-02-03 DIAGNOSIS — Z96.89 S/P INSERTION OF SPINAL CORD STIMULATOR: ICD-10-CM

## 2023-02-03 DIAGNOSIS — L24.A9 WOUND DRAINAGE: Primary | ICD-10-CM

## 2023-02-03 NOTE — H&P
Office Visit  2023  [facility]  Inna Benavides MD  Orthopedic Surgery Wound drainage +1 more  Dx Surgical Follow-up ; Referred by Renny Mathis MD  Reason for Visit     Progress Notes  Inna Benavides MD (Physician)   Orthopedic Surgery  Abiola Null (: 1955) is a 78 y.o. female, established patient, here for evaluation of the following chief complaint(s):  Surgical Follow-up (Wound check)        ASSESSMENT/PLAN:     Below is the assessment and plan developed based on review of pertinent history, physical exam, labs, studies, and medications. Have discussed the patient's diagnosis, physical exam and radiographic findings at length and have answered all patient questions to her satisfaction. Unfortunately her pain is getting worse. She does have some fluctuance around her thoracic incision which has dehisced. I think at this point we need to remove her stimulator and try to prevent a deeper infection. Risk benefits were discussed with her. Procedure: Removal of dorsal column stimulator        The risks and benefits were discussed at length with the patient and the patient has elected to proceed. Indications for surgery include failed conservative treatment. Alternative treatments, risks and the perioperative course were discussed with the patient. All questions were answered. The risks and benefits of the procedure were explained. Benefits include definitive diagnosis, relief of pain, elimination of deformity and improved function. Risks of surgery including bleeding, infection, weakness, numbness, CSF leak, failure to improve symptoms, exacerbation of medical co-morbidities and even death were discussed with the patient. SUBJECTIVE/OBJECTIVE:       Abiola Null (: 1955) is a 78 y.o. female. The patient contacted the office on 2/3/2023 sating that her wound had opened up. She feels like her pain is getting worse.   Patient states her pain level is  burning in nature. Patient localizes pain mainly around the thoracic incisional area. Patient has been taking  hydrocodone 3 times daily for pain relief. The patient has been doing daily dressing changes. Patient denies fevers or chills. No Known Allergies          Current Outpatient Medications   Medication Sig    ciprofloxacin HCl (CIPRO) 750 mg tablet Take 1 Tablet by mouth two (2) times a day. HYDROcodone-acetaminophen (Norco) 5-325 mg per tablet Take 1 Tablet by mouth every four to six (4-6) hours as needed for Pain for up to 30 days. Max Daily Amount: 6 Tablets. cyclobenzaprine (FLEXERIL) 10 mg tablet TAKE 1 TABLET BY MOUTH THREE TIMES A DAY AS NEEDED (Patient taking differently: Take 10 mg by mouth nightly.)    SUMAtriptan (IMITREX) 50 mg tablet Take 50 mg by mouth as needed for Migraine. topiramate (TOPAMAX) 200 mg tablet Take 200 mg by mouth daily. furosemide (LASIX) 20 mg tablet Take 20 mg by mouth as needed. No current facility-administered medications for this visit.          Social History            Socioeconomic History    Marital status: SINGLE       Spouse name: Not on file    Number of children: Not on file    Years of education: Not on file    Highest education level: Not on file   Occupational History    Not on file   Tobacco Use    Smoking status: Never    Smokeless tobacco: Never   Vaping Use    Vaping Use: Never used   Substance and Sexual Activity    Alcohol use: No    Drug use: No    Sexual activity: Not on file   Other Topics Concern    Not on file   Social History Narrative    Not on file      Social Determinants of Health      Financial Resource Strain: Not on file   Food Insecurity: Not on file   Transportation Needs: Not on file   Physical Activity: Not on file   Stress: Not on file   Social Connections: Not on file   Intimate Partner Violence: Not on file   Housing Stability: Not on file               Past Surgical History:   Procedure Laterality Date    HX GI         COLONOSCOPY MULTIPLE. HX GI         ENDOSCOPY    HX GYN          X 1    HX HYSTERECTOMY        HX ORTHOPAEDIC         Spinal fusion at L4-L5 X 3    HX OTHER SURGICAL         DIAGNOSTIC EXPLORATORY SURGERIES MULTIPLE. HX WISDOM TEETH EXTRACTION          X 4    IR INJ FORAMIN EPID LUMB ANES/STER SNGL   2020    [place] UNLISTED NEUROLOGICAL/NEUROMUSCULAR DX PX        LUMBAR FUSION WITH RODS & SCREWS X3    [place] UNLISTED NEUROLOGICAL/NEUROMUSCULAR DX PX   2022     SPINAL STIMULATOR INSERTED               Family History   Problem Relation Age of Onset    Cancer Mother           COLON CA    Other Neg Hx           UNKNOWN FAMILY HX    Anesth Problems Neg Hx           OB History    No obstetric history on file. REVIEW OF SYSTEMS:     Patient denies any recent fevers, chills, nausea, vomiting, chest pain or shortness of breath. Vitals:     Ht 5' (1.524 m)   Wt 97 lb (44 kg)   BMI 18.94 kg/m²    Body mass index is 18.94 kg/m². PHYSICAL EXAM:     The patient is alert and oriented x3 and in no acute distress. The surgical wound demonstrates erythema extending 0.5 cm laterally from both sides of the wound. The proximal 1/4 of the wound has dehisced and there is mild serosanguinous drainage. Sensory testing in all major nerve distributions in the lower extremities is intact and symmetric. Manual motor testing of the major muscle groups of the lower extremities including dorsiflexion /EHL /plantarflexion /eversion, knee flexion/extension, hip flexion/extension/abduction/adduction is intact and symmetric. Negative straight leg raise maneuver bilaterally. There is no calf tenderness. Negative Homans. Distal pulses intact and symmetric.         IMAGING:     Results from Appointment encounter on 22     XR SPINE THORACOLUMB JUNCTION MIN 2 V     Narrative  AP, lateral films of the thoracolumbar spine reveal no evidence of acute fracture or lytic lesion. L3-S1 hardware well-positioned and intact. Spinal stimulator battery located over right buttock and paddle well-positioned and thoracic spine. Orders Placed This Encounter    ciprofloxacin HCl (CIPRO) 750 mg tablet       Sig: Take 1 Tablet by mouth two (2) times a day. Dispense:  20 Tablet       Refill:  0       Please fill this in replacement of the Bactrim sent yesterday. No follow-ups on file.      An electronic signature was used to authenticate

## 2023-02-06 ENCOUNTER — HOSPITAL ENCOUNTER (OUTPATIENT)
Age: 68
Discharge: HOME | End: 2023-02-06
Attending: ORTHOPAEDIC SURGERY | Admitting: ORTHOPAEDIC SURGERY
Payer: COMMERCIAL

## 2023-02-06 ENCOUNTER — ANESTHESIA (OUTPATIENT)
Dept: SURGERY | Age: 68
End: 2023-02-06
Payer: COMMERCIAL

## 2023-02-06 VITALS
HEIGHT: 60 IN | WEIGHT: 97 LBS | DIASTOLIC BLOOD PRESSURE: 86 MMHG | RESPIRATION RATE: 15 BRPM | TEMPERATURE: 97.2 F | SYSTOLIC BLOOD PRESSURE: 141 MMHG | OXYGEN SATURATION: 97 % | HEART RATE: 69 BPM | BODY MASS INDEX: 19.04 KG/M2

## 2023-02-06 DIAGNOSIS — M54.41 CHRONIC BILATERAL LOW BACK PAIN WITH BILATERAL SCIATICA: ICD-10-CM

## 2023-02-06 DIAGNOSIS — M54.42 CHRONIC BILATERAL LOW BACK PAIN WITH BILATERAL SCIATICA: ICD-10-CM

## 2023-02-06 DIAGNOSIS — Z98.1 S/P LUMBAR FUSION: ICD-10-CM

## 2023-02-06 DIAGNOSIS — T85.192S FAILED SPINAL CORD STIMULATOR, SEQUELA: Primary | ICD-10-CM

## 2023-02-06 DIAGNOSIS — G89.29 CHRONIC BILATERAL LOW BACK PAIN WITH BILATERAL SCIATICA: ICD-10-CM

## 2023-02-06 PROBLEM — Z96.9 PRESENCE OF RETAINED HARDWARE: Status: ACTIVE | Noted: 2023-02-06

## 2023-02-06 LAB
ANION GAP SERPL CALC-SCNC: 4 MMOL/L (ref 5–15)
BUN SERPL-MCNC: 16 MG/DL (ref 6–20)
BUN/CREAT SERPL: 22 (ref 12–20)
CALCIUM SERPL-MCNC: 9.4 MG/DL (ref 8.5–10.1)
CHLORIDE SERPL-SCNC: 114 MMOL/L (ref 97–108)
CO2 SERPL-SCNC: 24 MMOL/L (ref 21–32)
CREAT SERPL-MCNC: 0.73 MG/DL (ref 0.55–1.02)
ERYTHROCYTE [DISTWIDTH] IN BLOOD BY AUTOMATED COUNT: 13.4 % (ref 11.5–14.5)
GLUCOSE SERPL-MCNC: 93 MG/DL (ref 65–100)
HCT VFR BLD AUTO: 34.1 % (ref 35–47)
HGB BLD-MCNC: 10.6 G/DL (ref 11.5–16)
INR PPP: 1 (ref 0.9–1.1)
MCH RBC QN AUTO: 28.4 PG (ref 26–34)
MCHC RBC AUTO-ENTMCNC: 31.1 G/DL (ref 30–36.5)
MCV RBC AUTO: 91.4 FL (ref 80–99)
NRBC # BLD: 0 K/UL (ref 0–0.01)
NRBC BLD-RTO: 0 PER 100 WBC
PLATELET # BLD AUTO: 342 K/UL (ref 150–400)
PMV BLD AUTO: 10.2 FL (ref 8.9–12.9)
POTASSIUM SERPL-SCNC: 3.8 MMOL/L (ref 3.5–5.1)
PROTHROMBIN TIME: 10.9 SEC (ref 9–11.1)
RBC # BLD AUTO: 3.73 M/UL (ref 3.8–5.2)
SODIUM SERPL-SCNC: 142 MMOL/L (ref 136–145)
WBC # BLD AUTO: 4.5 K/UL (ref 3.6–11)

## 2023-02-06 PROCEDURE — 74011000250 HC RX REV CODE- 250: Performed by: ANESTHESIOLOGY

## 2023-02-06 PROCEDURE — 77030040922 HC BLNKT HYPOTHRM STRY -A

## 2023-02-06 PROCEDURE — 85610 PROTHROMBIN TIME: CPT

## 2023-02-06 PROCEDURE — 85027 COMPLETE CBC AUTOMATED: CPT

## 2023-02-06 PROCEDURE — 74011250636 HC RX REV CODE- 250/636: Performed by: ANESTHESIOLOGY

## 2023-02-06 PROCEDURE — 77030042409 HC STOCK ANTIEMB -A

## 2023-02-06 PROCEDURE — 77030029099 HC BN WAX SSPC -A: Performed by: ORTHOPAEDIC SURGERY

## 2023-02-06 PROCEDURE — 74011250636 HC RX REV CODE- 250/636: Performed by: ORTHOPAEDIC SURGERY

## 2023-02-06 PROCEDURE — 76210000016 HC OR PH I REC 1 TO 1.5 HR: Performed by: ORTHOPAEDIC SURGERY

## 2023-02-06 PROCEDURE — 36415 COLL VENOUS BLD VENIPUNCTURE: CPT

## 2023-02-06 PROCEDURE — 77030002916 HC SUT ETHLN J&J -A: Performed by: ORTHOPAEDIC SURGERY

## 2023-02-06 PROCEDURE — 63688 REV/RMV IMP SP NPG/R DTCH CN: CPT | Performed by: ORTHOPAEDIC SURGERY

## 2023-02-06 PROCEDURE — 63662 REMOVE SPINE ELTRD PLATE: CPT | Performed by: ORTHOPAEDIC SURGERY

## 2023-02-06 PROCEDURE — 77030035236 HC SUT PDS STRATFX BARB J&J -B: Performed by: ORTHOPAEDIC SURGERY

## 2023-02-06 PROCEDURE — 74011250636 HC RX REV CODE- 250/636

## 2023-02-06 PROCEDURE — 80048 BASIC METABOLIC PNL TOTAL CA: CPT

## 2023-02-06 PROCEDURE — 63662 REMOVE SPINE ELTRD PLATE: CPT | Performed by: STUDENT IN AN ORGANIZED HEALTH CARE EDUCATION/TRAINING PROGRAM

## 2023-02-06 PROCEDURE — 76060000033 HC ANESTHESIA 1 TO 1.5 HR: Performed by: ORTHOPAEDIC SURGERY

## 2023-02-06 PROCEDURE — 2709999900 HC NON-CHARGEABLE SUPPLY: Performed by: ORTHOPAEDIC SURGERY

## 2023-02-06 PROCEDURE — 76010000149 HC OR TIME 1 TO 1.5 HR: Performed by: ORTHOPAEDIC SURGERY

## 2023-02-06 PROCEDURE — 76210000020 HC REC RM PH II FIRST 0.5 HR: Performed by: ORTHOPAEDIC SURGERY

## 2023-02-06 RX ORDER — MIDAZOLAM HYDROCHLORIDE 1 MG/ML
INJECTION, SOLUTION INTRAMUSCULAR; INTRAVENOUS AS NEEDED
Status: DISCONTINUED | OUTPATIENT
Start: 2023-02-06 | End: 2023-02-06 | Stop reason: HOSPADM

## 2023-02-06 RX ORDER — PHENYLEPHRINE HCL IN 0.9% NACL 0.4MG/10ML
SYRINGE (ML) INTRAVENOUS AS NEEDED
Status: DISCONTINUED | OUTPATIENT
Start: 2023-02-06 | End: 2023-02-06 | Stop reason: HOSPADM

## 2023-02-06 RX ORDER — DIPHENHYDRAMINE HYDROCHLORIDE 50 MG/ML
12.5 INJECTION, SOLUTION INTRAMUSCULAR; INTRAVENOUS
Status: CANCELLED | OUTPATIENT
Start: 2023-02-06 | End: 2023-02-07

## 2023-02-06 RX ORDER — DIPHENHYDRAMINE HYDROCHLORIDE 50 MG/ML
12.5 INJECTION, SOLUTION INTRAMUSCULAR; INTRAVENOUS AS NEEDED
Status: DISCONTINUED | OUTPATIENT
Start: 2023-02-06 | End: 2023-02-06 | Stop reason: HOSPADM

## 2023-02-06 RX ORDER — ONDANSETRON 2 MG/ML
INJECTION INTRAMUSCULAR; INTRAVENOUS AS NEEDED
Status: DISCONTINUED | OUTPATIENT
Start: 2023-02-06 | End: 2023-02-06 | Stop reason: HOSPADM

## 2023-02-06 RX ORDER — VANCOMYCIN HYDROCHLORIDE 1 G/20ML
INJECTION, POWDER, LYOPHILIZED, FOR SOLUTION INTRAVENOUS AS NEEDED
Status: DISCONTINUED | OUTPATIENT
Start: 2023-02-06 | End: 2023-02-06 | Stop reason: HOSPADM

## 2023-02-06 RX ORDER — LIDOCAINE HYDROCHLORIDE 10 MG/ML
0.1 INJECTION, SOLUTION EPIDURAL; INFILTRATION; INTRACAUDAL; PERINEURAL AS NEEDED
Status: DISCONTINUED | OUTPATIENT
Start: 2023-02-06 | End: 2023-02-06 | Stop reason: HOSPADM

## 2023-02-06 RX ORDER — SODIUM CHLORIDE 0.9 % (FLUSH) 0.9 %
5-40 SYRINGE (ML) INJECTION AS NEEDED
Status: DISCONTINUED | OUTPATIENT
Start: 2023-02-06 | End: 2023-02-06 | Stop reason: HOSPADM

## 2023-02-06 RX ORDER — ONDANSETRON 2 MG/ML
4 INJECTION INTRAMUSCULAR; INTRAVENOUS AS NEEDED
Status: DISCONTINUED | OUTPATIENT
Start: 2023-02-06 | End: 2023-02-06 | Stop reason: HOSPADM

## 2023-02-06 RX ORDER — MORPHINE SULFATE 2 MG/ML
2 INJECTION, SOLUTION INTRAMUSCULAR; INTRAVENOUS
Status: DISCONTINUED | OUTPATIENT
Start: 2023-02-06 | End: 2023-02-06 | Stop reason: HOSPADM

## 2023-02-06 RX ORDER — NALOXONE HYDROCHLORIDE 4 MG/.1ML
SPRAY NASAL
Qty: 1 EACH | Refills: 0 | Status: SHIPPED | OUTPATIENT
Start: 2023-02-06

## 2023-02-06 RX ORDER — MIDAZOLAM HYDROCHLORIDE 1 MG/ML
0.5 INJECTION, SOLUTION INTRAMUSCULAR; INTRAVENOUS
Status: DISCONTINUED | OUTPATIENT
Start: 2023-02-06 | End: 2023-02-06 | Stop reason: HOSPADM

## 2023-02-06 RX ORDER — SODIUM CHLORIDE 0.9 % (FLUSH) 0.9 %
5-40 SYRINGE (ML) INJECTION AS NEEDED
Status: CANCELLED | OUTPATIENT
Start: 2023-02-06

## 2023-02-06 RX ORDER — MIDAZOLAM HYDROCHLORIDE 1 MG/ML
1 INJECTION, SOLUTION INTRAMUSCULAR; INTRAVENOUS AS NEEDED
Status: DISCONTINUED | OUTPATIENT
Start: 2023-02-06 | End: 2023-02-06 | Stop reason: HOSPADM

## 2023-02-06 RX ORDER — FENTANYL CITRATE 50 UG/ML
50 INJECTION, SOLUTION INTRAMUSCULAR; INTRAVENOUS AS NEEDED
Status: DISCONTINUED | OUTPATIENT
Start: 2023-02-06 | End: 2023-02-06 | Stop reason: HOSPADM

## 2023-02-06 RX ORDER — SUCCINYLCHOLINE CHLORIDE 20 MG/ML
INJECTION INTRAMUSCULAR; INTRAVENOUS AS NEEDED
Status: DISCONTINUED | OUTPATIENT
Start: 2023-02-06 | End: 2023-02-06 | Stop reason: HOSPADM

## 2023-02-06 RX ORDER — DEXAMETHASONE SODIUM PHOSPHATE 4 MG/ML
INJECTION, SOLUTION INTRA-ARTICULAR; INTRALESIONAL; INTRAMUSCULAR; INTRAVENOUS; SOFT TISSUE AS NEEDED
Status: DISCONTINUED | OUTPATIENT
Start: 2023-02-06 | End: 2023-02-06 | Stop reason: HOSPADM

## 2023-02-06 RX ORDER — SODIUM CHLORIDE 9 MG/ML
25 INJECTION, SOLUTION INTRAVENOUS CONTINUOUS
Status: DISCONTINUED | OUTPATIENT
Start: 2023-02-06 | End: 2023-02-06 | Stop reason: HOSPADM

## 2023-02-06 RX ORDER — SODIUM CHLORIDE 9 MG/ML
125 INJECTION, SOLUTION INTRAVENOUS CONTINUOUS
Status: DISCONTINUED | OUTPATIENT
Start: 2023-02-06 | End: 2023-02-06 | Stop reason: HOSPADM

## 2023-02-06 RX ORDER — MORPHINE SULFATE 2 MG/ML
2 INJECTION, SOLUTION INTRAMUSCULAR; INTRAVENOUS
Status: CANCELLED | OUTPATIENT
Start: 2023-02-06 | End: 2023-02-07

## 2023-02-06 RX ORDER — PROPOFOL 10 MG/ML
INJECTION, EMULSION INTRAVENOUS AS NEEDED
Status: DISCONTINUED | OUTPATIENT
Start: 2023-02-06 | End: 2023-02-06 | Stop reason: HOSPADM

## 2023-02-06 RX ORDER — SODIUM CHLORIDE, SODIUM LACTATE, POTASSIUM CHLORIDE, CALCIUM CHLORIDE 600; 310; 30; 20 MG/100ML; MG/100ML; MG/100ML; MG/100ML
100 INJECTION, SOLUTION INTRAVENOUS CONTINUOUS
Status: DISCONTINUED | OUTPATIENT
Start: 2023-02-06 | End: 2023-02-06 | Stop reason: HOSPADM

## 2023-02-06 RX ORDER — ROCURONIUM BROMIDE 10 MG/ML
INJECTION, SOLUTION INTRAVENOUS AS NEEDED
Status: DISCONTINUED | OUTPATIENT
Start: 2023-02-06 | End: 2023-02-06 | Stop reason: HOSPADM

## 2023-02-06 RX ORDER — ROPIVACAINE HYDROCHLORIDE 5 MG/ML
30 INJECTION, SOLUTION EPIDURAL; INFILTRATION; PERINEURAL AS NEEDED
Status: DISCONTINUED | OUTPATIENT
Start: 2023-02-06 | End: 2023-02-06 | Stop reason: HOSPADM

## 2023-02-06 RX ORDER — FENTANYL CITRATE 50 UG/ML
INJECTION, SOLUTION INTRAMUSCULAR; INTRAVENOUS AS NEEDED
Status: DISCONTINUED | OUTPATIENT
Start: 2023-02-06 | End: 2023-02-06 | Stop reason: HOSPADM

## 2023-02-06 RX ORDER — LIDOCAINE HYDROCHLORIDE 20 MG/ML
INJECTION, SOLUTION EPIDURAL; INFILTRATION; INTRACAUDAL; PERINEURAL AS NEEDED
Status: DISCONTINUED | OUTPATIENT
Start: 2023-02-06 | End: 2023-02-06 | Stop reason: HOSPADM

## 2023-02-06 RX ORDER — SODIUM CHLORIDE, SODIUM LACTATE, POTASSIUM CHLORIDE, CALCIUM CHLORIDE 600; 310; 30; 20 MG/100ML; MG/100ML; MG/100ML; MG/100ML
INJECTION, SOLUTION INTRAVENOUS
Status: DISCONTINUED | OUTPATIENT
Start: 2023-02-06 | End: 2023-02-06 | Stop reason: HOSPADM

## 2023-02-06 RX ORDER — CYCLOBENZAPRINE HCL 10 MG
10 TABLET ORAL
Status: CANCELLED | OUTPATIENT
Start: 2023-02-06

## 2023-02-06 RX ORDER — OXYCODONE HYDROCHLORIDE 5 MG/1
5 TABLET ORAL
Status: CANCELLED | OUTPATIENT
Start: 2023-02-06

## 2023-02-06 RX ORDER — AMOXICILLIN 250 MG
1 CAPSULE ORAL 2 TIMES DAILY
Status: CANCELLED | OUTPATIENT
Start: 2023-02-06

## 2023-02-06 RX ORDER — HYDROMORPHONE HYDROCHLORIDE 1 MG/ML
0.5 INJECTION, SOLUTION INTRAMUSCULAR; INTRAVENOUS; SUBCUTANEOUS
Status: COMPLETED | OUTPATIENT
Start: 2023-02-06 | End: 2023-02-06

## 2023-02-06 RX ORDER — SODIUM CHLORIDE 0.9 % (FLUSH) 0.9 %
5-40 SYRINGE (ML) INJECTION EVERY 8 HOURS
Status: DISCONTINUED | OUTPATIENT
Start: 2023-02-06 | End: 2023-02-06 | Stop reason: HOSPADM

## 2023-02-06 RX ORDER — NALOXONE HYDROCHLORIDE 0.4 MG/ML
0.4 INJECTION, SOLUTION INTRAMUSCULAR; INTRAVENOUS; SUBCUTANEOUS AS NEEDED
Status: CANCELLED | OUTPATIENT
Start: 2023-02-06

## 2023-02-06 RX ORDER — OXYCODONE HYDROCHLORIDE 5 MG/1
10 TABLET ORAL
Status: CANCELLED | OUTPATIENT
Start: 2023-02-06

## 2023-02-06 RX ORDER — TOPIRAMATE 100 MG/1
200 TABLET, FILM COATED ORAL DAILY
Status: CANCELLED | OUTPATIENT
Start: 2023-02-06

## 2023-02-06 RX ORDER — SUMATRIPTAN 25 MG/1
50 TABLET, FILM COATED ORAL AS NEEDED
Status: CANCELLED | OUTPATIENT
Start: 2023-02-06

## 2023-02-06 RX ORDER — ACETAMINOPHEN 500 MG
1000 TABLET ORAL EVERY 6 HOURS
Status: CANCELLED | OUTPATIENT
Start: 2023-02-06

## 2023-02-06 RX ORDER — POLYETHYLENE GLYCOL 3350 17 G/17G
17 POWDER, FOR SOLUTION ORAL DAILY
Status: CANCELLED | OUTPATIENT
Start: 2023-02-06

## 2023-02-06 RX ORDER — CEFAZOLIN SODIUM 1 G/3ML
INJECTION, POWDER, FOR SOLUTION INTRAMUSCULAR; INTRAVENOUS AS NEEDED
Status: DISCONTINUED | OUTPATIENT
Start: 2023-02-06 | End: 2023-02-06 | Stop reason: HOSPADM

## 2023-02-06 RX ORDER — ACETAMINOPHEN 325 MG/1
650 TABLET ORAL ONCE
Status: DISCONTINUED | OUTPATIENT
Start: 2023-02-06 | End: 2023-02-06 | Stop reason: HOSPADM

## 2023-02-06 RX ORDER — CEPHALEXIN 500 MG/1
500 CAPSULE ORAL 4 TIMES DAILY
Qty: 40 CAPSULE | Refills: 0 | Status: SHIPPED | OUTPATIENT
Start: 2023-02-06 | End: 2023-02-16

## 2023-02-06 RX ORDER — SODIUM CHLORIDE 0.9 % (FLUSH) 0.9 %
5-40 SYRINGE (ML) INJECTION EVERY 8 HOURS
Status: CANCELLED | OUTPATIENT
Start: 2023-02-06

## 2023-02-06 RX ORDER — HYDROCODONE BITARTRATE AND ACETAMINOPHEN 5; 325 MG/1; MG/1
1 TABLET ORAL
Qty: 50 TABLET | Refills: 0 | Status: SHIPPED | OUTPATIENT
Start: 2023-02-06 | End: 2023-02-14

## 2023-02-06 RX ORDER — FENTANYL CITRATE 50 UG/ML
25 INJECTION, SOLUTION INTRAMUSCULAR; INTRAVENOUS
Status: COMPLETED | OUTPATIENT
Start: 2023-02-06 | End: 2023-02-06

## 2023-02-06 RX ORDER — FACIAL-BODY WIPES
10 EACH TOPICAL DAILY PRN
Status: CANCELLED | OUTPATIENT
Start: 2023-02-08

## 2023-02-06 RX ADMIN — SODIUM CHLORIDE, POTASSIUM CHLORIDE, SODIUM LACTATE AND CALCIUM CHLORIDE 100 ML/HR: 600; 310; 30; 20 INJECTION, SOLUTION INTRAVENOUS at 06:48

## 2023-02-06 RX ADMIN — HYDROMORPHONE HYDROCHLORIDE 0.5 MG: 1 INJECTION, SOLUTION INTRAMUSCULAR; INTRAVENOUS; SUBCUTANEOUS at 09:00

## 2023-02-06 RX ADMIN — FENTANYL CITRATE 25 MCG: 50 INJECTION, SOLUTION INTRAMUSCULAR; INTRAVENOUS at 09:00

## 2023-02-06 RX ADMIN — FENTANYL CITRATE 25 MCG: 50 INJECTION, SOLUTION INTRAMUSCULAR; INTRAVENOUS at 09:30

## 2023-02-06 RX ADMIN — FENTANYL CITRATE 50 MCG: 50 INJECTION, SOLUTION INTRAMUSCULAR; INTRAVENOUS at 07:32

## 2023-02-06 RX ADMIN — MORPHINE SULFATE 2 MG: 2 INJECTION, SOLUTION INTRAMUSCULAR; INTRAVENOUS at 09:35

## 2023-02-06 RX ADMIN — CEFAZOLIN 2 G: 330 INJECTION, POWDER, FOR SOLUTION INTRAMUSCULAR; INTRAVENOUS at 07:50

## 2023-02-06 RX ADMIN — SUCCINYLCHOLINE CHLORIDE 80 MG: 20 INJECTION, SOLUTION INTRAMUSCULAR; INTRAVENOUS at 07:33

## 2023-02-06 RX ADMIN — HYDROMORPHONE HYDROCHLORIDE 0.5 MG: 1 INJECTION, SOLUTION INTRAMUSCULAR; INTRAVENOUS; SUBCUTANEOUS at 08:48

## 2023-02-06 RX ADMIN — PROPOFOL 100 MG: 10 INJECTION, EMULSION INTRAVENOUS at 07:32

## 2023-02-06 RX ADMIN — FENTANYL CITRATE 50 MCG: 50 INJECTION, SOLUTION INTRAMUSCULAR; INTRAVENOUS at 07:54

## 2023-02-06 RX ADMIN — ROCURONIUM BROMIDE 5 MG: 10 SOLUTION INTRAVENOUS at 07:32

## 2023-02-06 RX ADMIN — Medication 40 MCG: at 08:01

## 2023-02-06 RX ADMIN — LIDOCAINE HYDROCHLORIDE 50 MG: 20 INJECTION, SOLUTION EPIDURAL; INFILTRATION; INTRACAUDAL; PERINEURAL at 07:32

## 2023-02-06 RX ADMIN — ONDANSETRON HYDROCHLORIDE 4 MG: 2 INJECTION, SOLUTION INTRAMUSCULAR; INTRAVENOUS at 07:49

## 2023-02-06 RX ADMIN — MORPHINE SULFATE 2 MG: 2 INJECTION, SOLUTION INTRAMUSCULAR; INTRAVENOUS at 09:27

## 2023-02-06 RX ADMIN — FENTANYL CITRATE 25 MCG: 50 INJECTION, SOLUTION INTRAMUSCULAR; INTRAVENOUS at 08:48

## 2023-02-06 RX ADMIN — PROPOFOL 30 MG: 10 INJECTION, EMULSION INTRAVENOUS at 08:17

## 2023-02-06 RX ADMIN — DEXAMETHASONE SODIUM PHOSPHATE 4 MG: 4 INJECTION, SOLUTION INTRAMUSCULAR; INTRAVENOUS at 07:49

## 2023-02-06 RX ADMIN — SODIUM CHLORIDE, POTASSIUM CHLORIDE, SODIUM LACTATE AND CALCIUM CHLORIDE: 600; 310; 30; 20 INJECTION, SOLUTION INTRAVENOUS at 07:28

## 2023-02-06 RX ADMIN — MIDAZOLAM 2 MG: 1 INJECTION INTRAMUSCULAR; INTRAVENOUS at 07:26

## 2023-02-06 RX ADMIN — FENTANYL CITRATE 25 MCG: 50 INJECTION, SOLUTION INTRAMUSCULAR; INTRAVENOUS at 09:35

## 2023-02-06 NOTE — BRIEF OP NOTE
Brief Postoperative Note    Patient: Mckinley Nathan  YOB: 1955  MRN: [id number]    Date of Procedure: 2/6/2023     Pre-Op Diagnosis: WOUND DRAINAGE    Post-Op Diagnosis: Same as preoperative diagnosis. Procedure(s):  DORSAL COLUMN STIMULATOR REMOVAL    Surgeon(s):  Janelle Kan MD    Surgical Assistant: Physician Assistant: CHERELLE Thao    Anesthesia: General     Estimated Blood Loss (mL): Minimal    Complications: None    Specimens: * No specimens in log *     Implants:   Implant Name Type Inv.  Item Serial No.  Lot No. LRB No. Used Action   LEAD NEUROSTIMULATOR L50CM SURG COVEREDGE - [id number]  LEAD NEUROSTIMULATOR L50CM SURG COVEREDGE [id number] [employer] N/A N/A 1 Explanted   STIMULATOR WAVEWRITER ALPHA IMPL PULSE GENRTR KIT - [id number] [facility] KIT [id number] [employer] NEUROMODUL[employer]_WD [id number] N/A 1 Explanted       Drains: * No LDAs found *    Findings: Wound drainage; IPG unit removal     Electronically Signed by CHERELLE Suggs on 2/6/2023 at 8:27 AM

## 2023-02-06 NOTE — ANESTHESIA PREPROCEDURE EVALUATION
Relevant Problems   No relevant active problems       Anesthetic History   No history of anesthetic complications            Review of Systems / Medical History  Patient summary reviewed, nursing notes reviewed and pertinent labs reviewed    Pulmonary  Within defined limits                 Neuro/Psych   Within defined limits           Cardiovascular  Within defined limits                Exercise tolerance: >4 METS     GI/Hepatic/Renal  Within defined limits              Endo/Other  Within defined limits      Arthritis and cancer     Other Findings              Physical Exam    Airway  Mallampati: II  TM Distance: > 6 cm  Neck ROM: normal range of motion   Mouth opening: Normal     Cardiovascular  Regular rate and rhythm,  S1 and S2 normal,  no murmur, click, rub, or gallop             Dental  No notable dental hx       Pulmonary  Breath sounds clear to auscultation               Abdominal  GI exam deferred       Other Findings            Anesthetic Plan    ASA: 2  Anesthesia type: general          Induction: Intravenous  Anesthetic plan and risks discussed with: Patient

## 2023-02-06 NOTE — DISCHARGE INSTRUCTIONS
[facility] Dr. Manjula Posadas  [phone]    After [address]:  Discharge Instructions Dorsal Spinal Cord Stimulator Removal Surgery     Patient Name: Sophia Lee    Date of procedure: 2/6/2023  Date of discharge: 2/6/2023    Procedure: Procedure(s):  DORSAL COLUMN STIMULATOR REMOVAL  PCP: @PCP@    Follow up appointments  -Follow up with Dr. Manjula Posadas in 2 weeks. Call [phone] to make an appointment as soon as you get home from the hospital.    [address] [place], [place] [address]y: _________________________   phone: ___________________  The agency will contact you to arrange dates/times for visits. Please call them if you do not hear from them within 24 hours after you are discharged  Physical therapy 3 times a week for 3 weeks  Nursing-initial assessment and as needed    When to call your Orthopaedic Surgeon:  -Signs of infection-if your incision is red; continues to have drainage; drainage has a foul odor or if you have a persistent fever over 101 degrees for 24 hours  -Nausea or vomiting, severe headache  -Loss of bowel or bladder function, inability to urinate  -Changes in sensation in your arms or legs (numbness, tingling, loss of color)  -Increased weakness-greater than before your surgery  -Severe pain or pain not relieved by medications  -Signs of a blood clot in your leg-calf pain, tenderness, redness, swelling of lower leg    When to call your Primary Care Physician:  -Concerns about medical conditions such as diabetes, high blood pressure, asthma, congestive heart failure  -Call if blood sugars are elevated, persistent headache or dizziness, coughing or congestion, constipation or diarrhea, burning with urination, abnormal heart rate    When to call 911 and go to the nearest emergency room:  -Acute onset of chest pain, shortness of breath, difficulty breathing    Activity  - You are going home a well person, be as active as possible. Your only exercise should be walking. Start with short frequent walks and increase your walking distance each day.  -Limit the amount of time you sit to 20-30 minute intervals. Sitting for prolonged periods of time will be uncomfortable for you following surgery.  -Do NOT lift anything over 5 pounds  -Do NOT do any straining, twisting or bending  -When you are in bed, you may lay on your back or on either side. Do NOT lie on your stomach    Brace  -If you have a back brace, you should wear your brace at all times when you are out of bed. Do not wear the brace while in bed or showering.  -Remember to always wear a cotton t-shirt underneath your brace.  -Do not bend or twist when your brace is off    Diet  -Resume usual diet; drink plenty of fluids; eat foods high in fiber  -It is important to have regular bowel movements. Pain medications may cause constipation. You may want to take a stool softener (such as Senokot-S or Colace) to prevent constipation.  -If constipation occurs, take a laxative (such as Dulcolax tablets, Milk of Magnesia, or a suppository). Laxatives should only be used if the above preventable measures have failed and you still have not had a bowel movement after three days. Driving  -You may not drive or return to work until instructed by your physician. However, you may ride in the car for short periods of time. Incision Care  Your incision has been closed with absorbable sutures and steri-strips. This will assist with healing. The steri-strips to remain on your incision for 2 weeks. A dry dressing (ABD and tape) will be placed over it and should be changed daily, for at least the first several days after your surgery. If you have no incisional drainage, you may leave the incision open to air if you wish, still leaving the steri-strips in place. Please make sure to wash your hands prior to touching your dressing. You may take brief showers but do not run the water directly onto the wound.  After your shower, blot your incision dry with a soft towel and replace the dry dressing. Do not allow the tape to come in contact with the steri-strips. Do not rub or apply any lotions or ointments to your incision site. Do not soak or scrub your wound. The steri-strips will be removed during your two week follow-up appointment. If you experience drainage leaking from underneath the steri-strips or if it peels off before 2 weeks, please contact your orthopedic surgeons office. Showering  -You may shower in approximately 4 days after your surgery.    -Leave the dressing on during your shower. Do NOT allow the water to run directly onto your dressing. Once you get out of the shower, gently pat the dressing dry. -Reminder- your brace can be removed while showering. Remember to not bend or twist while your brace is off.    -Do not take a tub bath. Preventing blood clots  -You have been given T.E.D. stockings to wear. Continue to wear these for 7 days after your discharge. Put them on in the morning and take them off at night.    -They are used to increase your circulation and prevent blood clots from forming in your legs  -T. E.D. stockings can be machine washed, temperature not to exceed 160° F (71°C) and machine dried for 15 to 20 minutes, temperature not to exceed 250° F (121°C). Pain management  -Take pain medication as prescribed; decrease the amount you use as your pain lessens  -Do not wait until you are in extreme pain to take your medication.  -Avoid alcoholic beverages while taking pain medication    Pain Medication Safety  DO:  -Read the Medication Guide   -Take your medicine exactly as prescribed   -Store your medicine away from children and in a safe place   -Flush unused medicine down the toilet   -Call your healthcare provider for medical advice about side effects. You may report side effects to FDA at 7-000-FDA-5678.   -Please be aware that many medications contain Tylenol.   We do not want you to over medicate so please read the information below as a guide. Do not take more than 4 Grams of Tylenol in a 24 hour period. (There are 1000 milligrams in one Gram)                                                                                                                                                                                                                           Percocet contains 325 mg of Tylenol per tablet (do not take more than 12 tablets in 24 hours)  Lortab contains 500 mg of Tylenol per tablet (do not take more than 8 tablets in 24 hours)  Norco contains 325 mg of Tylenol per tablet (do not take more than 12 tablets in 24 hours). DO NOT:  -Do not give your medicine to others   -Do not take medicine unless it was prescribed for you   -Do not stop taking your medicine without talking to your healthcare provider   -Do not break, chew, crush, dissolve, or inject your medicine. If you cannot  swallow your medicine whole, talk to your healthcare provider.  -Do not drink alcohol while taking this medicine  -Do not take anti-inflammatory medications or aspirin unless instructed by your physician.     ______________________________________________________________________    Anesthesia Discharge Instructions    After general anesthesia or intervenous sedation, for 24 hours or while taking prescription Narcotics:  Limit your activities  Do not drive or operate hazardous machinery  If you have not urinated within 8 hours after discharge, please contact your surgeon on call.   Do not make important personal or business decisions  Do not drink alcoholic beverages    Report the following to your surgeon:  Excessive pain, swelling, redness or odor of or around the surgical area  Temperature over 100.5 degrees  Nausea and vomiting lasting longer than 4 hours or if unable to take medication  Any signs of decreased circulation or nerve impairment to extremity:  Change in color, persistent numbness, tingling, coldness or increased pain.   Any questions

## 2023-02-06 NOTE — OP NOTES
[address]   OPERATIVE REPORT    Name:  Helio Smith  MR#:  [id number]  :  1955  ACCOUNT #:  [de-identified]  DATE OF SERVICE:  2023    PREOPERATIVE DIAGNOSIS:  Failed spinal cord stimulator. POSTOPERATIVE DIAGNOSIS:  Failed spinal cord stimulator. PROCEDURE PERFORMED:  DCS and IPG unit removal.    SURGEON:  Sofia Whitney MD    ASSISTANT:  Saranya Zavala PA-C    ANESTHESIA:  General anesthesia. COMPLICATIONS:  None. SPECIMENS REMOVED:  No specimens. IMPLANTS:  None. ESTIMATED BLOOD LOSS: Minimal.    INDICATIONS:  This is a 67-year-old female with chronic back pain who had spinal cord stimulator placed about 6 weeks ago. She has had continued issues with inflammatory changes and drainage from the superior portion of her thoracic wound. It is suspected that she might have had reaction to the implant itself and so she is for removal.    PROCEDURE:  The patient was identified, brought to the operative suite, underwent general anesthesia without difficulty. Perioperative antibiotics given to the patient. Placed prone on the Gregg frame with all bony prominences well padded. Back was prepped and draped sterilely. Time-out confirmed. We made an incision directly over the DCS paddle lead. Dissection was carried down. The fascia again was not intact, although it had been closed previously and one out of two of the lead wires had come through the subcutaneous and just underneath the wound closure. The leads were empirically dissected and we carefully dissected down to the laminectomy bed and removed the paddle lead from the dorsal column space. Then the IPG pocket was opened on the right hand side and the IPG unit removed. We cut the four leads from the paddle lead at the thoracic wound followed by removal of the battery. All implants were removed and identified. The wounds were irrigated.   We used 1500 mL of pulse irrigation for the thoracic incision and then also the remainder into the IPG unit pocket. Wounds thoroughly irrigated. Vancomycin powder deep in both wounds. #1 Stratafix to close the fascia again in the thoracic wound, 2-0 Stratafix on the subcutaneous layer, and then nylons on the skin. The IPG unit pocket was closed with a 2-0 and a 3-0 Stratafix. Again, the wounds were thoroughly irrigated. Steri-Strips placed. Dry dressings placed. The patient returned to the PACU in stable condition. DANIEL Patel was present for the entirety of the procedure and vital for the performance of the procedure. Levi Sheets pAC assisted with positioning, prepping, draping of the patient before the procedure and instrument manipulation/placement, spinal repositioning during the procedure as well as wound closure, dressing application and brace application after the procedure.  Please note that no intern, resident, or other hospital staff was available to assist during the surgery     Shaniqua Glover MD      [id number]  D:  02/06/2023 8:28  T:  02/06/2023 12:59  JOB #:  [id number]

## 2023-02-06 NOTE — PROGRESS NOTES
02/06/23 0802   Family Communication   Family Update Message Procedure started   Delivery Origin Nurse    Relationship to Patient Friend    Phone Number [place] [phone]   Family/Significant Other Update Called

## 2023-02-06 NOTE — ANESTHESIA POSTPROCEDURE EVALUATION
Procedure(s):  DORSAL COLUMN STIMULATOR REMOVAL. general    Anesthesia Post Evaluation        Patient location during evaluation: PACU  Note status: Adequate. Level of consciousness: responsive to verbal stimuli and sleepy but conscious  Pain management: satisfactory to patient  Airway patency: patent  Anesthetic complications: no  Cardiovascular status: acceptable  Respiratory status: acceptable  Hydration status: acceptable  Comments: +Post-Anesthesia Evaluation and Assessment    Patient: Jennifer Matamoros MRN: [id number]  SSN: [id number]   YOB: 1955  Age: 78 y.o. Sex: female          Cardiovascular Function/Vital Signs    BP (!) 137/91   Pulse 71   Temp 36.2 °C (97.2 °F)   Resp 10   Ht 5' (1.524 m)   Wt 44 kg (97 lb)   SpO2 98%   BMI 18.94 kg/m²     Patient is status post Procedure(s):  DORSAL COLUMN STIMULATOR REMOVAL. Nausea/Vomiting: Controlled. Postoperative hydration reviewed and adequate. Pain:  Pain Scale 1: Numeric (0 - 10) (02/06/23 0900)  Pain Intensity 1: 8 (02/06/23 0900)   Managed. Neurological Status:   Neuro (WDL): Exceptions to WDL (spinal column stimulator) (02/06/23 0622)   At baseline. Mental Status and Level of Consciousness: Arousable. Pulmonary Status:   O2 Device: None (02/06/23 0834)   Adequate oxygenation and airway patent. Complications related to anesthesia: None    Post-anesthesia assessment completed. No concerns. I have evaluated the patient and the patient is stable and ready to be discharged from PACU . Signed By: Yuri Doan MD    2/6/2023        INITIAL Post-op Vital signs:   Vitals Value Taken Time   /89 02/06/23 0930   Temp 36.2 °C (97.2 °F) 02/06/23 0834   Pulse 67 02/06/23 0932   Resp 10 02/06/23 0932   SpO2 97 % 02/06/23 0932   Vitals shown include unvalidated device data.

## 2023-02-07 RX ORDER — CYCLOBENZAPRINE HCL 10 MG
10 TABLET ORAL
Qty: 30 TABLET | Refills: 0 | Status: SHIPPED | OUTPATIENT
Start: 2023-02-07

## 2023-02-08 DIAGNOSIS — L24.A9 WOUND DRAINAGE: Primary | ICD-10-CM

## 2023-02-08 RX ORDER — TRAMADOL HYDROCHLORIDE 50 MG/1
100 TABLET ORAL
Qty: 50 TABLET | Refills: 0 | Status: SHIPPED | OUTPATIENT
Start: 2023-02-08 | End: 2023-02-11

## 2023-02-18 RX ORDER — METHYLPREDNISOLONE 4 MG/1
TABLET ORAL
Qty: 1 DOSE PACK | Refills: 0 | Status: SHIPPED | OUTPATIENT
Start: 2023-02-18

## 2023-02-21 ENCOUNTER — OFFICE VISIT (OUTPATIENT)
Dept: ORTHOPEDIC SURGERY | Age: 68
End: 2023-02-21
Payer: COMMERCIAL

## 2023-02-21 VITALS — BODY MASS INDEX: 19.04 KG/M2 | WEIGHT: 97 LBS | HEIGHT: 60 IN

## 2023-02-21 DIAGNOSIS — L24.A9 WOUND DRAINAGE: ICD-10-CM

## 2023-02-21 DIAGNOSIS — Z96.89 STATUS POST INSERTION OF SPINAL CORD STIMULATOR: Primary | ICD-10-CM

## 2023-02-21 PROCEDURE — 99024 POSTOP FOLLOW-UP VISIT: CPT | Performed by: PHYSICIAN ASSISTANT

## 2023-02-21 RX ORDER — TRAMADOL HYDROCHLORIDE 50 MG/1
TABLET ORAL
COMMUNITY
End: 2023-02-21 | Stop reason: SDUPTHER

## 2023-02-21 RX ORDER — TRAMADOL HYDROCHLORIDE 50 MG/1
50 TABLET ORAL
Qty: 28 TABLET | Refills: 0 | Status: SHIPPED | OUTPATIENT
Start: 2023-02-21 | End: 2023-02-28

## 2023-02-21 NOTE — PROGRESS NOTES
Shasha Flannery (: 1955) is a 78 y.o. female, patient, here for evaluation of the following chief complaint(s): Surgical Follow-up (Sx 23 Dorsal Column Stim Removal (PO#1))       ASSESSMENT/PLAN:    Below is the assessment and plan developed based on review of pertinent history, physical exam, labs, studies, and medications. Have discussed radiographic findings and answered all patient questions to his/her satisfaction. The patient is to continue walking program as tolerated. The patient was asked to follow up in 4 weeks time for reevaluation with Dr Ashley Rea, sooner should he/she develop any surgery related complications. The patient was asked to contact the office with any questions or concerns. The patient understands and agrees to the treatment plan as outlined above. 1. Status post insertion of spinal cord stimulator  -     XR SPINE THORACOLUMB JUNCTION MIN 2 V; Future  2. Wound drainage    No follow-ups on file. SUBJECTIVE/OBJECTIVE:    Dorsal Column Stimulator Removal 2023    Shasha Flannery (: 1955) is a 78 y.o. female who is 2 weeks status post spinal cord stimulator removal.  States she is doing well at this time and is having minimal postoperative discomfort. Patient is currently taking tramadol as needed for pain relief and states that she feels better now that the stimulator has been removed. No flowsheet data found. Imaging:    XR Results (most recent):  Results from Appointment encounter on 23    XR SPINE THORACOLUMB JUNCTION MIN 2 V    Narrative  AP and lateral view radiographs of the thoracic spine obtained in the office today demonstrate complete removal of the spinal stimulator implant and the leads themselves with no evidence of acute bony abnormality.         MRI Results (most recent):  Results from [place] encounter on 22    MRI [facility] SPINE WO CONT    Narrative  EXAM: MRI [facility] SPINE WO CONT    INDICATION: Thoracic back pain with neurogenic claudication. Previous Lumbar  spine decompression surgery. COMPARISON: Thoracolumbar spine views on 11/21/2022    TECHNIQUE: MR imaging of the thoracic spine was performed using the following  sequences: sagittal T1, T2, stir; axial T1, T2. Examination presented for my  interpretation at 1340 hours. CONTRAST: None. FINDINGS:    Right curvature of the cervical spine is partially imaged. Subtle leftward  curvature of the upper thoracic spine. No subluxation. Vertebral body heights  are maintained. Marrow signal is normal. The discs are within normal limits. Chronic Schmorl's nodes on both sides of T11-T12 disc. The course, caliber, and signal intensity of the spinal cord are within normal  limits. Central canal of the spinal cord measures upper normal 2 mm in maximum  diameter at the T7 level. Spinal stimulator terminates at the T6 level. Mild atrophy of the paraspinal musculature. There is no herniation or stenosis. Impression  Within normal limits given the presence of a spinal cord stimulator. No Known Allergies    Current Outpatient Medications   Medication Sig    methylPREDNISolone (MEDROL DOSEPACK) 4 mg tablet Per dose pack instructions    cyclobenzaprine (FLEXERIL) 10 mg tablet Take 1 Tablet by mouth nightly. SUMAtriptan (IMITREX) 50 mg tablet Take 50 mg by mouth as needed for Migraine. topiramate (TOPAMAX) 200 mg tablet Take 200 mg by mouth daily. furosemide (LASIX) 20 mg tablet Take 20 mg by mouth as needed. traMADoL (ULTRAM) 50 mg tablet (Schedule IV Drug) TAKE 1 TABLET BY MOUTH EVERY 6 HOURS AS NEEDED FOR PAIN    naloxone (Narcan) 4 mg/actuation nasal spray Use 1 spray intranasally, then discard. Repeat with new spray every 2 min as needed for opioid overdose symptoms, alternating nostrils. No current facility-administered medications for this visit.        Past Medical History:   Diagnosis Date    Arthritis     Cancer ([facility] [address]     [address]    Chronic pain     BACK    Migraine     LAST HEADACHE 10-30-22    Other ill-defined conditions(799.89)     MIGRAINES        Past Surgical History:   Procedure Laterality Date    HX GI      COLONOSCOPY MULTIPLE. HX GI      ENDOSCOPY    HX GYN       X 1    HX HYSTERECTOMY      HX ORTHOPAEDIC      Spinal fusion at L4-L5 X 3    HX OTHER SURGICAL      DIAGNOSTIC EXPLORATORY SURGERIES MULTIPLE. HX WISDOM TEETH EXTRACTION       X 4    IR INJ FORAMIN EPID LUMB ANES/STER SNGL  2020    [place] UNLISTED NEUROLOGICAL/NEUROMUSCULAR DX PX      LUMBAR FUSION WITH RODS & SCREWS X3    [place] UNLISTED NEUROLOGICAL/NEUROMUSCULAR DX PX  2022    SPINAL STIMULATOR INSERTED       Family History   Problem Relation Age of Onset    Cancer Mother         COLON CA    Other Neg Hx         UNKNOWN FAMILY HX    Anesth Problems Neg Hx         Social History     Tobacco Use    Smoking status: Never    Smokeless tobacco: Never   Vaping Use    Vaping Use: Never used   Substance Use Topics    Alcohol use: No    Drug use: No        Review of Systems   Musculoskeletal:  Positive for back pain. All other systems reviewed and are negative. Vitals:  Ht 5' (1.524 m)   Wt 97 lb (44 kg)   BMI 18.94 kg/m²      Body mass index is 18.94 kg/m². Ortho Exam     The patient is alert and oriented x3 and in no acute distress. The patient ambulates with a normal gait without gait aids. The surgical wound is well-healed without erythema or drainage. Sensory testing in all major nerve distributions in the lower extremities is intact and symmetric. Manual motor testing of the major muscle groups of the lower extremities including dorsiflexion/EHL/ plantar flexion/eversion, knee flexion/extension, hip flexion/extension/abduction/adduction is intact and symmetric in the bilateral lower extremities. Negative straight leg raise maneuver bilaterally. Negative Homans. Distal pulses intact and symmetric.       An electronic signature was used to authenticate this note.   -- Elodia Hollis PA-C

## 2023-02-21 NOTE — PROGRESS NOTES
1. Have you been to the ER, urgent care clinic since your last visit? Hospitalized since your last visit? No    2. Have you seen or consulted any other health care providers outside of the [address] [place] [address] since your last visit? Include any pap smears or colon screening.  No    Chief Complaint   Patient presents with    Surgical Follow-up     Sx 2/06/23 Dorsal Column Stim Removal (PO#1)

## 2023-02-23 RX ORDER — CYCLOBENZAPRINE HCL 10 MG
10 TABLET ORAL
Qty: 30 TABLET | Refills: 0 | Status: SHIPPED | OUTPATIENT
Start: 2023-02-23

## 2023-03-23 RX ORDER — CYCLOBENZAPRINE HCL 10 MG
10 TABLET ORAL
Qty: 30 TABLET | Refills: 0 | Status: SHIPPED | OUTPATIENT
Start: 2023-03-23

## 2023-04-28 RX ORDER — CYCLOBENZAPRINE HCL 10 MG
10 TABLET ORAL
Qty: 30 TABLET | Refills: 0 | Status: SHIPPED | OUTPATIENT
Start: 2023-04-28

## 2023-05-02 ENCOUNTER — OFFICE VISIT (OUTPATIENT)
Dept: ORTHOPEDIC SURGERY | Age: 68
End: 2023-05-02

## 2023-05-02 VITALS — WEIGHT: 97 LBS | HEIGHT: 60 IN | BODY MASS INDEX: 19.04 KG/M2

## 2023-05-02 DIAGNOSIS — M54.42 CHRONIC BILATERAL LOW BACK PAIN WITH BILATERAL SCIATICA: ICD-10-CM

## 2023-05-02 DIAGNOSIS — G89.29 CHRONIC BILATERAL LOW BACK PAIN WITH BILATERAL SCIATICA: ICD-10-CM

## 2023-05-02 DIAGNOSIS — T85.192S FAILURE OF SPINAL CORD STIMULATOR, SEQUELA: ICD-10-CM

## 2023-05-02 DIAGNOSIS — Z98.1 S/P LUMBAR FUSION: Primary | ICD-10-CM

## 2023-05-02 DIAGNOSIS — M54.41 CHRONIC BILATERAL LOW BACK PAIN WITH BILATERAL SCIATICA: ICD-10-CM

## 2023-07-12 ENCOUNTER — HOSPITAL ENCOUNTER (EMERGENCY)
Facility: HOSPITAL | Age: 68
Discharge: HOME | End: 2023-07-12
Attending: STUDENT IN AN ORGANIZED HEALTH CARE EDUCATION/TRAINING PROGRAM
Payer: COMMERCIAL

## 2023-07-12 ENCOUNTER — APPOINTMENT (OUTPATIENT)
Facility: HOSPITAL | Age: 68
End: 2023-07-12
Payer: COMMERCIAL

## 2023-07-12 VITALS
TEMPERATURE: 97.7 F | RESPIRATION RATE: 18 BRPM | DIASTOLIC BLOOD PRESSURE: 109 MMHG | WEIGHT: 97 LBS | OXYGEN SATURATION: 100 % | HEIGHT: 60 IN | HEART RATE: 86 BPM | BODY MASS INDEX: 19.04 KG/M2 | SYSTOLIC BLOOD PRESSURE: 169 MMHG

## 2023-07-12 DIAGNOSIS — T14.8XXA CONTUSION OF SOFT TISSUE: ICD-10-CM

## 2023-07-12 DIAGNOSIS — T14.8XXA ABRASION: ICD-10-CM

## 2023-07-12 DIAGNOSIS — S51.012A LACERATION OF LEFT ELBOW, INITIAL ENCOUNTER: Primary | ICD-10-CM

## 2023-07-12 PROCEDURE — 6370000000 HC RX 637 (ALT 250 FOR IP): Performed by: STUDENT IN AN ORGANIZED HEALTH CARE EDUCATION/TRAINING PROGRAM

## 2023-07-12 PROCEDURE — 72100 X-RAY EXAM L-S SPINE 2/3 VWS: CPT

## 2023-07-12 PROCEDURE — 73070 X-RAY EXAM OF ELBOW: CPT

## 2023-07-12 PROCEDURE — 99283 EMERGENCY DEPT VISIT LOW MDM: CPT

## 2023-07-12 RX ORDER — IBUPROFEN 600 MG/1
600 TABLET ORAL EVERY 8 HOURS PRN
Qty: 20 TABLET | Refills: 0 | Status: SHIPPED | OUTPATIENT
Start: 2023-07-12

## 2023-07-12 RX ORDER — METHOCARBAMOL 750 MG/1
750 TABLET, FILM COATED ORAL 3 TIMES DAILY PRN
Qty: 20 TABLET | Refills: 0 | Status: SHIPPED | OUTPATIENT
Start: 2023-07-12

## 2023-07-12 RX ORDER — IBUPROFEN 600 MG/1
600 TABLET ORAL
Status: COMPLETED | OUTPATIENT
Start: 2023-07-12 | End: 2023-07-12

## 2023-07-12 RX ORDER — METHOCARBAMOL 500 MG/1
1500 TABLET, FILM COATED ORAL ONCE
Status: COMPLETED | OUTPATIENT
Start: 2023-07-12 | End: 2023-07-12

## 2023-07-12 RX ADMIN — Medication 3 ML: at 06:47

## 2023-07-12 RX ADMIN — IBUPROFEN 600 MG: 600 TABLET, FILM COATED ORAL at 06:47

## 2023-07-12 RX ADMIN — METHOCARBAMOL 1500 MG: 500 TABLET ORAL at 06:47

## 2023-07-12 ASSESSMENT — PAIN - FUNCTIONAL ASSESSMENT: PAIN_FUNCTIONAL_ASSESSMENT: 0-10

## 2023-07-12 ASSESSMENT — LIFESTYLE VARIABLES
HOW MANY STANDARD DRINKS CONTAINING ALCOHOL DO YOU HAVE ON A TYPICAL DAY: PATIENT DOES NOT DRINK
HOW OFTEN DO YOU HAVE A DRINK CONTAINING ALCOHOL: NEVER

## 2023-07-12 ASSESSMENT — PAIN SCALES - GENERAL: PAINLEVEL_OUTOF10: 9

## 2023-07-12 NOTE — ED PROVIDER NOTES
Repair    Performed by: Misael Hines MD  Date: 7/12/2023    - Consent obtained from patient prior to the procedure. Indications, risks, and benefits were explained including but not limited to bleeding, infection, scarring, dehiscence, and pain. Questions were sought and answered. An appropriate time out was taken documenting proper procedure, location, and patient. My hands were washed immediately prior to the procedure. I wore sterile gloves throughout the procedure. Laceration repair:  Location: L elbow  Length before repair: 0.5cm  Shape: linear punctate  Layered repair?: no  Heavy contamination?: no  Required debridement?: no  Debris required substantial irrigation?: no  Laceration is hemostatic  Neurovascular exam intact  Anesthesia achieved via LET gel. Amount of anesthetic use:3ml  Wound is irrigated thoroughly with sterile saline  Laceration repaired using horizontal mattress suture x1 with 3-0 ethilon  Number of sutures or staples: 1  Laceration length after repair: same    Sedation required? no  Tetanus status: UTD    Patient tolerated procedure without any complications. Wound is hemostatic and margins are well approximated. No signs of neurovascular compromise on repeat exam post procedure. The patient presents with a laceration(s) as described above that was managed as described above. On exam after the procedure, there was no definitive evidence for associated complications such as tendon, nerve, or arterial compromise. The possibility of residual foreign body was discussed with patient despite irrigation. Instructions were given to return with any increasing discharge, extending erythema, fever, nausea/vomiting, or any other changes. The likelihood of scarring was discussed. I ensured understanding that all lacerations will leave a varying degree of scarring and optimal outcome/cosmetic appearance can never be guaranteed.      Instructions were given to return/call with any perceived weakness

## 2023-07-12 NOTE — DISCHARGE INSTRUCTIONS
Thank you! Thank you for allowing me to care for you in the emergency department. I sincerely hope that you are satisfied with your visit today. It is my goal to provide you with excellent care. Below you will find a list of your labs and imaging from your visit today if applicable. Should you have any questions regarding these results please do not hesitate to call the emergency department. Please review [facility] for a more detailed result list since the below list may not be comprehensive. Instructions on how to sign up to [facility] should be provided in this packet. Labs -  No results found for this or any previous visit (from the past 12 hour(s)). Radiologic Studies -   XR ELBOW LEFT (2 VIEWS)   Final Result   No acute abnormality. XR LUMBAR SPINE (2-3 VIEWS)   Final Result   No acute fracture, subluxation or evidence of orthopedic hardware   complication. If you feel that you have not received excellent quality care or timely care, please ask to speak to the nurse manager. Please choose us in the future for your continued health care needs. ------------------------------------------------------------------------------------------------------------  The exam and treatment you received in the Emergency Department were for an urgent problem and are not intended as complete care. It is important that you follow-up with a doctor, nurse practitioner, or physician assistant to:  (1) confirm your diagnosis,  (2) re-evaluation of changes in your illness and treatment, and  (3) for ongoing care. If your symptoms become worse or you do not improve as expected and you are unable to reach your usual health care provider, you should return to the Emergency Department. We are available 24 hours a day. Please take your discharge instructions with you when you go to your follow-up appointment.      If a prescription has been provided, please have it filled as soon as possible to prevent a

## 2023-07-12 NOTE — ED TRIAGE NOTES
Pt states tripped and fell this morning onto her left elbow hitting it on concrete. States is up to date on tetanus.

## (undated) DEVICE — 4-PORT MANIFOLD: Brand: NEPTUNE 2

## (undated) DEVICE — SUTURE ABSRB L30CM 2-0 VLT SPRL PDS + STRATAFIX SXPP1B410

## (undated) DEVICE — PAD,ABDOMINAL,5"X9",ST,LF,25/BX: Brand: MEDLINE INDUSTRIES, INC.

## (undated) DEVICE — GLOVE ORANGE PI 7 1/2   MSG9075

## (undated) DEVICE — BIPOLAR IRRIGATOR INTEGRATED TUBING AND BIPOLAR CORD SET, DISPOSABLE

## (undated) DEVICE — SMOKE EVACUATION PENCIL: Brand: VALLEYLAB

## (undated) DEVICE — PREP SKN DURAPREP 26ML APPL --

## (undated) DEVICE — Device

## (undated) DEVICE — ELEVATOR NEUROSTIMULATOR SPNL PASS FOR SPNL CRD STIM SYS

## (undated) DEVICE — GOWN,SIRUS,NONRNF,SETINSLV,2XL,18/CS: Brand: MEDLINE

## (undated) DEVICE — SUTURE STRATAFIX SPRL MCRYL + SZ 3 0 L27IN ABSRB UD SH L26MM SXMP1B428

## (undated) DEVICE — DRAPE XR C ARM 41X74IN LF --

## (undated) DEVICE — HANDPIECE SET WITH BONE CLEANING TIP AND SUCTION TUBE: Brand: INTERPULSE

## (undated) DEVICE — SOLUTION IRRIG 3000ML 0.9% SOD CHL USP UROMATIC PLAS CONT

## (undated) DEVICE — GLOVE SURG SZ 8 L12IN FNGR THK79MIL GRN LTX FREE

## (undated) DEVICE — SUTURE ETHLN SZ 2-0 L18IN NONABSORBABLE BLK L26MM FS 3/8 664G

## (undated) DEVICE — BONE WAX WHITE: Brand: BONE WAX WHITE

## (undated) DEVICE — SYR 20ML LL STRL LF --

## (undated) DEVICE — NEEDLE HYPO 22GA L1.5IN BLK POLYPR HUB S STL REG BVL STR

## (undated) DEVICE — SUT PROL 0 30IN CT2 BLU --

## (undated) DEVICE — SOLUTION SURG PREP 26 CC PURPREP

## (undated) DEVICE — REMOTE CONTROL KIT: Brand: FREELINK™

## (undated) DEVICE — LAMINECTOMY RICHMOND-LF: Brand: MEDLINE INDUSTRIES, INC.

## (undated) DEVICE — SOLUTION IV 250ML 0.9% SOD CHL CLR INJ FLX BG CONT PRT CLSR

## (undated) DEVICE — CHARGING SYSTEM KIT: Brand: PRECISION™

## (undated) DEVICE — SUTURE ABSRB BRAID COAT UD OS-6 NO 1 27IN VCRL J535H

## (undated) DEVICE — COVER,MAYO STAND,STERILE: Brand: MEDLINE

## (undated) DEVICE — POSITIONER HD REST FOAM CMFRT TCH

## (undated) DEVICE — 450 ML BOTTLE OF 0.05% CHLORHEXIDINE GLUCONATE IN 99.95% STERILE WATER FOR IRRIGATION, USP AND APPLICATOR.: Brand: IRRISEPT ANTIMICROBIAL WOUND LAVAGE

## (undated) DEVICE — SURGIFOAM SPNG SZ 100

## (undated) DEVICE — SUTURE VCRL 2-0 L27IN ABSRB UD CP-2 L26MM 1/2 CIR REV CUT J869H

## (undated) DEVICE — TOOL 14BA50 LEGEND 14CM 5MM BA: Brand: MIDAS REX ™

## (undated) DEVICE — SUTURE VCRL SZ 3-0 L27IN ABSRB UD CP-2 L26MM 1/2 CIR REV J868H

## (undated) DEVICE — LAMINECTOMY-SMH: Brand: MEDLINE INDUSTRIES, INC.

## (undated) DEVICE — STERILE POLYISOPRENE POWDER-FREE SURGICAL GLOVES WITH EMOLLIENT COATING: Brand: PROTEXIS

## (undated) DEVICE — Device: Brand: PILLOW, GENTLETOUCH, 7 INCH RT

## (undated) DEVICE — ZIP 16 SURGICAL SKIN CLOSURE DEVICE: Brand: ZIP 16 SURGICAL SKIN CLOSURE DEVICE

## (undated) DEVICE — INFECTION CONTROL KIT SYS

## (undated) DEVICE — KIT EVAC 0.13IN RECT TB DIA10FR 400CC PVC 3 SPR Y CONN DRN

## (undated) DEVICE — TOTAL TRAY, 16FR 10ML SIL FOLEY, URN: Brand: MEDLINE

## (undated) DEVICE — GARMENT,MEDLINE,DVT,INT,CALF,MED, GEN2: Brand: MEDLINE

## (undated) DEVICE — MASTISOL ADHESIVE LIQ 2/3ML

## (undated) DEVICE — SOL IRR SOD CL 0.9% 3000ML --

## (undated) DEVICE — COVER,TABLE,HEAVY DUTY,60"X90",STRL: Brand: MEDLINE

## (undated) DEVICE — SPONGE GZ W4XL4IN COT 12 PLY TYP VII WVN C FLD DSGN

## (undated) DEVICE — GLOVE SURG SZ 75 L12IN FNGR THK94MIL STD WHT LTX FREE

## (undated) DEVICE — TOOL TUNNELER STRAW LNG 35CM -- SC-4254

## (undated) DEVICE — INTENT OT USE PROVIDES A STERILE INTERFACE BETWEEN THE OPERATING ROOM SURGICAL LAMPS (NON-STERILE) AND THE SURGEON OR STAFF WORKING IN THE STERILE FIELD.: Brand: ASPEN® ALC PLUS LIGHT HANDLE COVER

## (undated) DEVICE — SUTURE STRATAFIX SYMMETRIC SZ 1 L18IN ABSRB VLT CT1 L36CM SXPP1A404

## (undated) DEVICE — SOL TOP ADH MASTISOL 2/3ML VI -- NDC#00496052348

## (undated) DEVICE — C-ARM: Brand: UNBRANDED

## (undated) DEVICE — FLOSEAL HEMOSTATIC MATRIX, 10ML: Brand: FLOSEAL HEMOSTATIC MATRIX

## (undated) DEVICE — DRAPE CAM W7XL96IN W/ BLU KRATON TIP FOR LSR VID